# Patient Record
Sex: MALE | Race: BLACK OR AFRICAN AMERICAN | NOT HISPANIC OR LATINO
[De-identification: names, ages, dates, MRNs, and addresses within clinical notes are randomized per-mention and may not be internally consistent; named-entity substitution may affect disease eponyms.]

---

## 2018-11-12 ENCOUNTER — LABORATORY RESULT (OUTPATIENT)
Age: 68
End: 2018-11-12

## 2018-11-13 ENCOUNTER — APPOINTMENT (OUTPATIENT)
Dept: OTOLARYNGOLOGY | Facility: CLINIC | Age: 68
End: 2018-11-13
Payer: MEDICARE

## 2018-11-13 VITALS
HEART RATE: 65 BPM | BODY MASS INDEX: 28.05 KG/M2 | WEIGHT: 228 LBS | HEIGHT: 75.5 IN | DIASTOLIC BLOOD PRESSURE: 110 MMHG | SYSTOLIC BLOOD PRESSURE: 146 MMHG

## 2018-11-13 DIAGNOSIS — Z82.49 FAMILY HISTORY OF ISCHEMIC HEART DISEASE AND OTHER DISEASES OF THE CIRCULATORY SYSTEM: ICD-10-CM

## 2018-11-13 DIAGNOSIS — Z86.79 PERSONAL HISTORY OF OTHER DISEASES OF THE CIRCULATORY SYSTEM: ICD-10-CM

## 2018-11-13 DIAGNOSIS — Z83.3 FAMILY HISTORY OF DIABETES MELLITUS: ICD-10-CM

## 2018-11-13 DIAGNOSIS — Z78.9 OTHER SPECIFIED HEALTH STATUS: ICD-10-CM

## 2018-11-13 PROCEDURE — 99205 OFFICE O/P NEW HI 60 MIN: CPT

## 2018-11-13 PROCEDURE — 42800 BIOPSY OF THROAT: CPT

## 2018-11-16 ENCOUNTER — OUTPATIENT (OUTPATIENT)
Dept: OUTPATIENT SERVICES | Facility: HOSPITAL | Age: 68
LOS: 1 days | Discharge: HOME | End: 2018-11-16

## 2018-11-16 DIAGNOSIS — C09.0 MALIGNANT NEOPLASM OF TONSILLAR FOSSA: ICD-10-CM

## 2018-11-20 ENCOUNTER — RESULT REVIEW (OUTPATIENT)
Age: 68
End: 2018-11-20

## 2018-11-21 ENCOUNTER — APPOINTMENT (OUTPATIENT)
Dept: HEMATOLOGY ONCOLOGY | Facility: CLINIC | Age: 68
End: 2018-11-21
Payer: MEDICARE

## 2018-11-21 ENCOUNTER — INPATIENT (INPATIENT)
Facility: HOSPITAL | Age: 68
LOS: 0 days | Discharge: HOME | End: 2018-11-22
Attending: OTOLARYNGOLOGY | Admitting: OTOLARYNGOLOGY

## 2018-11-21 VITALS
DIASTOLIC BLOOD PRESSURE: 77 MMHG | HEIGHT: 72.91 IN | WEIGHT: 216.71 LBS | RESPIRATION RATE: 16 BRPM | TEMPERATURE: 98.3 F | OXYGEN SATURATION: 97 % | BODY MASS INDEX: 28.72 KG/M2 | SYSTOLIC BLOOD PRESSURE: 118 MMHG | HEART RATE: 80 BPM

## 2018-11-21 VITALS
RESPIRATION RATE: 20 BRPM | HEART RATE: 95 BPM | OXYGEN SATURATION: 99 % | SYSTOLIC BLOOD PRESSURE: 125 MMHG | DIASTOLIC BLOOD PRESSURE: 48 MMHG

## 2018-11-21 DIAGNOSIS — C09.9 MALIGNANT NEOPLASM OF TONSIL, UNSPECIFIED: ICD-10-CM

## 2018-11-21 DIAGNOSIS — F17.200 NICOTINE DEPENDENCE, UNSPECIFIED, UNCOMPLICATED: ICD-10-CM

## 2018-11-21 DIAGNOSIS — S52.90XA UNSPECIFIED FRACTURE OF UNSPECIFIED FOREARM, INITIAL ENCOUNTER FOR CLOSED FRACTURE: Chronic | ICD-10-CM

## 2018-11-21 DIAGNOSIS — R07.0 PAIN IN THROAT: ICD-10-CM

## 2018-11-21 LAB
ALBUMIN SERPL ELPH-MCNC: 4.2 G/DL — SIGNIFICANT CHANGE UP (ref 3.3–5)
ALP SERPL-CCNC: 117 U/L — SIGNIFICANT CHANGE UP (ref 40–120)
ALT FLD-CCNC: 9 U/L — SIGNIFICANT CHANGE UP (ref 4–41)
APTT BLD: 29.3 SEC — SIGNIFICANT CHANGE UP (ref 27.5–36.3)
AST SERPL-CCNC: 14 U/L — SIGNIFICANT CHANGE UP (ref 4–40)
BASE EXCESS BLDV CALC-SCNC: 3.6 MMOL/L — SIGNIFICANT CHANGE UP
BASOPHILS # BLD AUTO: 0.04 K/UL — SIGNIFICANT CHANGE UP (ref 0–0.2)
BASOPHILS NFR BLD AUTO: 0.5 % — SIGNIFICANT CHANGE UP (ref 0–2)
BILIRUB SERPL-MCNC: 0.5 MG/DL — SIGNIFICANT CHANGE UP (ref 0.2–1.2)
BLOOD GAS VENOUS - CREATININE: 0.85 MG/DL — SIGNIFICANT CHANGE UP (ref 0.5–1.3)
BUN SERPL-MCNC: 13 MG/DL — SIGNIFICANT CHANGE UP (ref 7–23)
CALCIUM SERPL-MCNC: 9.2 MG/DL — SIGNIFICANT CHANGE UP (ref 8.4–10.5)
CHLORIDE BLDV-SCNC: 102 MMOL/L — SIGNIFICANT CHANGE UP (ref 96–108)
CHLORIDE SERPL-SCNC: 100 MMOL/L — SIGNIFICANT CHANGE UP (ref 98–107)
CO2 SERPL-SCNC: 26 MMOL/L — SIGNIFICANT CHANGE UP (ref 22–31)
CREAT SERPL-MCNC: 0.99 MG/DL — SIGNIFICANT CHANGE UP (ref 0.5–1.3)
EOSINOPHIL # BLD AUTO: 0.17 K/UL — SIGNIFICANT CHANGE UP (ref 0–0.5)
EOSINOPHIL NFR BLD AUTO: 2 % — SIGNIFICANT CHANGE UP (ref 0–6)
GAS PNL BLDV: 137 MMOL/L — SIGNIFICANT CHANGE UP (ref 136–146)
GLUCOSE BLDV-MCNC: 100 — HIGH (ref 70–99)
GLUCOSE SERPL-MCNC: 104 MG/DL — HIGH (ref 70–99)
HCO3 BLDV-SCNC: 26 MMOL/L — SIGNIFICANT CHANGE UP (ref 20–27)
HCT VFR BLD CALC: 42.2 % — SIGNIFICANT CHANGE UP (ref 39–50)
HCT VFR BLDV CALC: 47 % — SIGNIFICANT CHANGE UP (ref 39–51)
HGB BLD-MCNC: 15.3 G/DL — SIGNIFICANT CHANGE UP (ref 13–17)
HGB BLDV-MCNC: 15.3 G/DL — SIGNIFICANT CHANGE UP (ref 13–17)
IMM GRANULOCYTES # BLD AUTO: 0.04 # — SIGNIFICANT CHANGE UP
IMM GRANULOCYTES NFR BLD AUTO: 0.5 % — SIGNIFICANT CHANGE UP (ref 0–1.5)
INR BLD: 1.09 — SIGNIFICANT CHANGE UP (ref 0.88–1.17)
LACTATE BLDV-MCNC: 1.7 MMOL/L — SIGNIFICANT CHANGE UP (ref 0.5–2)
LYMPHOCYTES # BLD AUTO: 2.45 K/UL — SIGNIFICANT CHANGE UP (ref 1–3.3)
LYMPHOCYTES # BLD AUTO: 28.2 % — SIGNIFICANT CHANGE UP (ref 13–44)
MCHC RBC-ENTMCNC: 34.5 PG — HIGH (ref 27–34)
MCHC RBC-ENTMCNC: 36.3 % — HIGH (ref 32–36)
MCV RBC AUTO: 95 FL — SIGNIFICANT CHANGE UP (ref 80–100)
MONOCYTES # BLD AUTO: 0.71 K/UL — SIGNIFICANT CHANGE UP (ref 0–0.9)
MONOCYTES NFR BLD AUTO: 8.2 % — SIGNIFICANT CHANGE UP (ref 2–14)
NEUTROPHILS # BLD AUTO: 5.28 K/UL — SIGNIFICANT CHANGE UP (ref 1.8–7.4)
NEUTROPHILS NFR BLD AUTO: 60.6 % — SIGNIFICANT CHANGE UP (ref 43–77)
NRBC # FLD: 0 — SIGNIFICANT CHANGE UP
PCO2 BLDV: 50 MMHG — SIGNIFICANT CHANGE UP (ref 41–51)
PH BLDV: 7.37 PH — SIGNIFICANT CHANGE UP (ref 7.32–7.43)
PLATELET # BLD AUTO: 211 K/UL — SIGNIFICANT CHANGE UP (ref 150–400)
PMV BLD: 10.4 FL — SIGNIFICANT CHANGE UP (ref 7–13)
PO2 BLDV: 43 MMHG — HIGH (ref 35–40)
POTASSIUM BLDV-SCNC: 4.2 MMOL/L — SIGNIFICANT CHANGE UP (ref 3.4–4.5)
POTASSIUM SERPL-MCNC: 3.5 MMOL/L — SIGNIFICANT CHANGE UP (ref 3.5–5.3)
POTASSIUM SERPL-SCNC: 3.5 MMOL/L — SIGNIFICANT CHANGE UP (ref 3.5–5.3)
PROT SERPL-MCNC: 8.1 G/DL — SIGNIFICANT CHANGE UP (ref 6–8.3)
PROTHROM AB SERPL-ACNC: 12.1 SEC — SIGNIFICANT CHANGE UP (ref 9.8–13.1)
RBC # BLD: 4.44 M/UL — SIGNIFICANT CHANGE UP (ref 4.2–5.8)
RBC # FLD: 12.4 % — SIGNIFICANT CHANGE UP (ref 10.3–14.5)
SAO2 % BLDV: 76.9 % — SIGNIFICANT CHANGE UP (ref 60–85)
SODIUM SERPL-SCNC: 138 MMOL/L — SIGNIFICANT CHANGE UP (ref 135–145)
WBC # BLD: 8.69 K/UL — SIGNIFICANT CHANGE UP (ref 3.8–10.5)
WBC # FLD AUTO: 8.69 K/UL — SIGNIFICANT CHANGE UP (ref 3.8–10.5)

## 2018-11-21 PROCEDURE — 99205 OFFICE O/P NEW HI 60 MIN: CPT | Mod: PD

## 2018-11-21 RX ORDER — ACETAMINOPHEN 500 MG
650 TABLET ORAL EVERY 6 HOURS
Qty: 0 | Refills: 0 | Status: DISCONTINUED | OUTPATIENT
Start: 2018-11-21 | End: 2018-11-22

## 2018-11-21 RX ORDER — HYDROCHLOROTHIAZIDE 25 MG
12.5 TABLET ORAL DAILY
Qty: 0 | Refills: 0 | Status: DISCONTINUED | OUTPATIENT
Start: 2018-11-21 | End: 2018-11-22

## 2018-11-21 RX ORDER — LOSARTAN POTASSIUM 100 MG/1
50 TABLET, FILM COATED ORAL DAILY
Qty: 0 | Refills: 0 | Status: DISCONTINUED | OUTPATIENT
Start: 2018-11-21 | End: 2018-11-22

## 2018-11-21 RX ADMIN — Medication 12.5 MILLIGRAM(S): at 23:45

## 2018-11-21 RX ADMIN — LOSARTAN POTASSIUM 50 MILLIGRAM(S): 100 TABLET, FILM COATED ORAL at 23:45

## 2018-11-21 NOTE — REASON FOR VISIT
[Initial Consultation] : an initial consultation [Family Member] : family member [FreeTextEntry2] : oropharyngeal cancer

## 2018-11-21 NOTE — ED ADULT NURSE NOTE - CHIEF COMPLAINT QUOTE
Pt brought in by EMS from [facility] for difficulty breathing, stridor. Pt noted to have protuberant tongue. 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

## 2018-11-21 NOTE — H&P ADULT - ASSESSMENT
A/P 68M with hx HTN now with likely stage IV tonsil cancer, still needs biopsy to confirm.  -can admit to ENT service   -no ENT intervention needed  -observe overnight on continuous pulse ox  -if no significant desats or breathing difficulty, patient can go home tomorrow morning  -patient needs to follow up outpatient with med onc/rad onc  -discussed with Dr. Billy  -please call/page with any questions or concerns

## 2018-11-21 NOTE — H&P ADULT - NSHPPHYSICALEXAM_GEN_ALL_CORE
NAD, awake and alert  Breathing comfortably on room air  Voice muffled  Face symmetric  Nose clear on right, mild nose bleeding on left  OC/OP: tongue very large and protruding anteriorly, unable to see posterior oropharynx  Neck: firm palpable mass on left neck, palpable cervical LAD on left

## 2018-11-21 NOTE — ED ADULT NURSE NOTE - OBJECTIVE STATEMENT
patient alert ox3 came in c/o swelling to tongue and lips and to mouth. patient is recently diagnosed as tonsillar cancer a week ago. current smoker . stridor noted. states he is able to swallow and requesting to eat food. family by bed side. seen by PA. labs done as ordered. awaiting results .

## 2018-11-21 NOTE — ED PROVIDER NOTE - PHYSICAL EXAMINATION
cyn-  GEN - NAD;  A+O x3   HEAD - NC/AT   EYES- PERRL, EOMI  ENT: mmm, +mass located in left posterior oropharynx, +tongue protrusion, +stridor, able to speak in full sentences, no drooling or pooling. no bleeding.  NECK: Neck supple, non-tender, no palpable masses.  PULMONARY - CTA b/l, symmetric breath sounds.   CARDIAC -s1s2, RRR, no M,G,R  ABDOMEN - +BS, ND, NT, soft, no guarding, no rebound, no masses   BACK - no CVA tenderness, Normal  spine   EXTREMITIES - FROM, symmetric pulses, capillary refill < 2 seconds, no edema   SKIN - no rash or bruising   NEUROLOGIC - alert, speech clear, no focal deficits  PSYCH -nl mood/affect, nl insight.

## 2018-11-21 NOTE — CONSULT LETTER
[Dear  ___] : Dear  [unfilled], [Consult Letter:] : I had the pleasure of evaluating your patient, [unfilled]. [Please see my note below.] : Please see my note below. [Consult Closing:] : Thank you very much for allowing me to participate in the care of this patient.  If you have any questions, please do not hesitate to contact me. [Sincerely,] : Sincerely, [FreeTextEntry2] : Dr. cheng Billy [FreeTextEntry3] : Cam Aldana MD\par \par

## 2018-11-21 NOTE — HISTORY OF PRESENT ILLNESS
[Disease: _____________________] : Disease: [unfilled] [de-identified] : Mr. Stallworth is a pleasant 69 y/o male with a h/o weight loss of the past few months. Over the past few weeks he has had a lack of appetite and dysphagia with voice changes. THis was associated with throat pain and difficulty breathing. Pt was treated with Clindamycin for a throat infection but it did not help. Pt saw Dr. Cervantes who ordered a CT neck that showed a tonsil mass. He saw Dr. Billy last week who did tonsillar bx in the office which was non-diagnostic. He has since had a PET/CT which showed previouslt noted large left sided neck mass extending from the left hilum of glossopharyngeal sulcus and left tonsil which is intensely hypermetabolic. This mass crosses midline and causes mass effect upon airway causing some deviation. It extends inferiorly to subglottic area . There is b/l hypermetabolic LAD with markedly enlarged level 2 nodes on left. There were also findings in the abdomen concerning for metastatic LN. The patient now has progressively protruberant tongue and prolonged periods of "sleep apnea". He c/o severe throat pain and left otalgia. He denies dysphagia, cough, or any other symptoms. \par  [de-identified] : sq cell ca

## 2018-11-21 NOTE — CONSULT NOTE ADULT - SUBJECTIVE AND OBJECTIVE BOX
68M with hx HTN sent to ED from clinic for concern about airway. Patient was recently seen in ENT clinic for left sided tonsil mass seen on CT neck in addition to multiple neck and mediastinal mets. Biopsy was done in clinic, which did not confirm the diagnosis of cancer. However, given high suspicion is currently being scheduled for a biopsy in the OR. Patient was sent for a PET/CT, which was done yesterday, which shows mets to abdomen possibly. Patient was seen in onc clinic today and he appeared very lethargic and desatted to 89% and was sent to ED for airway concern.     In ED patient is currently satting 100%. Says he feels fine, does not think he has any worse breathing than he has had for the last few weeks. He denies any difficulty eating/drinking. He has a muffled voice and some stertor, but no stridor.     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  -no ENT intervention needed  -observe overnight on continuous pulse ox  -if no significant desats or breathing difficulty, patient can go home tomorrow morning  -patient needs to follow up outpatient with med onc/rad onc  -discussed with Dr. Billy  -please call/page with any questions or concerns

## 2018-11-21 NOTE — H&P ADULT - HISTORY OF PRESENT ILLNESS
68M with hx HTN sent to ED from clinic for concern about airway. Patient was recently seen in ENT clinic for left sided tonsil mass seen on CT neck in addition to multiple neck and mediastinal mets. Biopsy was done in clinic, which did not confirm the diagnosis of cancer. However, given high suspicion is currently being scheduled for a biopsy in the OR. Patient was sent for a PET/CT, which was done yesterday, which shows mets to abdomen possibly. Patient was seen in onc clinic today and he appeared very lethargic and desatted to 89% and was sent to ED for airway concern.     In ED patient is currently satting 100%. Says he feels fine, does not think he has any worse breathing than he has had for the last few weeks. He denies any difficulty eating/drinking. He has a muffled voice and some stertor, but no stridor.

## 2018-11-21 NOTE — REVIEW OF SYSTEMS
[Recent Change In Weight] : ~T recent weight change [Hoarseness] : hoarseness [Shortness Of Breath] : shortness of breath [Negative] : Allergic/Immunologic [FreeTextEntry4] : as above

## 2018-11-21 NOTE — ED PROVIDER NOTE - MEDICAL DECISION MAKING DETAILS
69 y/o m with posterior pharyngeal mass, increasing sleepiness, stridor, currently speaking in full sentences, no drooling, ent consulted with concern for airway, also concern for hypercapnea given sleepiness in setting of obstruction, will check labs, ekg, f/u ent recs, careful monitoring, reass.

## 2018-11-21 NOTE — ED ADULT NURSE REASSESSMENT NOTE - NS ED NURSE REASSESS COMMENT FT1
in bed A and Ox  3 in NAD, pt reports " I am very hungry  if I don't eat something I will get very sick" communicates with full and complete sentences, has muffled speech, and grunting breathing denies distress, no distress noted pending ENT eval.

## 2018-11-21 NOTE — PHYSICAL EXAM
[Ambulatory and capable of all self care but unable to carry out any work activities] : Status 2- Ambulatory and capable of all self care but unable to carry out any work activities. Up and about more than 50% of waking hours [Normal] : affect appropriate [de-identified] : markedly protruberant tongue, left sided LAD

## 2018-11-21 NOTE — ED PROVIDER NOTE - OBJECTIVE STATEMENT
cyn-67 y/o m h/o large posterior pharyngeal mass undergoing malignancy w/u presenting with increasing sleepiness, apneic episodes. Patient had recent biopsy of mass, told it was likely CA but results were inconclusive. Patient notes for last few weeks has been very sleepy, falling asleep during regular activities like eating, talking. Has noticed increased stridor as well. No associated fevers, difficulty eating, difficulty breathing, cp, abd pain, swelling. Sent in for ent eval, follows with dr. vital. cyn-69 y/o m h/o HTN w/ large posterior pharyngeal mass undergoing malignancy w/u presenting with increasing sleepiness, apneic episodes. Patient had recent biopsy of oropharyngeal mass, told it was likely tonsillar CA but results were inconclusive. Patient notes for last few weeks has been very sleepy, falling asleep during regular activities like eating, talking. Has noticed increased stridor as well. No associated fevers, difficulty eating, difficulty breathing, cp, abd pain, swelling. Sent in for ent eval, follows with dr. vital.

## 2018-11-21 NOTE — ED PROVIDER NOTE - PROGRESS NOTE DETAILS
JOSE Hobbs: Pt seen by ENT, recommend CDU for obs to eval for desaturation. CDU did not accept pt. Accepted to ENT service, pt cleared to eat by ENT

## 2018-11-21 NOTE — ED ADULT TRIAGE NOTE - CHIEF COMPLAINT QUOTE
Pt brought in by EMS from [facility] for difficulty breathing, stridor. Pt noted to have protuberant tongue. 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

## 2018-11-22 ENCOUNTER — TRANSCRIPTION ENCOUNTER (OUTPATIENT)
Age: 68
End: 2018-11-22

## 2018-11-22 VITALS
TEMPERATURE: 97 F | HEART RATE: 64 BPM | SYSTOLIC BLOOD PRESSURE: 144 MMHG | RESPIRATION RATE: 18 BRPM | OXYGEN SATURATION: 99 % | DIASTOLIC BLOOD PRESSURE: 87 MMHG

## 2018-11-22 RX ORDER — INFLUENZA VIRUS VACCINE 15; 15; 15; 15 UG/.5ML; UG/.5ML; UG/.5ML; UG/.5ML
0.5 SUSPENSION INTRAMUSCULAR ONCE
Qty: 0 | Refills: 0 | Status: DISCONTINUED | OUTPATIENT
Start: 2018-11-22 | End: 2018-11-22

## 2018-11-22 RX ORDER — ACETAMINOPHEN 500 MG
2 TABLET ORAL
Qty: 0 | Refills: 0 | COMMUNITY
Start: 2018-11-22

## 2018-11-22 RX ORDER — LOSARTAN POTASSIUM 100 MG/1
1 TABLET, FILM COATED ORAL
Qty: 0 | Refills: 0 | COMMUNITY
Start: 2018-11-22

## 2018-11-22 RX ADMIN — Medication 650 MILLIGRAM(S): at 08:03

## 2018-11-22 RX ADMIN — Medication 650 MILLIGRAM(S): at 07:33

## 2018-11-22 RX ADMIN — LOSARTAN POTASSIUM 50 MILLIGRAM(S): 100 TABLET, FILM COATED ORAL at 06:18

## 2018-11-22 RX ADMIN — Medication 12.5 MILLIGRAM(S): at 06:18

## 2018-11-22 NOTE — DISCHARGE NOTE ADULT - PLAN OF CARE
workup and treatment for possible tonsil cancer -need to schedule biopsy with Dr. Billy  -follow up with [facility]/radonc

## 2018-11-22 NOTE — DISCHARGE NOTE ADULT - CARE PLAN
Principal Discharge DX:	Tonsillar cancer  Goal:	workup and treatment for possible tonsil cancer  Assessment and plan of treatment:	-need to schedule biopsy with Dr. Billy  [facility]

## 2018-11-22 NOTE — DISCHARGE NOTE ADULT - INSTRUCTIONS
regular diet call md if temperature is above 101.4  call if any difficulty swallowing or breathing .  follow up with  Dr. Billy

## 2018-11-22 NOTE — DISCHARGE NOTE ADULT - MEDICATION SUMMARY - MEDICATIONS TO TAKE
I will START or STAY ON the medications listed below when I get home from the hospital:    acetaminophen 325 mg oral tablet  -- 2 tab(s) by mouth every 6 hours, As needed, Mild Pain (1 - 3)  -- Indication: For pain    losartan 50 mg oral tablet  -- 1 tab(s) by mouth once a day  -- Indication: For HTN (hypertension)    hydroCHLOROthiazide 12.5 mg oral capsule  -- 1 cap(s) by mouth once a day  -- Indication: For HTN (hypertension)

## 2018-11-22 NOTE — DISCHARGE NOTE ADULT - PATIENT PORTAL LINK FT
You can access the [facility] Patient Portal, offered by [facility], by registering with the following website: [email or web address]

## 2018-11-22 NOTE — DISCHARGE NOTE ADULT - CARE PROVIDER_API CALL
Marc Billy), Otolaryngology  [address] [place] [id number]  Phone: [phone]  Fax: [phone]

## 2018-11-22 NOTE — DISCHARGE NOTE ADULT - HOSPITAL COURSE
Pt was admitted for overnight observation on continuous pulse ox. No desats or respiratory difficulty overnight or during admission. Discharged with outpt follow up on HD2

## 2018-11-26 ENCOUNTER — INBOUND DOCUMENT (OUTPATIENT)
Age: 68
End: 2018-11-26

## 2018-11-27 ENCOUNTER — APPOINTMENT (OUTPATIENT)
Dept: RADIATION ONCOLOGY | Facility: CLINIC | Age: 68
End: 2018-11-27
Payer: MEDICARE

## 2018-11-27 ENCOUNTER — OUTPATIENT (OUTPATIENT)
Dept: OUTPATIENT SERVICES | Facility: HOSPITAL | Age: 68
LOS: 1 days | Discharge: HOME | End: 2018-11-27
Payer: MEDICARE

## 2018-11-27 VITALS
HEART RATE: 74 BPM | BODY MASS INDEX: 29.82 KG/M2 | DIASTOLIC BLOOD PRESSURE: 83 MMHG | OXYGEN SATURATION: 95 % | SYSTOLIC BLOOD PRESSURE: 147 MMHG | RESPIRATION RATE: 16 BRPM | HEIGHT: 72 IN | WEIGHT: 220.13 LBS

## 2018-11-27 DIAGNOSIS — S52.90XA UNSPECIFIED FRACTURE OF UNSPECIFIED FOREARM, INITIAL ENCOUNTER FOR CLOSED FRACTURE: Chronic | ICD-10-CM

## 2018-11-27 PROBLEM — I10 ESSENTIAL (PRIMARY) HYPERTENSION: Chronic | Status: ACTIVE | Noted: 2018-11-21

## 2018-11-27 PROCEDURE — 99204 OFFICE O/P NEW MOD 45 MIN: CPT | Mod: 25

## 2018-11-27 PROCEDURE — 77263 THER RADIOLOGY TX PLNG CPLX: CPT

## 2018-11-27 NOTE — VITALS
[Maximal Pain Intensity: 10/10] : 10/10 [Least Pain Intensity: 9/10] : 9/10 [Pain Description/Quality: ___] : Pain description/quality: [unfilled] [Pain Duration: ___] : Pain duration: [unfilled] [Pain Location: ___] : Pain Location: [unfilled] [Pain Interferes with ADLs] : Pain interferes with activities of daily living. [68: Cares for self; unalbe to carry on normal activity or do active work.] : 68: Cares for self; unable to carry on normal activity or do active work. [ECOG Performance Status: 2 - Ambulatory and capable of all self care but unable to carry out any work activities] : Performance Status: 2 - Ambulatory and capable of all self care but unable to carry out any work activities. Up and about more than 50% of waking hours

## 2018-11-29 ENCOUNTER — OUTPATIENT (OUTPATIENT)
Dept: OUTPATIENT SERVICES | Facility: HOSPITAL | Age: 68
LOS: 1 days | End: 2018-11-29
Payer: MEDICARE

## 2018-11-29 VITALS
TEMPERATURE: 98 F | HEIGHT: 72.25 IN | OXYGEN SATURATION: 98 % | HEART RATE: 81 BPM | WEIGHT: 214.95 LBS | SYSTOLIC BLOOD PRESSURE: 130 MMHG | RESPIRATION RATE: 14 BRPM | DIASTOLIC BLOOD PRESSURE: 78 MMHG

## 2018-11-29 DIAGNOSIS — R22.0 LOCALIZED SWELLING, MASS AND LUMP, HEAD: ICD-10-CM

## 2018-11-29 DIAGNOSIS — Z98.890 OTHER SPECIFIED POSTPROCEDURAL STATES: Chronic | ICD-10-CM

## 2018-11-29 DIAGNOSIS — S52.90XA UNSPECIFIED FRACTURE OF UNSPECIFIED FOREARM, INITIAL ENCOUNTER FOR CLOSED FRACTURE: Chronic | ICD-10-CM

## 2018-11-29 DIAGNOSIS — G47.30 SLEEP APNEA, UNSPECIFIED: ICD-10-CM

## 2018-11-29 LAB
ALBUMIN SERPL ELPH-MCNC: 4.1 G/DL — SIGNIFICANT CHANGE UP (ref 3.3–5)
ALP SERPL-CCNC: 114 U/L — SIGNIFICANT CHANGE UP (ref 40–120)
ALT FLD-CCNC: 8 U/L — SIGNIFICANT CHANGE UP (ref 4–41)
AST SERPL-CCNC: 11 U/L — SIGNIFICANT CHANGE UP (ref 4–40)
BILIRUB SERPL-MCNC: 0.4 MG/DL — SIGNIFICANT CHANGE UP (ref 0.2–1.2)
BUN SERPL-MCNC: 18 MG/DL — SIGNIFICANT CHANGE UP (ref 7–23)
CALCIUM SERPL-MCNC: 9.2 MG/DL — SIGNIFICANT CHANGE UP (ref 8.4–10.5)
CHLORIDE SERPL-SCNC: 100 MMOL/L — SIGNIFICANT CHANGE UP (ref 98–107)
CO2 SERPL-SCNC: 28 MMOL/L — SIGNIFICANT CHANGE UP (ref 22–31)
CREAT SERPL-MCNC: 1.16 MG/DL — SIGNIFICANT CHANGE UP (ref 0.5–1.3)
GLUCOSE SERPL-MCNC: 133 MG/DL — HIGH (ref 70–99)
HBA1C BLD-MCNC: 5.9 % — HIGH (ref 4–5.6)
HCT VFR BLD CALC: 41.3 % — SIGNIFICANT CHANGE UP (ref 39–50)
HGB BLD-MCNC: 14.6 G/DL — SIGNIFICANT CHANGE UP (ref 13–17)
MCHC RBC-ENTMCNC: 34 PG — SIGNIFICANT CHANGE UP (ref 27–34)
MCHC RBC-ENTMCNC: 35.4 % — SIGNIFICANT CHANGE UP (ref 32–36)
MCV RBC AUTO: 96 FL — SIGNIFICANT CHANGE UP (ref 80–100)
NRBC # FLD: 0 — SIGNIFICANT CHANGE UP
PLATELET # BLD AUTO: 186 K/UL — SIGNIFICANT CHANGE UP (ref 150–400)
PMV BLD: 11.6 FL — SIGNIFICANT CHANGE UP (ref 7–13)
POTASSIUM SERPL-MCNC: 3 MMOL/L — LOW (ref 3.5–5.3)
POTASSIUM SERPL-SCNC: 3 MMOL/L — LOW (ref 3.5–5.3)
PROT SERPL-MCNC: 7.9 G/DL — SIGNIFICANT CHANGE UP (ref 6–8.3)
RBC # BLD: 4.3 M/UL — SIGNIFICANT CHANGE UP (ref 4.2–5.8)
RBC # FLD: 12.3 % — SIGNIFICANT CHANGE UP (ref 10.3–14.5)
SODIUM SERPL-SCNC: 142 MMOL/L — SIGNIFICANT CHANGE UP (ref 135–145)
WBC # BLD: 6.78 K/UL — SIGNIFICANT CHANGE UP (ref 3.8–10.5)
WBC # FLD AUTO: 6.78 K/UL — SIGNIFICANT CHANGE UP (ref 3.8–10.5)

## 2018-11-29 PROCEDURE — 93010 ELECTROCARDIOGRAM REPORT: CPT

## 2018-11-29 NOTE — H&P PST ADULT - HISTORY OF PRESENT ILLNESS
68M with hx HTN and recently seen in "ENT clinic for left sided tonsil mass seen on CT neck in addition to multiple neck and mediastinal mets". Patient and cousin report that Patient has had recent weight loss, voice changes, throat swelling and throat pain. Patient is s/p CT Neck, PET scan MRI with abnormal results. Patient denies dysphagia and recently diagnosed with Sleep Apnea not on any interventions. Patient was referred to Dr. Villela for an evaluation. Pre op diagnosis: localized swelling, mass and lump, head. Patient is scheduled for direct laryngoscopy with biopsy and esophagoscopy scheduled on 12/5/2018. 68M with hx HTN and recently seen in "ENT clinic for left sided tonsil mass seen on CT neck in addition to multiple neck and mediastinal mets". Patient and cousin report that Patient has had recent weight loss, voice changes, throat swelling and throat pain. Patient is s/p CT Neck, PET scan MRI with abnormal results. Patient denies dysphagia and recently diagnosed with Sleep Apnea not on any interventions. Patient was referred to Dr. Villela for an evaluation. Pre op diagnosis: localized swelling, mass and lump, head. Patient is scheduled for direct laryngoscopy with biopsy and esophagoscopy scheduled on 12/5/2018.       Patient is a poor historian.

## 2018-11-29 NOTE — H&P PST ADULT - PROBLEM SELECTOR PLAN 1
Patient is scheduled for direct laryngoscopy with biopsy and esophagoscopy scheduled on 12/5/2018.     Preop instructions, pepcid,  provided. Pt and family stated understanding.    Pending medical evaluation per surgeon and PST ( Patient is a poor historian)Dr. Pittman -[phone]-[phone].     Patient instructed to take antihypertensive medications per routine. Patient is scheduled for direct laryngoscopy with biopsy and esophagoscopy scheduled on 12/5/2018.     Preop instructions, pepcid,  provided. Pt and family stated understanding.    Pending medical evaluation per surgeon and PST ( Patient is a poor historian) and Pending comparison EKG -Dr. Pittman -[phone]-[phone].     Patient instructed to take antihypertensive medications per routine.      Asprin Plan - Last dose 11/28/2018    Discussed case with Dr. Mendiola, In agreement with plan.

## 2018-11-29 NOTE — H&P PST ADULT - ENMT COMMENTS
left ear pain, Throat swelling, left side of the neck swelling, voice change Pre op diagnosis: Localized swelling, mass and lump, head

## 2018-11-29 NOTE — H&P PST ADULT - NEGATIVE MUSCULOSKELETAL SYMPTOMS
no leg pain R/no arthritis/no muscle cramps/no muscle weakness/no neck pain/no arm pain L/no arm pain R/no back pain/no leg pain L

## 2018-11-29 NOTE — H&P PST ADULT - EKG AND INTERPRETATION
EKG in chart EKG in chart- Reviewed EKG with Dr. Mendiola - Pending Comparison EKG, Patient denies chest pain, palpitations, SOB.

## 2018-11-29 NOTE — H&P PST ADULT - NEGATIVE ENMT SYMPTOMS
no recurrent cold sores/no tinnitus/no nasal discharge/no gum bleeding/no hearing difficulty/no abnormal taste sensation/no vertigo/no sinus symptoms

## 2018-11-29 NOTE — H&P PST ADULT - NEUROLOGICAL DETAILS
responds to verbal commands/responds to pain/normal strength/alert and oriented x 3/sensation intact

## 2018-11-29 NOTE — H&P PST ADULT - ASSESSMENT
Pre op diagnosis: localized swelling, mass and lump, head. Patient is scheduled for direct laryngoscopy with biopsy and esophagoscopy scheduled on 12/5/2018.

## 2018-11-29 NOTE — H&P PST ADULT - NEGATIVE OPHTHALMOLOGIC SYMPTOMS
no irritation L/no discharge R/no pain L/no irritation R/no photophobia/no blurred vision R/no diplopia/no blurred vision L/no discharge L/no pain R

## 2018-11-29 NOTE — H&P PST ADULT - PAIN, CHRONIC: FACTORS THAT AGGRAVATE, PROFILE
19    Patient: Ken Kerr  : 1974 Visit date: 2019    Dear  Dr. Gayla Cisse MD,    Thank you for referring Ken Kerr to my practice. Please find my assessment and plan below.       Imp: Apparent healed scrotal abscess no evidence of re
19    Patient: Sara Ontiveros  : 1974 Visit date: 2019    Dear  Dr. Valerie Hidalgo MD,    Thank you for referring Sara Ontiveros to my practice. Please find my assessment and plan below.       Imp: Apparent healed scrotal abscess no evidence
movement

## 2018-11-29 NOTE — H&P PST ADULT - PMH
Asthma  Denies recent hospitalizations for asthma  DM (diabetes mellitus)  Not on any medications  HTN (hypertension)    Localized swelling, mass and lump, head    Tonsil cancer Asthma  Denies recent hospitalizations for asthma  DM (diabetes mellitus)  Not on any medications  HTN (hypertension)    Localized swelling, mass and lump, head    Sleep apnea    Tonsil cancer

## 2018-11-29 NOTE — H&P PST ADULT - PSH
Forearm fracture    History of surgery  Right knee surgery Forearm fracture    History of surgery  Right knee replacement - surgery

## 2018-11-29 NOTE — H&P PST ADULT - NEGATIVE NEUROLOGICAL SYMPTOMS
no loss of consciousness/no transient paralysis/no focal seizures/no loss of sensation/no difficulty walking/no vertigo/no syncope/no weakness/no paresthesias/no generalized seizures/no facial palsy/no hemiparesis/no tremors/no headache/no confusion

## 2018-11-29 NOTE — H&P PST ADULT - CONSTITUTIONAL COMMENTS
Patient frequently dosing off through out the PST evaluation, and easily arousable However, Alert and Oriented x 3 when awake

## 2018-11-29 NOTE — H&P PST ADULT - NEGATIVE GENERAL GENITOURINARY SYMPTOMS
no bladder infections/no dysuria/no urinary hesitancy/no flank pain R/no incontinence/no nocturia/no hematuria/normal urinary frequency/no flank pain L/no urine discoloration

## 2018-11-30 ENCOUNTER — OTHER (OUTPATIENT)
Age: 68
End: 2018-11-30

## 2018-11-30 ENCOUNTER — EMERGENCY (EMERGENCY)
Facility: HOSPITAL | Age: 68
LOS: 1 days | Discharge: HOME | End: 2018-11-30
Attending: EMERGENCY MEDICINE | Admitting: EMERGENCY MEDICINE
Payer: MEDICARE

## 2018-11-30 VITALS
HEART RATE: 70 BPM | SYSTOLIC BLOOD PRESSURE: 184 MMHG | OXYGEN SATURATION: 96 % | DIASTOLIC BLOOD PRESSURE: 92 MMHG | RESPIRATION RATE: 18 BRPM

## 2018-11-30 VITALS
OXYGEN SATURATION: 97 % | SYSTOLIC BLOOD PRESSURE: 178 MMHG | RESPIRATION RATE: 14 BRPM | HEART RATE: 96 BPM | DIASTOLIC BLOOD PRESSURE: 97 MMHG

## 2018-11-30 VITALS — HEART RATE: 88 BPM | OXYGEN SATURATION: 95 %

## 2018-11-30 DIAGNOSIS — S52.90XA UNSPECIFIED FRACTURE OF UNSPECIFIED FOREARM, INITIAL ENCOUNTER FOR CLOSED FRACTURE: Chronic | ICD-10-CM

## 2018-11-30 DIAGNOSIS — Z98.890 OTHER SPECIFIED POSTPROCEDURAL STATES: Chronic | ICD-10-CM

## 2018-11-30 PROBLEM — E11.9 TYPE 2 DIABETES MELLITUS WITHOUT COMPLICATIONS: Chronic | Status: ACTIVE | Noted: 2018-11-21

## 2018-11-30 LAB
ALBUMIN SERPL ELPH-MCNC: 4.2 G/DL — SIGNIFICANT CHANGE UP (ref 3.3–5)
ALP SERPL-CCNC: 118 U/L — SIGNIFICANT CHANGE UP (ref 40–120)
ALT FLD-CCNC: 8 U/L — SIGNIFICANT CHANGE UP (ref 4–41)
AST SERPL-CCNC: 14 U/L — SIGNIFICANT CHANGE UP (ref 4–40)
BASE EXCESS BLDV CALC-SCNC: 6.8 MMOL/L — SIGNIFICANT CHANGE UP
BASOPHILS # BLD AUTO: 0.03 K/UL — SIGNIFICANT CHANGE UP (ref 0–0.2)
BASOPHILS NFR BLD AUTO: 0.4 % — SIGNIFICANT CHANGE UP (ref 0–2)
BILIRUB SERPL-MCNC: 0.7 MG/DL — SIGNIFICANT CHANGE UP (ref 0.2–1.2)
BLOOD GAS VENOUS - CREATININE: 0.95 MG/DL — SIGNIFICANT CHANGE UP (ref 0.5–1.3)
BUN SERPL-MCNC: 17 MG/DL — SIGNIFICANT CHANGE UP (ref 7–23)
CALCIUM SERPL-MCNC: 9.6 MG/DL — SIGNIFICANT CHANGE UP (ref 8.4–10.5)
CHLORIDE BLDV-SCNC: 101 MMOL/L — SIGNIFICANT CHANGE UP (ref 96–108)
CHLORIDE SERPL-SCNC: 98 MMOL/L — SIGNIFICANT CHANGE UP (ref 98–107)
CO2 SERPL-SCNC: 25 MMOL/L — SIGNIFICANT CHANGE UP (ref 22–31)
CREAT SERPL-MCNC: 0.97 MG/DL — SIGNIFICANT CHANGE UP (ref 0.5–1.3)
EOSINOPHIL # BLD AUTO: 0.13 K/UL — SIGNIFICANT CHANGE UP (ref 0–0.5)
EOSINOPHIL NFR BLD AUTO: 1.9 % — SIGNIFICANT CHANGE UP (ref 0–6)
GAS PNL BLDV: 141 MMOL/L — SIGNIFICANT CHANGE UP (ref 136–146)
GLUCOSE BLDV-MCNC: 129 — HIGH (ref 70–99)
GLUCOSE SERPL-MCNC: 122 MG/DL — HIGH (ref 70–99)
HCO3 BLDV-SCNC: 28 MMOL/L — HIGH (ref 20–27)
HCT VFR BLD CALC: 45.1 % — SIGNIFICANT CHANGE UP (ref 39–50)
HCT VFR BLDV CALC: 49.9 % — SIGNIFICANT CHANGE UP (ref 39–51)
HGB BLD-MCNC: 16.2 G/DL — SIGNIFICANT CHANGE UP (ref 13–17)
HGB BLDV-MCNC: 16.3 G/DL — SIGNIFICANT CHANGE UP (ref 13–17)
IMM GRANULOCYTES # BLD AUTO: 0.03 # — SIGNIFICANT CHANGE UP
IMM GRANULOCYTES NFR BLD AUTO: 0.4 % — SIGNIFICANT CHANGE UP (ref 0–1.5)
LACTATE BLDV-MCNC: 1.4 MMOL/L — SIGNIFICANT CHANGE UP (ref 0.5–2)
LYMPHOCYTES # BLD AUTO: 1.73 K/UL — SIGNIFICANT CHANGE UP (ref 1–3.3)
LYMPHOCYTES # BLD AUTO: 25.7 % — SIGNIFICANT CHANGE UP (ref 13–44)
MCHC RBC-ENTMCNC: 33.9 PG — SIGNIFICANT CHANGE UP (ref 27–34)
MCHC RBC-ENTMCNC: 35.9 % — SIGNIFICANT CHANGE UP (ref 32–36)
MCV RBC AUTO: 94.4 FL — SIGNIFICANT CHANGE UP (ref 80–100)
MONOCYTES # BLD AUTO: 0.67 K/UL — SIGNIFICANT CHANGE UP (ref 0–0.9)
MONOCYTES NFR BLD AUTO: 9.9 % — SIGNIFICANT CHANGE UP (ref 2–14)
NEUTROPHILS # BLD AUTO: 4.15 K/UL — SIGNIFICANT CHANGE UP (ref 1.8–7.4)
NEUTROPHILS NFR BLD AUTO: 61.7 % — SIGNIFICANT CHANGE UP (ref 43–77)
NRBC # FLD: 0 — SIGNIFICANT CHANGE UP
PCO2 BLDV: 54 MMHG — HIGH (ref 41–51)
PH BLDV: 7.39 PH — SIGNIFICANT CHANGE UP (ref 7.32–7.43)
PLATELET # BLD AUTO: 200 K/UL — SIGNIFICANT CHANGE UP (ref 150–400)
PMV BLD: 10.8 FL — SIGNIFICANT CHANGE UP (ref 7–13)
PO2 BLDV: 38 MMHG — SIGNIFICANT CHANGE UP (ref 35–40)
POTASSIUM BLDV-SCNC: 3.1 MMOL/L — LOW (ref 3.4–4.5)
POTASSIUM SERPL-MCNC: 3.2 MMOL/L — LOW (ref 3.5–5.3)
POTASSIUM SERPL-SCNC: 3.2 MMOL/L — LOW (ref 3.5–5.3)
PROT SERPL-MCNC: 8.2 G/DL — SIGNIFICANT CHANGE UP (ref 6–8.3)
RBC # BLD: 4.78 M/UL — SIGNIFICANT CHANGE UP (ref 4.2–5.8)
RBC # FLD: 12.2 % — SIGNIFICANT CHANGE UP (ref 10.3–14.5)
SAO2 % BLDV: 68 % — SIGNIFICANT CHANGE UP (ref 60–85)
SODIUM SERPL-SCNC: 138 MMOL/L — SIGNIFICANT CHANGE UP (ref 135–145)
WBC # BLD: 6.74 K/UL — SIGNIFICANT CHANGE UP (ref 3.8–10.5)
WBC # FLD AUTO: 6.74 K/UL — SIGNIFICANT CHANGE UP (ref 3.8–10.5)

## 2018-11-30 PROCEDURE — 99285 EMERGENCY DEPT VISIT HI MDM: CPT

## 2018-11-30 PROCEDURE — 71045 X-RAY EXAM CHEST 1 VIEW: CPT | Mod: 26

## 2018-11-30 RX ORDER — POTASSIUM CHLORIDE 20 MEQ
10 PACKET (EA) ORAL ONCE
Qty: 0 | Refills: 0 | Status: DISCONTINUED | OUTPATIENT
Start: 2018-11-30 | End: 2018-11-30

## 2018-11-30 NOTE — CONSULT NOTE ADULT - SUBJECTIVE AND OBJECTIVE BOX
HPI  68M with hx HTN sent to ED from clinic for concern about airway. Patient was recently seen in ENT clinic for left sided tonsil mass seen on CT neck in addition to multiple neck and mediastinal mets. Biopsy was done in clinic, which did not confirm the diagnosis of cancer. However, given high suspicion is currently being scheduled for a biopsy in the OR 12/5. PET/CT shows mets to abdomen possibly. Patient was seen in rad onc clinic today and he appeared very lethargic SOB and was sent to ED for airway concern. pt states he worked a job on a  last pm (he is a ) and exerted himself. currently reports that his breathing is at baseline.     In ED patient is currently satting 100%. denies any difficulty eating/drinking. voice remains as muffled as it has been. placed on bipap in ed. bipap was removed at bedside during exam. of note, no desats.     Exam  NAD, awake and alert  Breathing comfortably on room air  Voice muffled  Face symmetric  NC clear  OC/OP: tongue very large and protruding anteriorly, left tonsil mass seen with rightward deviation of the uvula, no bleeding  Neck: left firm palpable cervical LAD     Labs reviewed    A/P 68M with hx HTN now with likely stage IV tonsil cancer, stable resp status  -no acute ENT intervention needed  -OR bx 12/5  -po as wilfredo  -refrain from heavy physical activity  -outpt med onc and rad onc following  -discussed with Dr. Billy HPI  68M with hx HTN sent to ED from clinic for concern about airway. Patient was recently seen in ENT clinic for left sided tonsil mass seen on CT neck in addition to multiple neck and mediastinal mets. Biopsy was done in clinic, which did not confirm the diagnosis of cancer. However, given high suspicion is currently being scheduled for a biopsy in the OR 12/5. PET/CT shows mets to abdomen possibly. Patient was seen in rad onc clinic today and he appeared very lethargic SOB and was sent to ED for airway concern. pt states he worked a job on a  last pm (he is a ) and exerted himself. currently reports that his breathing is at baseline.     In ED patient is currently satting 100%. denies any difficulty eating/drinking. voice remains as muffled as it has been. placed on bipap in ed. bipap was removed at bedside during exam. of note, no desats.     Exam  NAD, awake and alert  Breathing comfortably on room air  Voice muffled  Face symmetric  NC clear  OC/OP: tongue very large and protruding anteriorly, left tonsil mass seen with rightward deviation of the uvula, no bleeding  Neck: left firm palpable cervical LAD     FOE: left tonsil mass abutting the posterior OP wall, epiglottis/ae fols/arytenoids sharp, bl tvc mobile, airway patent, no pooling secretions or edema    Labs reviewed    A/P 68M with hx HTN now with likely stage IV tonsil cancer, stable resp status  -no acute ENT intervention needed  -OR bx 12/5  -po as wilfredo  -refrain from heavy physical activity  -outpt med onc and rad onc following  -discussed with Dr. Billy

## 2018-11-30 NOTE — ED ADULT NURSE NOTE - CHIEF COMPLAINT QUOTE
recently dx with throat CA d/t swelling to tongue and throat presents from [facility] with worsening throat swelling. pts tongue is swollen protruding from mouth, no stridor however snorring noises made. pt alert and maintaining own airway at this time saturation 99% ion NC. overeall increase WOB

## 2018-11-30 NOTE — ED PROVIDER NOTE - MEDICAL DECISION MAKING DETAILS
68 Y M with malignancy of the upper oropharynx who presents with increasing swelling of the tongue and difficulty breathing. Will start pt on nasal bipap, low concern for acute decompensation given chronicity however will plan to intubate with bipap if pt shows signs of resp distress now and with good saturation, will get ENT to evaluate. Sherif att: 68 Y M with malignancy of the upper oropharynx who presents with increasing swelling of the tongue and difficulty breathing. Will start pt on nasal bipap, low concern for acute decompensation given chronicity however will plan to intubate with bipap if pt shows signs of resp distress now and with good saturation, will get ENT to evaluate.

## 2018-11-30 NOTE — ED ADULT NURSE NOTE - OBJECTIVE STATEMENT
pt brought as note to rm 17, A&ox3, skin w/d/i, amb @ baseline, recently dx with throat CA and swelling to tongue brought from [facility] w/worsening throat swelling recently, on presentation tongue protruding from mouth, family states protrudes @ baseline however more than normal today,  no resp distress noted, able to communicate, stridor noted, VS as noted, family denies family denies chemo/radiation, was being fitted for radiation mask today when symptoms began, SL placed, labs sent, report to Maninder TELLES.  (fac)

## 2018-11-30 NOTE — ED ADULT NURSE NOTE - PMH
Asthma  Denies recent hospitalizations for asthma  DM (diabetes mellitus)  Not on any medications  HTN (hypertension)    Localized swelling, mass and lump, head    Sleep apnea    Tonsil cancer

## 2018-11-30 NOTE — ED ADULT NURSE REASSESSMENT NOTE - NS ED NURSE REASSESS COMMENT FT1
pt given a glass of water able to swallow with no difficulty, pt d/c by MD Morales, iv removed pt ambulatory

## 2018-11-30 NOTE — ED ADULT NURSE REASSESSMENT NOTE - NS ED NURSE REASSESS COMMENT FT1
Received report from KOKI Dickens. Pt A&Ox3. Pt has protruding tongue. On CPAP 10/5, FiO2 40, sating 100%. Respirations equal, nonlabored, no sign of respiratory distress. Normal sinus on the monitor.

## 2018-11-30 NOTE — ED PROVIDER NOTE - PROGRESS NOTE DETAILS
Resident: Felipe Morales - Spoke with ENT, they said that they saw the patient here last week and he is still baseline, they scoped bedside feels pt can be DCed. Spoke with pt and family they feel comfortable allowing him to go home. Plan is to admit patient on wed after debulking with ENT. polina: s/o from Dr Gorman. pt stable.  feels fine.  no progression of tongue swelling.  no resp distress.  sat 96%.  pt without retractions.  close return precautions given.

## 2018-11-30 NOTE — ED ADULT TRIAGE NOTE - CHIEF COMPLAINT QUOTE
recently dx with throat CA d/t swelling to tongue and throat presents from [facility] with worsening throat swelling. pts tongue is swollen protruding from mouth, no stridor however snorring noises made. pt alert and maintaining own airway at this time saturation 99% ion NC. overeall increase WOB

## 2018-11-30 NOTE — ED PROVIDER NOTE - OBJECTIVE STATEMENT
67 y/o m h/o HTN w/ large posterior pharyngeal mass presents stating that he is having increasing trouble breathing 2/2 mass. 69 y/o m h/o HTN w/ large posterior pharyngeal mass presents stating that he is having increasing trouble breathing 2/2 mass. Pt was admitted here Nov 21st for same however family states that it has now progressed. Pt is difficult to understand due to extent of tongue swelling. Family at bedside says he normally has some trouble breathing however today is much worse they say. Pt admits to feeling like it is difficult to breath, he denies LOC.

## 2018-12-01 ENCOUNTER — OUTPATIENT (OUTPATIENT)
Dept: OUTPATIENT SERVICES | Facility: HOSPITAL | Age: 68
LOS: 1 days | End: 2018-12-01
Payer: MEDICARE

## 2018-12-01 DIAGNOSIS — Z98.890 OTHER SPECIFIED POSTPROCEDURAL STATES: Chronic | ICD-10-CM

## 2018-12-01 DIAGNOSIS — Z76.89 PERSONS ENCOUNTERING HEALTH SERVICES IN OTHER SPECIFIED CIRCUMSTANCES: ICD-10-CM

## 2018-12-01 DIAGNOSIS — S52.90XA UNSPECIFIED FRACTURE OF UNSPECIFIED FOREARM, INITIAL ENCOUNTER FOR CLOSED FRACTURE: Chronic | ICD-10-CM

## 2018-12-02 NOTE — DISEASE MANAGEMENT
[Clinical] : TNM Stage: c [N/A] : Currently not applicable [FreeTextEntry4] : repeat  biopsy requested [TTNM] : 0 [NTNM] : 0 [MTNM] : 0

## 2018-12-02 NOTE — HISTORY OF PRESENT ILLNESS
[FreeTextEntry1] : Mr. Placido Stallworth is a 67 y/o gentleman\par \par He noticed dysphagia, weight loss, and voice changes that lead him to get treatment for a throat infection.  A CT neck 10/11/18 shows a 5.7 x 3.7 x 3cm lesion on the left side of the tongue, glossopharyngeal sulcus, and left palatine tonsil, engulfing the elongated left stylohyoid ligament.  There is a lack of left vallecula.  Additional bilateral lymph nodes were noted, though the contralateral nodes were felt to be indeterminant.  He met with Dr. Billy 11/13/18 who biopsied this lesion with pathology showing only inflammation.  \par \par A PET/CT was performed 11/12/18 shows that the left neck mass extends from left hilum(?), glossopharyngeal sulcus, and left tonsil.  It crosses the midline and has mass effect on the airway.  This extends inferiorly to the subglottic region of the left lateral pharyngeal wall, above the true vocal cords.  There is bilateral hypermetabolic lymphadenopathy with markedly enlarged level II nodes, small suspect nodes at level III and IV on the left.  Soft tissue nodule in superior mediastinum is without abnormal uptake; there is moderate uptake at level IV LN seen further inferiorly without a clear inciting process in the lung fields (may be reactive) but malignant involvement not excluded.  There is  borderline dilated thoracic aorta 4.0cm.\par \par Met with Dr. Aldana on 11/21/18.  He was experiencing stridor with a protuberant tongue and was sent to the ER.  He was observed overnight with no desats or respiratory difficulty and was discharged. At that time he was seen the ENT service and no acute intervention was undertaken.\par \par Another biopsy with ENT  was scheduled 12/5/2018. \par \par Side effects from radiation to head and neck reviewed with Pt and family, which include but not limited to skin reaction, difficulty breathing, neck pain, dysphagia, SOB, tissue necrosis.

## 2018-12-02 NOTE — REVIEW OF SYSTEMS
[Negative] : Allergic/Immunologic [FreeTextEntry4] : difficulty breathing, h/o admission for that 1 week ago.

## 2018-12-02 NOTE — PHYSICAL EXAM
[Normal] : oriented to person, place and time, the affect was normal, the mood was normal and not anxious [de-identified] : Patient has a protruding tongue, stridorus, left neck palpable mass [de-identified] : As above

## 2018-12-03 PROBLEM — J45.909 UNSPECIFIED ASTHMA, UNCOMPLICATED: Chronic | Status: ACTIVE | Noted: 2018-11-29

## 2018-12-03 PROBLEM — G47.30 SLEEP APNEA, UNSPECIFIED: Chronic | Status: ACTIVE | Noted: 2018-11-29

## 2018-12-03 PROBLEM — R22.0 LOCALIZED SWELLING, MASS AND LUMP, HEAD: Chronic | Status: ACTIVE | Noted: 2018-11-29

## 2018-12-04 ENCOUNTER — INPATIENT (INPATIENT)
Facility: HOSPITAL | Age: 68
LOS: 26 days | Discharge: HOME HEALTH | End: 2018-12-31
Attending: HOSPITALIST | Admitting: HOSPITALIST
Payer: MEDICARE

## 2018-12-04 ENCOUNTER — RESULT REVIEW (OUTPATIENT)
Age: 68
End: 2018-12-04

## 2018-12-04 VITALS
SYSTOLIC BLOOD PRESSURE: 167 MMHG | OXYGEN SATURATION: 100 % | DIASTOLIC BLOOD PRESSURE: 117 MMHG | HEART RATE: 113 BPM | RESPIRATION RATE: 21 BRPM

## 2018-12-04 DIAGNOSIS — S52.90XA UNSPECIFIED FRACTURE OF UNSPECIFIED FOREARM, INITIAL ENCOUNTER FOR CLOSED FRACTURE: Chronic | ICD-10-CM

## 2018-12-04 DIAGNOSIS — Z98.890 OTHER SPECIFIED POSTPROCEDURAL STATES: Chronic | ICD-10-CM

## 2018-12-04 DIAGNOSIS — J98.8 OTHER SPECIFIED RESPIRATORY DISORDERS: ICD-10-CM

## 2018-12-04 LAB
ALBUMIN SERPL ELPH-MCNC: 4.9 G/DL — SIGNIFICANT CHANGE UP (ref 3.3–5)
ALP SERPL-CCNC: 138 U/L — HIGH (ref 40–120)
ALT FLD-CCNC: 13 U/L — SIGNIFICANT CHANGE UP (ref 4–41)
APTT BLD: 31.7 SEC — SIGNIFICANT CHANGE UP (ref 27.5–36.3)
AST SERPL-CCNC: 24 U/L — SIGNIFICANT CHANGE UP (ref 4–40)
BASE EXCESS BLDV CALC-SCNC: 2.5 MMOL/L — SIGNIFICANT CHANGE UP
BASE EXCESS BLDV CALC-SCNC: 5.6 MMOL/L — SIGNIFICANT CHANGE UP
BASOPHILS # BLD AUTO: 0.03 K/UL — SIGNIFICANT CHANGE UP (ref 0–0.2)
BASOPHILS NFR BLD AUTO: 0.4 % — SIGNIFICANT CHANGE UP (ref 0–2)
BILIRUB SERPL-MCNC: 0.8 MG/DL — SIGNIFICANT CHANGE UP (ref 0.2–1.2)
BLOOD GAS VENOUS - CREATININE: 1.12 MG/DL — SIGNIFICANT CHANGE UP (ref 0.5–1.3)
BLOOD GAS VENOUS - CREATININE: 1.17 MG/DL — SIGNIFICANT CHANGE UP (ref 0.5–1.3)
BUN SERPL-MCNC: 25 MG/DL — HIGH (ref 7–23)
CALCIUM SERPL-MCNC: 10.2 MG/DL — SIGNIFICANT CHANGE UP (ref 8.4–10.5)
CHLORIDE BLDV-SCNC: 101 MMOL/L — SIGNIFICANT CHANGE UP (ref 96–108)
CHLORIDE BLDV-SCNC: 99 MMOL/L — SIGNIFICANT CHANGE UP (ref 96–108)
CHLORIDE SERPL-SCNC: 97 MMOL/L — LOW (ref 98–107)
CO2 SERPL-SCNC: 30 MMOL/L — SIGNIFICANT CHANGE UP (ref 22–31)
CREAT SERPL-MCNC: 1.22 MG/DL — SIGNIFICANT CHANGE UP (ref 0.5–1.3)
EOSINOPHIL # BLD AUTO: 0.04 K/UL — SIGNIFICANT CHANGE UP (ref 0–0.5)
EOSINOPHIL NFR BLD AUTO: 0.5 % — SIGNIFICANT CHANGE UP (ref 0–6)
GAS PNL BLDV: 140 MMOL/L — SIGNIFICANT CHANGE UP (ref 136–146)
GAS PNL BLDV: 142 MMOL/L — SIGNIFICANT CHANGE UP (ref 136–146)
GLUCOSE BLDC GLUCOMTR-MCNC: 128 MG/DL — HIGH (ref 70–99)
GLUCOSE BLDV-MCNC: 140 — HIGH (ref 70–99)
GLUCOSE BLDV-MCNC: 154 — HIGH (ref 70–99)
GLUCOSE SERPL-MCNC: 129 MG/DL — HIGH (ref 70–99)
HCO3 BLDV-SCNC: 25 MMOL/L — SIGNIFICANT CHANGE UP (ref 20–27)
HCO3 BLDV-SCNC: 26 MMOL/L — SIGNIFICANT CHANGE UP (ref 20–27)
HCT VFR BLD CALC: 46.2 % — SIGNIFICANT CHANGE UP (ref 39–50)
HCT VFR BLDV CALC: 48.3 % — SIGNIFICANT CHANGE UP (ref 39–51)
HCT VFR BLDV CALC: 50.8 % — SIGNIFICANT CHANGE UP (ref 39–51)
HGB BLD-MCNC: 16.4 G/DL — SIGNIFICANT CHANGE UP (ref 13–17)
HGB BLDV-MCNC: 15.8 G/DL — SIGNIFICANT CHANGE UP (ref 13–17)
HGB BLDV-MCNC: 16.6 G/DL — SIGNIFICANT CHANGE UP (ref 13–17)
IMM GRANULOCYTES # BLD AUTO: 0.02 # — SIGNIFICANT CHANGE UP
IMM GRANULOCYTES NFR BLD AUTO: 0.2 % — SIGNIFICANT CHANGE UP (ref 0–1.5)
INR BLD: 1.1 — SIGNIFICANT CHANGE UP (ref 0.88–1.17)
LACTATE BLDV-MCNC: 1.9 MMOL/L — SIGNIFICANT CHANGE UP (ref 0.5–2)
LACTATE BLDV-MCNC: 2.2 MMOL/L — HIGH (ref 0.5–2)
LYMPHOCYTES # BLD AUTO: 1.26 K/UL — SIGNIFICANT CHANGE UP (ref 1–3.3)
LYMPHOCYTES # BLD AUTO: 14.7 % — SIGNIFICANT CHANGE UP (ref 13–44)
MCHC RBC-ENTMCNC: 34.3 PG — HIGH (ref 27–34)
MCHC RBC-ENTMCNC: 35.5 % — SIGNIFICANT CHANGE UP (ref 32–36)
MCV RBC AUTO: 96.7 FL — SIGNIFICANT CHANGE UP (ref 80–100)
MONOCYTES # BLD AUTO: 0.69 K/UL — SIGNIFICANT CHANGE UP (ref 0–0.9)
MONOCYTES NFR BLD AUTO: 8.1 % — SIGNIFICANT CHANGE UP (ref 2–14)
NEUTROPHILS # BLD AUTO: 6.51 K/UL — SIGNIFICANT CHANGE UP (ref 1.8–7.4)
NEUTROPHILS NFR BLD AUTO: 76.1 % — SIGNIFICANT CHANGE UP (ref 43–77)
NRBC # FLD: 0 — SIGNIFICANT CHANGE UP
PCO2 BLDV: 55 MMHG — HIGH (ref 41–51)
PCO2 BLDV: 66 MMHG — HIGH (ref 41–51)
PH BLDV: 7.3 PH — LOW (ref 7.32–7.43)
PH BLDV: 7.33 PH — SIGNIFICANT CHANGE UP (ref 7.32–7.43)
PLATELET # BLD AUTO: 202 K/UL — SIGNIFICANT CHANGE UP (ref 150–400)
PMV BLD: 11.5 FL — SIGNIFICANT CHANGE UP (ref 7–13)
PO2 BLDV: 31 MMHG — LOW (ref 35–40)
PO2 BLDV: 61 MMHG — HIGH (ref 35–40)
POTASSIUM BLDV-SCNC: 3.8 MMOL/L — SIGNIFICANT CHANGE UP (ref 3.4–4.5)
POTASSIUM BLDV-SCNC: 4.3 MMOL/L — SIGNIFICANT CHANGE UP (ref 3.4–4.5)
POTASSIUM SERPL-MCNC: 3.3 MMOL/L — LOW (ref 3.5–5.3)
POTASSIUM SERPL-SCNC: 3.3 MMOL/L — LOW (ref 3.5–5.3)
PROT SERPL-MCNC: 9.3 G/DL — HIGH (ref 6–8.3)
PROTHROM AB SERPL-ACNC: 12.2 SEC — SIGNIFICANT CHANGE UP (ref 9.8–13.1)
RBC # BLD: 4.78 M/UL — SIGNIFICANT CHANGE UP (ref 4.2–5.8)
RBC # FLD: 12 % — SIGNIFICANT CHANGE UP (ref 10.3–14.5)
SAO2 % BLDV: 48.9 % — LOW (ref 60–85)
SAO2 % BLDV: 88.5 % — HIGH (ref 60–85)
SODIUM SERPL-SCNC: 143 MMOL/L — SIGNIFICANT CHANGE UP (ref 135–145)
WBC # BLD: 8.55 K/UL — SIGNIFICANT CHANGE UP (ref 3.8–10.5)
WBC # FLD AUTO: 8.55 K/UL — SIGNIFICANT CHANGE UP (ref 3.8–10.5)

## 2018-12-04 PROCEDURE — 31535 LARYNGOSCOPY W/BIOPSY: CPT | Mod: GC

## 2018-12-04 PROCEDURE — 88304 TISSUE EXAM BY PATHOLOGIST: CPT | Mod: 26

## 2018-12-04 PROCEDURE — 71045 X-RAY EXAM CHEST 1 VIEW: CPT | Mod: 26

## 2018-12-04 PROCEDURE — 88305 TISSUE EXAM BY PATHOLOGIST: CPT | Mod: 26

## 2018-12-04 PROCEDURE — 99222 1ST HOSP IP/OBS MODERATE 55: CPT | Mod: GC,25

## 2018-12-04 PROCEDURE — 31603 EMER TRACHEOSTOMY TTRACH: CPT | Mod: GC

## 2018-12-04 RX ORDER — EPINEPHRINE 0.3 MG/.3ML
0.3 INJECTION INTRAMUSCULAR; SUBCUTANEOUS ONCE
Qty: 0 | Refills: 0 | Status: COMPLETED | OUTPATIENT
Start: 2018-12-04 | End: 2018-12-04

## 2018-12-04 RX ORDER — HEPARIN SODIUM 5000 [USP'U]/ML
5000 INJECTION INTRAVENOUS; SUBCUTANEOUS EVERY 8 HOURS
Qty: 0 | Refills: 0 | Status: DISCONTINUED | OUTPATIENT
Start: 2018-12-04 | End: 2018-12-11

## 2018-12-04 RX ORDER — EPINEPHRINE 11.25MG/ML
3 SOLUTION, NON-ORAL INHALATION ONCE
Qty: 0 | Refills: 0 | Status: COMPLETED | OUTPATIENT
Start: 2018-12-04 | End: 2018-12-04

## 2018-12-04 RX ORDER — INSULIN LISPRO 100/ML
VIAL (ML) SUBCUTANEOUS
Qty: 0 | Refills: 0 | Status: DISCONTINUED | OUTPATIENT
Start: 2018-12-04 | End: 2018-12-08

## 2018-12-04 RX ORDER — DEXTROSE 50 % IN WATER 50 %
25 SYRINGE (ML) INTRAVENOUS ONCE
Qty: 0 | Refills: 0 | Status: DISCONTINUED | OUTPATIENT
Start: 2018-12-04 | End: 2018-12-31

## 2018-12-04 RX ORDER — ACETAMINOPHEN 500 MG
1000 TABLET ORAL ONCE
Qty: 0 | Refills: 0 | Status: COMPLETED | OUTPATIENT
Start: 2018-12-04 | End: 2018-12-05

## 2018-12-04 RX ORDER — DEXAMETHASONE 0.5 MG/5ML
10 ELIXIR ORAL ONCE
Qty: 0 | Refills: 0 | Status: COMPLETED | OUTPATIENT
Start: 2018-12-04 | End: 2018-12-04

## 2018-12-04 RX ORDER — SODIUM CHLORIDE 9 MG/ML
1000 INJECTION, SOLUTION INTRAVENOUS
Qty: 0 | Refills: 0 | Status: DISCONTINUED | OUTPATIENT
Start: 2018-12-04 | End: 2018-12-05

## 2018-12-04 RX ORDER — MORPHINE SULFATE 50 MG/1
4 CAPSULE, EXTENDED RELEASE ORAL EVERY 6 HOURS
Qty: 0 | Refills: 0 | Status: DISCONTINUED | OUTPATIENT
Start: 2018-12-04 | End: 2018-12-05

## 2018-12-04 RX ORDER — GLUCAGON INJECTION, SOLUTION 0.5 MG/.1ML
1 INJECTION, SOLUTION SUBCUTANEOUS ONCE
Qty: 0 | Refills: 0 | Status: DISCONTINUED | OUTPATIENT
Start: 2018-12-04 | End: 2018-12-31

## 2018-12-04 RX ORDER — DEXTROSE 50 % IN WATER 50 %
15 SYRINGE (ML) INTRAVENOUS ONCE
Qty: 0 | Refills: 0 | Status: DISCONTINUED | OUTPATIENT
Start: 2018-12-04 | End: 2018-12-31

## 2018-12-04 RX ORDER — SODIUM CHLORIDE 9 MG/ML
1000 INJECTION, SOLUTION INTRAVENOUS
Qty: 0 | Refills: 0 | Status: DISCONTINUED | OUTPATIENT
Start: 2018-12-04 | End: 2018-12-31

## 2018-12-04 RX ORDER — MORPHINE SULFATE 50 MG/1
2 CAPSULE, EXTENDED RELEASE ORAL EVERY 6 HOURS
Qty: 0 | Refills: 0 | Status: DISCONTINUED | OUTPATIENT
Start: 2018-12-04 | End: 2018-12-05

## 2018-12-04 RX ORDER — DEXTROSE 50 % IN WATER 50 %
12.5 SYRINGE (ML) INTRAVENOUS ONCE
Qty: 0 | Refills: 0 | Status: DISCONTINUED | OUTPATIENT
Start: 2018-12-04 | End: 2018-12-31

## 2018-12-04 RX ORDER — ONDANSETRON 8 MG/1
4 TABLET, FILM COATED ORAL ONCE
Qty: 0 | Refills: 0 | Status: DISCONTINUED | OUTPATIENT
Start: 2018-12-04 | End: 2018-12-11

## 2018-12-04 RX ORDER — ROBINUL 0.2 MG/ML
2 INJECTION INTRAMUSCULAR; INTRAVENOUS ONCE
Qty: 0 | Refills: 0 | Status: DISCONTINUED | OUTPATIENT
Start: 2018-12-04 | End: 2018-12-04

## 2018-12-04 RX ORDER — ROBINUL 0.2 MG/ML
0.2 INJECTION INTRAMUSCULAR; INTRAVENOUS ONCE
Qty: 0 | Refills: 0 | Status: COMPLETED | OUTPATIENT
Start: 2018-12-04 | End: 2018-12-04

## 2018-12-04 RX ORDER — MIDAZOLAM HYDROCHLORIDE 1 MG/ML
2 INJECTION, SOLUTION INTRAMUSCULAR; INTRAVENOUS ONCE
Qty: 0 | Refills: 0 | Status: DISCONTINUED | OUTPATIENT
Start: 2018-12-04 | End: 2018-12-04

## 2018-12-04 RX ORDER — HYDROMORPHONE HYDROCHLORIDE 2 MG/ML
0.5 INJECTION INTRAMUSCULAR; INTRAVENOUS; SUBCUTANEOUS
Qty: 0 | Refills: 0 | Status: DISCONTINUED | OUTPATIENT
Start: 2018-12-04 | End: 2018-12-05

## 2018-12-04 RX ADMIN — Medication 3 MILLILITER(S): at 17:20

## 2018-12-04 RX ADMIN — ROBINUL 0.2 MILLIGRAM(S): 0.2 INJECTION INTRAMUSCULAR; INTRAVENOUS at 18:04

## 2018-12-04 RX ADMIN — HEPARIN SODIUM 5000 UNIT(S): 5000 INJECTION INTRAVENOUS; SUBCUTANEOUS at 23:48

## 2018-12-04 RX ADMIN — SODIUM CHLORIDE 100 MILLILITER(S): 9 INJECTION, SOLUTION INTRAVENOUS at 23:48

## 2018-12-04 RX ADMIN — MIDAZOLAM HYDROCHLORIDE 2 MILLIGRAM(S): 1 INJECTION, SOLUTION INTRAMUSCULAR; INTRAVENOUS at 17:44

## 2018-12-04 RX ADMIN — Medication 102 MILLIGRAM(S): at 17:25

## 2018-12-04 RX ADMIN — EPINEPHRINE 0.3 MILLIGRAM(S): 0.3 INJECTION INTRAMUSCULAR; SUBCUTANEOUS at 17:18

## 2018-12-04 RX ADMIN — Medication 10 MILLIGRAM(S): at 17:55

## 2018-12-04 NOTE — H&P ADULT - HISTORY OF PRESENT ILLNESS
68M w/ h/o asthma, htn, DM (diet controlled) and recently diagnosed left sided tonsillar mass s/p non-diagnostic tonsil biopsy in the office 11/13/18 who presented to the ED with SOB. At that time flexible laryngoscopy showed patent airway but inability to tolerate secretions with pooling of secretions in the hypopharynx and suspected aspiration. Awake nasal fiberoptic intubation was attempted by the anesthesia team in the ED however without success and the patient was then brought urgently to the OR for fiberoptic intubation, tracheotomy, direct laryngoscopy, biopsy. Patient did not desat lower than 89% and remained HD stable throughout. Patient is full code. Patient is already followed by MO and RO as an outpatient and was originally scheduled for a DLB, esophagoscopy tomorrow.

## 2018-12-04 NOTE — ED PROVIDER NOTE - PROGRESS NOTE DETAILS
MD Le:  Patient has very poor oropharyngeal anatomy as well as external anatomy.  Securing his airway should occur in a controlled setting.  Patient is saturating 98%, albeit in some distress.  His level of distress improved s/p epi, steroids and glycopyrrolate.  Nasotracheal airway attempted x 1 by anaesthesia, unsuccessfully.  Next attempt should occur in OR with full surgical backup.  OR cannot book case w/o ENT attending of record, Dr. Billy, who is on his way to hospital.  Unacceptable for patient with a critcal airway to wait in ED for ENT surgical attending to arrive, when there his high probability of a surgical airway.  Patient should either go straight to OR or to SICU, not wait in the ED for surgical attending to arrive.  Admit to SICU, Dr. Boykin, under ENT attending, Dr. Billy

## 2018-12-04 NOTE — ED PROVIDER NOTE - CRITICAL CARE PROVIDED
additional history taking/interpretation of diagnostic studies/consultation with other physicians/consult w/ pt's family directly relating to pts condition/direct patient care (not related to procedure)/conducted a detailed discussion of DNR status/documentation

## 2018-12-04 NOTE — ED PROVIDER NOTE - NEUROLOGICAL, MLM
Alert and oriented, no focal deficits, no motor or sensory deficits.  agitated but following commands

## 2018-12-04 NOTE — ED ADULT NURSE NOTE - OBJECTIVE STATEMENT
Pt arrived to trauma room C presenting with obstructed airway. As per pt family, pt has been this way since Thanksgiving, was scheduled for a biopsy tomorrow but "could not breathe" today. Tongue is edematous and protruding from mouth. Pt making audible grunting noises, being suctioned as needed for excessive secretions. Pt able to express to providers the need to urinate, and is responsive to verbal stimuli. Pt O2 sat 98% on non rebreather mask. Labs drawn and sent. MDs at bedside for evaluation. Will continue to monitor.

## 2018-12-04 NOTE — ED PROVIDER NOTE - NOTES
OR cannot book case w/o ENT attending of record, Dr. Billy, who is on his way to hospital.  Unacceptable for patient in this condition to wait in ED for surgical attending to arrive, when there his high probability of a surgical airway.  Patient will either go straight to OR or to SICU.

## 2018-12-04 NOTE — H&P ADULT - NSHPLABSRESULTS_GEN_ALL_CORE
16.4   8.55  )-----------( 202      ( 04 Dec 2018 17:28 )             46.2     12-04    143  |  97<L>  |  25<H>  ----------------------------<  129<H>  3.3<L>   |  30  |  1.22    Ca    10.2      04 Dec 2018 17:28    TPro  9.3<H>  /  Alb  4.9  /  TBili  0.8  /  DBili  x   /  AST  24  /  ALT  13  /  AlkPhos  138<H>  12-04    Prior imaging  CT neck 10/11/18: 5.7 x 3.7 x 3cm lesion on the left side of the tongue, glossopharyngeal sulcus, and left palatine tonsil, engulfing the elongated left stylohyoid ligament. There is a lack of left vallecula. Additional bilateral lymph nodes were noted, though the contralateral nodes were felt to be indeterminant.     PET/CT 11/12/18: the left neck mass extends from left hilum(?), glossopharyngeal sulcus, and left tonsil. It crosses the midline and has mass effect on the airway. This extends inferiorly to the subglottic region of the left lateral pharyngeal wall, above the true vocal cords. There is bilateral hypermetabolic lymphadenopathy with markedly enlarged level II nodes, small suspect nodes at level III and IV on the left. Soft tissue nodule in superior mediastinum is without abnormal uptake; there is moderate uptake at level IV LN seen further inferiorly without a clear inciting process in the lung fields (may be reactive) but malignant involvement not excluded. There is borderline dilated thoracic aorta 4.0cm.

## 2018-12-04 NOTE — ED CLERICAL - CLERICAL COMMENTS
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

## 2018-12-04 NOTE — H&P ADULT - ASSESSMENT
68M w/ L tonsillar mass who presented to the ED with SOB with inability to tolerate his secretions s/p DLB, trach doing well post-op.  -SICU vs PACU overnight, anticipate transfer to floor w/ cont pulse ox tomorrow  -CXR to confirm NGT position  -NPO/IVF, will start meds via NGT once position is confirmed but will keep patient NPO for possible Gtube tomorrow  -pain control prn  -home meds via NGT onc eposition is confirmed   -will need SLP evaluation prior to any PO intake  -routine trach care, trach teaching tomorrow  -DM control -FSG, ISS  -will follow-up with his MO/RO team upon discharge  -f/u path  -scds, sqh  -PT prior to discharge

## 2018-12-04 NOTE — ED PROVIDER NOTE - ATTENDING CONTRIBUTION TO CARE
MD Le:  patient seen and evaluated with the resident.  I was present for key portions of the History & Physical, and I agree with the Impression & Plan.  MD Le:  67 yo M, c/o acute respiratory distress.  Onset:  1wk.  Context:  known airway mass; been scoped by ENT 2x in last 10d; plan was Bx and debulking.  However, patient's respiratory status has deteriorated to the point where he has been unable to tolerate his secretions, and his tongue has become more protrusive.  Associated Sx: ++drooling.  No pain.  Intensity:  10/10.  Location: mouth.  VS:  tachy, protuberant tongue, drooling, +tachypnea, severe respiratory distress.  bilateral wheezing.    no peripheral edema.  no rash, no bleeding from mouth.  Patient opens eyes and appears alert, but cannot phonate due to large mass.  Impression:  airway compromise from tonsillar mass with acute respiratory distress in a patient with underlying intrinsic lung disease.  Plan:  surgical airway most appropriate in this context (anaesthesia attempted awake nasal airway w/o success, and ENT performed direct fiber-optic laryngoscopy and they agree that orotracheal airway is NOT appropriate).

## 2018-12-04 NOTE — H&P ADULT - NSHPPHYSICALEXAM_GEN_ALL_CORE
Constitutional: NAD, alert, awake, trying to communicate via writing  Ear: external ears are normal bilaterally  Nasal: NGT in R nare sutured in place  Oral Cavity: tongue protruding out of oral cavity  Head/Face: no masses or lesions seen, face symmetric  Eyes: EOMI  Neck: 6LPC in place secured with trach tie and sutures x2, scant peristomal ooze, suction passes easily; 5.8 cm conglomerate of left level II/III neck nodes  Respiratory: breathing comfortably on humidified TC  Neurological: cranial nerves 2-12 intact  Skin: no rashes, scars on arm and leg

## 2018-12-04 NOTE — ED ADULT TRIAGE NOTE - CHIEF COMPLAINT QUOTE
Wife states pt has head and neck cancer and is scheduled for biopsy tomorrow but pt can't breathe.  Tongue edematous, unable to speak with airway compromise.  Pt with grunting sounds.  CN notified and pt direct to [facility]

## 2018-12-04 NOTE — ED PROVIDER NOTE - OBJECTIVE STATEMENT
Onset:  1wk.  Context:  known airway mass; been scoped by ENT 2x in last 10d; plan was Bx and debulking.  However, patient's respiratory status has deteriorated to the point where he has been unable to tolerate his secretions, and his tongue has become more protrusive.  Associated Sx: ++drooling.  No pain.  Intensity:  10/10.  Location: mouth.

## 2018-12-04 NOTE — CONSULT NOTE ADULT - SUBJECTIVE AND OBJECTIVE BOX
SICU Consultation Note  =====================================================  HPI: 68y male  ***    Surgery Information  ***  Case Duration: 	EBL: 	IV Fluids: 	Blood Products: 	Urine Output:   Complications:     HISTORY  68y Male  HPI:    Allergies:   PAST MEDICAL & SURGICAL HISTORY:  Sleep apnea  Asthma: Denies recent hospitalizations for asthma  Localized swelling, mass and lump, head  Tonsil cancer  DM (diabetes mellitus): Not on any medications  HTN (hypertension)  History of surgery: Right knee replacement - surgery  Forearm fracture    FAMILY HISTORY:  No pertinent family history in first degree relatives      SOCIAL HISTORY:  ***    ADVANCE DIRECTIVES: Presumed Full Code  ***    REVIEW OF SYSTEMS:  ***  General: Non-Contributory  Skin/Breast: Non-Contributory  Ophthalmologic: Non-Contributory  ENMT: Non-Contributory  Respiratory and Thorax: Non-Contributory  Cardiovascular: Non-Contributory  Gastrointestinal: Non-Contributory  Genitourinary: Non-Contributory  Musculoskeletal: Non-Contributory  Neurological: Non-Contributory  Psychiatric: Non-Contributory  Hematology/Lymphatics: Non-Contributory  Endocrine: Non-Contributory  Allergic/Immunologic: Non-Contributory    HOME MEDICATIONS:  ***    CURRENT MEDICATIONS:   --------------------------------------------------------------------------------------  Neurologic Medications  acetaminophen  IVPB .. 1000 milliGRAM(s) IV Intermittent once PRN Mild Pain (1 - 3)  morphine  - Injectable 2 milliGRAM(s) IV Push every 6 hours PRN Moderate Pain (4 - 6)  morphine  - Injectable 4 milliGRAM(s) IV Push every 6 hours PRN Severe Pain (7 - 10)    Respiratory Medications    Cardiovascular Medications    Gastrointestinal Medications  lactated ringers. 1000 milliLiter(s) IV Continuous <Continuous>    Genitourinary Medications    Hematologic/Oncologic Medications  heparin  Injectable 5000 Unit(s) SubCutaneous every 8 hours    Antimicrobial/Immunologic Medications    Endocrine/Metabolic Medications    Topical/Other Medications    --------------------------------------------------------------------------------------    VITAL SIGNS, INS/OUTS (last 24 hours):  --------------------------------------------------------------------------------------  T(C): 36.6 (12-04-18 @ 21:15), Max: 36.6 (12-04-18 @ 21:15)  HR: 76 (12-04-18 @ 21:30) (76 - 115)  BP: 145/78 (12-04-18 @ 21:30) (140/73 - 184/79)  BP(mean): 88 (12-04-18 @ 21:30) (88 - 120)  ABP: --  ABP(mean): --  RR: 19 (12-04-18 @ 21:30) (18 - 23)  SpO2: 97% (12-04-18 @ 21:30) (96% - 100%)  Wt(kg): --  CVP(mm Hg): --  CI: --  CAPILLARY BLOOD GLUCOSE       N/A  --------------------------------------------------------------------------------------    EXAM  NEUROLOGY  Exam: Normal, NAD, alert, oriented x 3, no focal deficits.    HEENT  Exam: Normocephalic, atraumatic.  EOMI ***    RESPIRATORY  Exam: coarse breath sounds bilaterally, normal expansion/effort.   Mechanical Ventilation:     CARDIOVASCULAR  Exam: S1, S2.  Regular rate and rhythm    GI/NUTRITION  Exam: Abdomen soft, Non-tender, Non-distended.   Current Diet:  NPO    VASCULAR  Exam: Extremities warm, pink, well-perfused.  ***    MUSCULOSKELETAL  Exam: All extremities moving spontaneously without limitations.  ***    SKIN:  Exam: Good skin turgor, no skin breakdown.  ***    METABOLIC/FLUIDS/ELECTROLYTES  lactated ringers. 1000 milliLiter(s) IV Continuous <Continuous>      HEMATOLOGIC  [x] DVT Prophylaxis: heparin  Injectable 5000 Unit(s) SubCutaneous every 8 hours    Transfusions:	[] PRBC	[] Platelets		[] FFP	[] Cryoprecipitate    INFECTIOUS DISEASE  Antimicrobials/Immunologic Medications:    Day #  	of    ***    Tubes/Lines/Drains  ***  [x] Peripheral IV  [] Central Venous Line     	[] R	[] L	[] IJ	[] Fem	[] SC	Date Placed:   [] Arterial Line		[] R	[] L	[] Fem	[] Rad	[] Ax	Date Placed:   [] PICC:         	[] Midline		[] Mediport  [] Urinary Catheter		Date Placed:     LABS  --------------------------------------------------------------------------------------  CBC (12-04 @ 17:28)                              16.4                           8.55    )----------------(  202        76.1  % Neutrophils, 14.7  % Lymphocytes, ANC: 6.51                                46.2                  BMP (12-04 @ 17:28)             143     |  97<L>   |  25<H> 		Ca++ --      Ca 10.2               ---------------------------------( 129<H>		Mg --                 3.3<L>  |  30      |  1.22  			Ph --        LFTs (12-04 @ 17:28)      TPro 9.3<H> / Alb 4.9 / TBili 0.8 / DBili -- / AST 24 / ALT 13 / AlkPhos 138<H>    Coags (12-04 @ 17:28)  aPTT 31.7 / INR 1.10 / PT 12.2    ABG (12-04 @ 18:11)      /  /  /  /  / %     Lactate:   2.2<H>  ABG (12-04 @ 17:28)      /  /  /  /  / %     Lactate:   1.9    VBG (12-04 @ 18:11)     7.33 / 55<H> / 61<H> / 25 / 2.5 / 88.5<H>%  VBG (12-04 @ 17:28)     7.30<L> / 66<H> / 31<L> / 26 / 5.6 / 48.9<L>%      --------------------------------------------------------------------------------------    OTHER LABS    IMAGING RESULTS  Echo:   CT:   Xray:     ASSESSMENT:  68y Male     PLAN:  ***  Neurologic:   Respiratory:   Cardiovascular:   Gastrointestinal/Nutrition:   Renal/Genitourinary:   Hematologic:   Infectious Disease:   Tubes/Lines/Drains:   Endocrine:   Disposition:     --------------------------------------------------------------------------------------    Critical Care Diagnoses: SICU Consultation Note  =====================================================  HPI: 68M w/ h/o asthma, HTN, DM (diet controlled) and recently diagnosed left sided tonsillar mass s/p non-diagnostic tonsil biopsy in the office 11/13/18 who presented to the ED with SOB. At that time flexible laryngoscopy showed patent airway but inability to tolerate secretions with pooling of secretions in the hypopharynx and suspected aspiration. Awake nasal fiberoptic intubation was attempted by the anesthesia team in the ED however without success and the patient was then brought urgently to the OR for fiberoptic intubation, tracheotomy, direct laryngoscopy, biopsy. Patient did not desat lower than 89% and remained HD stable throughout. Patient is full code. Patient is already followed by MO and RO as an outpatient and was originally scheduled for a DLB, esophagoscopy tomorrow.     Surgery Information  ***  Case Duration: 	EBL: 	IV Fluids: 	Blood Products: 	Urine Output:   Complications:     HISTORY  68y Male  HPI:    Allergies:   PAST MEDICAL & SURGICAL HISTORY:  Sleep apnea  Asthma: Denies recent hospitalizations for asthma  Localized swelling, mass and lump, head  Tonsil cancer  DM (diabetes mellitus): Not on any medications  HTN (hypertension)  History of surgery: Right knee replacement - surgery  Forearm fracture    FAMILY HISTORY:  No pertinent family history in first degree relatives      SOCIAL HISTORY:  ***    ADVANCE DIRECTIVES: Presumed Full Code  ***    REVIEW OF SYSTEMS:  ***  General: Non-Contributory  Skin/Breast: Non-Contributory  Ophthalmologic: Non-Contributory  ENMT: Non-Contributory  Respiratory and Thorax: Non-Contributory  Cardiovascular: Non-Contributory  Gastrointestinal: Non-Contributory  Genitourinary: Non-Contributory  Musculoskeletal: Non-Contributory  Neurological: Non-Contributory  Psychiatric: Non-Contributory  Hematology/Lymphatics: Non-Contributory  Endocrine: Non-Contributory  Allergic/Immunologic: Non-Contributory    HOME MEDICATIONS:  ***    CURRENT MEDICATIONS:   --------------------------------------------------------------------------------------  Neurologic Medications  acetaminophen  IVPB .. 1000 milliGRAM(s) IV Intermittent once PRN Mild Pain (1 - 3)  morphine  - Injectable 2 milliGRAM(s) IV Push every 6 hours PRN Moderate Pain (4 - 6)  morphine  - Injectable 4 milliGRAM(s) IV Push every 6 hours PRN Severe Pain (7 - 10)    Respiratory Medications    Cardiovascular Medications    Gastrointestinal Medications  lactated ringers. 1000 milliLiter(s) IV Continuous <Continuous>    Genitourinary Medications    Hematologic/Oncologic Medications  heparin  Injectable 5000 Unit(s) SubCutaneous every 8 hours    Antimicrobial/Immunologic Medications    Endocrine/Metabolic Medications    Topical/Other Medications    --------------------------------------------------------------------------------------    VITAL SIGNS, INS/OUTS (last 24 hours):  --------------------------------------------------------------------------------------  T(C): 36.6 (12-04-18 @ 21:15), Max: 36.6 (12-04-18 @ 21:15)  HR: 76 (12-04-18 @ 21:30) (76 - 115)  BP: 145/78 (12-04-18 @ 21:30) (140/73 - 184/79)  BP(mean): 88 (12-04-18 @ 21:30) (88 - 120)  ABP: --  ABP(mean): --  RR: 19 (12-04-18 @ 21:30) (18 - 23)  SpO2: 97% (12-04-18 @ 21:30) (96% - 100%)  Wt(kg): --  CVP(mm Hg): --  CI: --  CAPILLARY BLOOD GLUCOSE       N/A  --------------------------------------------------------------------------------------    EXAM  NEUROLOGY  Exam: Normal, NAD, alert, oriented x 3, no focal deficits.    HEENT  Exam: Normocephalic, atraumatic.  EOMI ***    RESPIRATORY  Exam: coarse breath sounds bilaterally, normal expansion/effort.   Mechanical Ventilation:     CARDIOVASCULAR  Exam: S1, S2.  Regular rate and rhythm    GI/NUTRITION  Exam: Abdomen soft, Non-tender, Non-distended.   Current Diet:  NPO    VASCULAR  Exam: Extremities warm, pink, well-perfused.  ***    MUSCULOSKELETAL  Exam: All extremities moving spontaneously without limitations.  ***    SKIN:  Exam: Good skin turgor, no skin breakdown.  ***    METABOLIC/FLUIDS/ELECTROLYTES  lactated ringers. 1000 milliLiter(s) IV Continuous <Continuous>      HEMATOLOGIC  [x] DVT Prophylaxis: heparin  Injectable 5000 Unit(s) SubCutaneous every 8 hours    Transfusions:	[] PRBC	[] Platelets		[] FFP	[] Cryoprecipitate    INFECTIOUS DISEASE  Antimicrobials/Immunologic Medications:    Day #  	of    ***    Tubes/Lines/Drains  ***  [x] Peripheral IV  [] Central Venous Line     	[] R	[] L	[] IJ	[] Fem	[] SC	Date Placed:   [] Arterial Line		[] R	[] L	[] Fem	[] Rad	[] Ax	Date Placed:   [] PICC:         	[] Midline		[] Mediport  [] Urinary Catheter		Date Placed:     LABS  --------------------------------------------------------------------------------------  CBC (12-04 @ 17:28)                              16.4                           8.55    )----------------(  202        76.1  % Neutrophils, 14.7  % Lymphocytes, ANC: 6.51                                46.2                  BMP (12-04 @ 17:28)             143     |  97<L>   |  25<H> 		Ca++ --      Ca 10.2               ---------------------------------( 129<H>		Mg --                 3.3<L>  |  30      |  1.22  			Ph --        LFTs (12-04 @ 17:28)      TPro 9.3<H> / Alb 4.9 / TBili 0.8 / DBili -- / AST 24 / ALT 13 / AlkPhos 138<H>    Coags (12-04 @ 17:28)  aPTT 31.7 / INR 1.10 / PT 12.2    ABG (12-04 @ 18:11)      /  /  /  /  / %     Lactate:   2.2<H>  ABG (12-04 @ 17:28)      /  /  /  /  / %     Lactate:   1.9    VBG (12-04 @ 18:11)     7.33 / 55<H> / 61<H> / 25 / 2.5 / 88.5<H>%  VBG (12-04 @ 17:28)     7.30<L> / 66<H> / 31<L> / 26 / 5.6 / 48.9<L>%      --------------------------------------------------------------------------------------    OTHER LABS    IMAGING RESULTS  Echo:   CT:   Xray:     ASSESSMENT:  68y Male     PLAN:  ***  Neurologic:   Respiratory:   Cardiovascular:   Gastrointestinal/Nutrition:   Renal/Genitourinary:   Hematologic:   Infectious Disease:   Tubes/Lines/Drains:   Endocrine:   Disposition:     --------------------------------------------------------------------------------------    Critical Care Diagnoses: SICU Consultation Note  =====================================================  HPI: 68M w/ h/o asthma, HTN, DM (diet controlled) and recently diagnosed left sided tonsillar mass s/p non-diagnostic tonsil biopsy in the office 11/13/18 who presented to the ED with SOB. At that time flexible laryngoscopy showed patent airway but inability to tolerate secretions with pooling of secretions in the hypopharynx and suspected aspiration. Awake nasal fiberoptic intubation was attempted by the anesthesia team in the ED however without success and the patient was then brought urgently to the OR for fiberoptic intubation, tracheotomy, direct laryngoscopy, biopsy. Patient did not desat lower than 89% and remained HD stable throughout. Patient is full code. Patient is already followed by MO and RO as an outpatient and was originally scheduled for a DLB, esophagoscopy tomorrow.     Surgery Information fiberoptic intubation, tracheotomy, direct laryngoscopy, biopsy.  Case Duration: 	EBL: 5 mL	IV Fluids: 	Blood Products: 	Urine Output:   Complications: none      Allergies:   PAST MEDICAL & SURGICAL HISTORY:  Sleep apnea  Asthma: Denies recent hospitalizations for asthma  Localized swelling, mass and lump, head  Tonsil cancer  DM (diabetes mellitus): Not on any medications  HTN (hypertension)  History of surgery: Right knee replacement - surgery  Forearm fracture    FAMILY HISTORY:  No pertinent family history in first degree relatives      ADVANCE DIRECTIVES: Presumed Full Code      REVIEW OF SYSTEMS:  All other review of systems negative, except as noted in HPI    HOME MEDICATIONS:    losartan 50mg q daily  HCTZ 12.5mg q daily  ASA 81mg q daily  ibuprofen 800mg q8hrs prn      CURRENT MEDICATIONS:   --------------------------------------------------------------------------------------  Neurologic Medications  acetaminophen  IVPB .. 1000 milliGRAM(s) IV Intermittent once PRN Mild Pain (1 - 3)  morphine  - Injectable 2 milliGRAM(s) IV Push every 6 hours PRN Moderate Pain (4 - 6)  morphine  - Injectable 4 milliGRAM(s) IV Push every 6 hours PRN Severe Pain (7 - 10)    Respiratory Medications    Cardiovascular Medications    Gastrointestinal Medications  lactated ringers. 1000 milliLiter(s) IV Continuous <Continuous>    Genitourinary Medications    Hematologic/Oncologic Medications  heparin  Injectable 5000 Unit(s) SubCutaneous every 8 hours    Antimicrobial/Immunologic Medications    Endocrine/Metabolic Medications    Topical/Other Medications    --------------------------------------------------------------------------------------    VITAL SIGNS, INS/OUTS (last 24 hours):  --------------------------------------------------------------------------------------  T(C): 36.6 (12-04-18 @ 21:15), Max: 36.6 (12-04-18 @ 21:15)  HR: 76 (12-04-18 @ 21:30) (76 - 115)  BP: 145/78 (12-04-18 @ 21:30) (140/73 - 184/79)  BP(mean): 88 (12-04-18 @ 21:30) (88 - 120)  ABP: --  ABP(mean): --  RR: 19 (12-04-18 @ 21:30) (18 - 23)  SpO2: 97% (12-04-18 @ 21:30) (96% - 100%)  Wt(kg): --  CVP(mm Hg): --  CI: --  CAPILLARY BLOOD GLUCOSE       N/A  --------------------------------------------------------------------------------------    EXAM  NEUROLOGY  Exam: Normal, NAD, alert, oriented x 3, no focal deficits, pt writing to communicate    HEENT  Exam: Ko tube in place, tongue protruding out of oral cavity, trach in place    RESPIRATORY  Exam: coarse breath sounds bilaterally, normal expansion/effort, breathing comfortably on trach collar  Mechanical Ventilation: trach collar    CARDIOVASCULAR  Exam: S1, S2.  Regular rate and rhythm    GI/NUTRITION  Exam: Abdomen soft, Non-tender, Non-distended.   Current Diet:  NPO    VASCULAR  Exam: Extremities warm, pink, well-perfused    MUSCULOSKELETAL  Exam: All extremities moving spontaneously without limitations.    SKIN:  Exam: Good skin turgor, no skin breakdown.    METABOLIC/FLUIDS/ELECTROLYTES  lactated ringers. 1000 milliLiter(s) IV Continuous <Continuous>      HEMATOLOGIC  [x] DVT Prophylaxis: heparin  Injectable 5000 Unit(s) SubCutaneous every 8 hours    Transfusions:	[] PRBC	[] Platelets		[] FFP	[] Cryoprecipitate    INFECTIOUS DISEASE  Antimicrobials/Immunologic Medications:    Day #  	of    ***    Tubes/Lines/Drains  ***  [x] Peripheral IV  [] Central Venous Line     	[] R	[] L	[] IJ	[] Fem	[] SC	Date Placed:   [] Arterial Line		[] R	[] L	[] Fem	[] Rad	[] Ax	Date Placed:   [] PICC:         	[] Midline		[] Mediport  [] Urinary Catheter		Date Placed:     LABS  --------------------------------------------------------------------------------------  CBC (12-04 @ 17:28)                              16.4                           8.55    )----------------(  202        76.1  % Neutrophils, 14.7  % Lymphocytes, ANC: 6.51                                46.2                  BMP (12-04 @ 17:28)             143     |  97<L>   |  25<H> 		Ca++ --      Ca 10.2               ---------------------------------( 129<H>		Mg --                 3.3<L>  |  30      |  1.22  			Ph --        LFTs (12-04 @ 17:28)      TPro 9.3<H> / Alb 4.9 / TBili 0.8 / DBili -- / AST 24 / ALT 13 / AlkPhos 138<H>    Coags (12-04 @ 17:28)  aPTT 31.7 / INR 1.10 / PT 12.2    ABG (12-04 @ 18:11)      /  /  /  /  / %     Lactate:   2.2<H>  ABG (12-04 @ 17:28)      /  /  /  /  / %     Lactate:   1.9    VBG (12-04 @ 18:11)     7.33 / 55<H> / 61<H> / 25 / 2.5 / 88.5<H>%  VBG (12-04 @ 17:28)     7.30<L> / 66<H> / 31<L> / 26 / 5.6 / 48.9<L>%      --------------------------------------------------------------------------------------    OTHER LABS    IMAGING RESULTS  Echo:   CT:   Xray: No focal patchy airspace consolidations, pleural effusions, pneumothorax,   or pulmonary edema.  Cardiac and mediastinal silhouettes grossly within normal limits for the   projection. Trachea midline. Unremarkable osseous structures.    ASSESSMENT:  68y Male w/ L tonsillar mass who presented to the ED with SOB with inability to tolerate his secretions s/p  fiberoptic intubation, tracheotomy, direct laryngoscopy, biopsy    PLAN:    Neurologic: pain control with IV tylenol/morphine prn  Respiratory: trach collar, post op CXR  Cardiovascular: will restart home BP meds when ko tube placement confirmed by CXR  Gastrointestinal/Nutrition: NPO/IVF, possible PEG placement tomorrow  Renal/Genitourinary: monitor electrolytes and replete prn  Hematologic: monitor H/H  Infectious Disease: N/A  Tubes/Lines/Drains: trach  Endocrine: monitor FS, ISS   Disposition: PACU

## 2018-12-04 NOTE — ED PROVIDER NOTE - MEDICAL DECISION MAKING DETAILS
imminent airway compromise with poor oropharyngeal anatomy and distorted external anatomy.  most appropriate plan is anaesthesia eval for airway with ENT scope/surgical backup.

## 2018-12-04 NOTE — ED PROVIDER NOTE - CHIEF COMPLAINT
The patient is a 68y Male complaining of The patient is a 68y Male complaining of airway obstruction

## 2018-12-04 NOTE — ED ADULT NURSE NOTE - NSIMPLEMENTINTERV_GEN_ALL_ED
Implemented All Fall Risk Interventions:  [place] to call system. Call bell, personal items and telephone within reach. Instruct patient to call for assistance. Room bathroom lighting operational. Non-slip footwear when patient is off stretcher. Physically safe environment: no spills, clutter or unnecessary equipment. Stretcher in lowest position, wheels locked, appropriate side rails in place. Provide visual cue, wrist band, yellow gown, etc. Monitor gait and stability. Monitor for mental status changes and reorient to person, place, and time. Review medications for side effects contributing to fall risk. Reinforce activity limits and safety measures with patient and family.

## 2018-12-05 ENCOUNTER — APPOINTMENT (OUTPATIENT)
Dept: OTOLARYNGOLOGY | Facility: HOSPITAL | Age: 68
End: 2018-12-05

## 2018-12-05 DIAGNOSIS — J98.8 OTHER SPECIFIED RESPIRATORY DISORDERS: ICD-10-CM

## 2018-12-05 DIAGNOSIS — C09.9 MALIGNANT NEOPLASM OF TONSIL, UNSPECIFIED: ICD-10-CM

## 2018-12-05 DIAGNOSIS — R22.0 LOCALIZED SWELLING, MASS AND LUMP, HEAD: ICD-10-CM

## 2018-12-05 LAB
APTT BLD: 28.4 SEC — SIGNIFICANT CHANGE UP (ref 27.5–36.3)
BLD GP AB SCN SERPL QL: NEGATIVE — SIGNIFICANT CHANGE UP
BUN SERPL-MCNC: 18 MG/DL — SIGNIFICANT CHANGE UP (ref 7–23)
BUN SERPL-MCNC: 20 MG/DL — SIGNIFICANT CHANGE UP (ref 7–23)
CALCIUM SERPL-MCNC: 9.2 MG/DL — SIGNIFICANT CHANGE UP (ref 8.4–10.5)
CALCIUM SERPL-MCNC: 9.3 MG/DL — SIGNIFICANT CHANGE UP (ref 8.4–10.5)
CHLORIDE SERPL-SCNC: 100 MMOL/L — SIGNIFICANT CHANGE UP (ref 98–107)
CHLORIDE SERPL-SCNC: 96 MMOL/L — LOW (ref 98–107)
CO2 SERPL-SCNC: 25 MMOL/L — SIGNIFICANT CHANGE UP (ref 22–31)
CO2 SERPL-SCNC: 26 MMOL/L — SIGNIFICANT CHANGE UP (ref 22–31)
CREAT SERPL-MCNC: 0.94 MG/DL — SIGNIFICANT CHANGE UP (ref 0.5–1.3)
CREAT SERPL-MCNC: 1.01 MG/DL — SIGNIFICANT CHANGE UP (ref 0.5–1.3)
GLUCOSE BLDC GLUCOMTR-MCNC: 104 MG/DL — HIGH (ref 70–99)
GLUCOSE BLDC GLUCOMTR-MCNC: 107 MG/DL — HIGH (ref 70–99)
GLUCOSE BLDC GLUCOMTR-MCNC: 125 MG/DL — HIGH (ref 70–99)
GLUCOSE SERPL-MCNC: 105 MG/DL — HIGH (ref 70–99)
GLUCOSE SERPL-MCNC: 98 MG/DL — SIGNIFICANT CHANGE UP (ref 70–99)
HCT VFR BLD CALC: 40.8 % — SIGNIFICANT CHANGE UP (ref 39–50)
HGB BLD-MCNC: 14.7 G/DL — SIGNIFICANT CHANGE UP (ref 13–17)
INR BLD: 1.08 — SIGNIFICANT CHANGE UP (ref 0.88–1.17)
MAGNESIUM SERPL-MCNC: 1.8 MG/DL — SIGNIFICANT CHANGE UP (ref 1.6–2.6)
MCHC RBC-ENTMCNC: 33.9 PG — SIGNIFICANT CHANGE UP (ref 27–34)
MCHC RBC-ENTMCNC: 36 % — SIGNIFICANT CHANGE UP (ref 32–36)
MCV RBC AUTO: 94 FL — SIGNIFICANT CHANGE UP (ref 80–100)
NRBC # FLD: 0 — SIGNIFICANT CHANGE UP
PHOSPHATE SERPL-MCNC: SIGNIFICANT CHANGE UP MG/DL (ref 2.5–4.5)
PLATELET # BLD AUTO: 211 K/UL — SIGNIFICANT CHANGE UP (ref 150–400)
PMV BLD: 12.3 FL — SIGNIFICANT CHANGE UP (ref 7–13)
POTASSIUM SERPL-MCNC: 3.1 MMOL/L — LOW (ref 3.5–5.3)
POTASSIUM SERPL-MCNC: SIGNIFICANT CHANGE UP MMOL/L (ref 3.5–5.3)
POTASSIUM SERPL-SCNC: 3.1 MMOL/L — LOW (ref 3.5–5.3)
POTASSIUM SERPL-SCNC: SIGNIFICANT CHANGE UP MMOL/L (ref 3.5–5.3)
PROTHROM AB SERPL-ACNC: 12.3 SEC — SIGNIFICANT CHANGE UP (ref 9.8–13.1)
RBC # BLD: 4.34 M/UL — SIGNIFICANT CHANGE UP (ref 4.2–5.8)
RBC # FLD: 12.1 % — SIGNIFICANT CHANGE UP (ref 10.3–14.5)
RH IG SCN BLD-IMP: POSITIVE — SIGNIFICANT CHANGE UP
SODIUM SERPL-SCNC: 139 MMOL/L — SIGNIFICANT CHANGE UP (ref 135–145)
SODIUM SERPL-SCNC: 142 MMOL/L — SIGNIFICANT CHANGE UP (ref 135–145)
WBC # BLD: 10.74 K/UL — HIGH (ref 3.8–10.5)
WBC # FLD AUTO: 10.74 K/UL — HIGH (ref 3.8–10.5)

## 2018-12-05 PROCEDURE — 99233 SBSQ HOSP IP/OBS HIGH 50: CPT

## 2018-12-05 PROCEDURE — 88188 FLOWCYTOMETRY/READ 9-15: CPT

## 2018-12-05 PROCEDURE — 99231 SBSQ HOSP IP/OBS SF/LOW 25: CPT | Mod: GC

## 2018-12-05 RX ORDER — OXYCODONE HYDROCHLORIDE 5 MG/1
10 TABLET ORAL EVERY 6 HOURS
Qty: 0 | Refills: 0 | Status: DISCONTINUED | OUTPATIENT
Start: 2018-12-05 | End: 2018-12-11

## 2018-12-05 RX ORDER — SENNA PLUS 8.6 MG/1
10 TABLET ORAL AT BEDTIME
Qty: 0 | Refills: 0 | Status: DISCONTINUED | OUTPATIENT
Start: 2018-12-05 | End: 2018-12-31

## 2018-12-05 RX ORDER — POTASSIUM CHLORIDE 20 MEQ
10 PACKET (EA) ORAL
Qty: 0 | Refills: 0 | Status: COMPLETED | OUTPATIENT
Start: 2018-12-05 | End: 2018-12-05

## 2018-12-05 RX ORDER — INSULIN LISPRO 100/ML
VIAL (ML) SUBCUTANEOUS AT BEDTIME
Qty: 0 | Refills: 0 | Status: DISCONTINUED | OUTPATIENT
Start: 2018-12-05 | End: 2018-12-08

## 2018-12-05 RX ORDER — HYDROCHLOROTHIAZIDE 25 MG
12.5 TABLET ORAL DAILY
Qty: 0 | Refills: 0 | Status: DISCONTINUED | OUTPATIENT
Start: 2018-12-05 | End: 2018-12-19

## 2018-12-05 RX ORDER — HYDROCHLOROTHIAZIDE 25 MG
12.5 TABLET ORAL DAILY
Qty: 0 | Refills: 0 | Status: DISCONTINUED | OUTPATIENT
Start: 2018-12-05 | End: 2018-12-05

## 2018-12-05 RX ORDER — LOSARTAN POTASSIUM 100 MG/1
50 TABLET, FILM COATED ORAL DAILY
Qty: 0 | Refills: 0 | Status: DISCONTINUED | OUTPATIENT
Start: 2018-12-05 | End: 2018-12-19

## 2018-12-05 RX ORDER — OXYCODONE HYDROCHLORIDE 5 MG/1
5 TABLET ORAL EVERY 6 HOURS
Qty: 0 | Refills: 0 | Status: DISCONTINUED | OUTPATIENT
Start: 2018-12-05 | End: 2018-12-11

## 2018-12-05 RX ORDER — DOCUSATE SODIUM 100 MG
100 CAPSULE ORAL
Qty: 0 | Refills: 0 | Status: DISCONTINUED | OUTPATIENT
Start: 2018-12-06 | End: 2018-12-31

## 2018-12-05 RX ORDER — ASPIRIN/CALCIUM CARB/MAGNESIUM 324 MG
81 TABLET ORAL DAILY
Qty: 0 | Refills: 0 | Status: DISCONTINUED | OUTPATIENT
Start: 2018-12-05 | End: 2018-12-22

## 2018-12-05 RX ADMIN — HEPARIN SODIUM 5000 UNIT(S): 5000 INJECTION INTRAVENOUS; SUBCUTANEOUS at 22:27

## 2018-12-05 RX ADMIN — Medication 100 MILLIEQUIVALENT(S): at 16:38

## 2018-12-05 RX ADMIN — HYDROMORPHONE HYDROCHLORIDE 0.5 MILLIGRAM(S): 2 INJECTION INTRAMUSCULAR; INTRAVENOUS; SUBCUTANEOUS at 10:05

## 2018-12-05 RX ADMIN — Medication 400 MILLIGRAM(S): at 14:09

## 2018-12-05 RX ADMIN — Medication 12.5 MILLIGRAM(S): at 16:38

## 2018-12-05 RX ADMIN — SODIUM CHLORIDE 100 MILLILITER(S): 9 INJECTION, SOLUTION INTRAVENOUS at 08:27

## 2018-12-05 RX ADMIN — Medication 100 MILLIEQUIVALENT(S): at 14:09

## 2018-12-05 RX ADMIN — HYDROMORPHONE HYDROCHLORIDE 0.5 MILLIGRAM(S): 2 INJECTION INTRAMUSCULAR; INTRAVENOUS; SUBCUTANEOUS at 09:50

## 2018-12-05 RX ADMIN — OXYCODONE HYDROCHLORIDE 10 MILLIGRAM(S): 5 TABLET ORAL at 20:48

## 2018-12-05 RX ADMIN — LOSARTAN POTASSIUM 50 MILLIGRAM(S): 100 TABLET, FILM COATED ORAL at 07:26

## 2018-12-05 RX ADMIN — HYDROMORPHONE HYDROCHLORIDE 0.5 MILLIGRAM(S): 2 INJECTION INTRAMUSCULAR; INTRAVENOUS; SUBCUTANEOUS at 08:40

## 2018-12-05 RX ADMIN — HYDROMORPHONE HYDROCHLORIDE 0.5 MILLIGRAM(S): 2 INJECTION INTRAMUSCULAR; INTRAVENOUS; SUBCUTANEOUS at 08:25

## 2018-12-05 RX ADMIN — Medication 100 MILLIEQUIVALENT(S): at 12:57

## 2018-12-05 RX ADMIN — Medication 81 MILLIGRAM(S): at 16:38

## 2018-12-05 RX ADMIN — OXYCODONE HYDROCHLORIDE 10 MILLIGRAM(S): 5 TABLET ORAL at 20:18

## 2018-12-05 RX ADMIN — Medication 1000 MILLIGRAM(S): at 14:25

## 2018-12-05 NOTE — PROGRESS NOTE ADULT - SUBJECTIVE AND OBJECTIVE BOX
Pt with tonsillar mass s/p trach w ENT last night  For EGD, PEG, possible open gastrostomy tube on Friday 12/7/18  will need NPO p MN Thursday  valid coagulation profile, valid type and screen  continue care per PACU/SICU team  please contact with concerns    Diane GARCIA [id number]

## 2018-12-05 NOTE — BRIEF OPERATIVE NOTE - PROCEDURE
<<-----Click on this checkbox to enter Procedure Tracheotomy  12/05/2018    Active  [email or web address]  Direct laryngoscopy  12/05/2018    Active  JFLAM

## 2018-12-05 NOTE — PROGRESS NOTE ADULT - ASSESSMENT
68y Male w/ L tonsillar mass who presented to the ED with SOB with inability to tolerate his secretions s/p  fiberoptic intubation, tracheotomy, direct laryngoscopy, biopsy , recovering without issue     PLAN:    Neurologic: pain control with IV tylenol/morphine prn  Respiratory: trach collar as toelrated  Cardiovascular: c/w home meds  Gastrointestinal/Nutrition: NPO/IVF, possible PEG placement today with Thoracic surgery  Renal/Genitourinary: monitor electrolytes and replete prn  Hematologic: monitor H/H  Infectious Disease: N/A  Tubes/Lines/Drains: trach  Endocrine: monitor FS, ISS   Disposition: PACU, likely downgrade to floor today    S Odette PGY-2  SICU

## 2018-12-05 NOTE — CONSULT NOTE ADULT - SUBJECTIVE AND OBJECTIVE BOX
Pt seen and examined. Full note to follow    Zhao Boykin PAC [id number] HPI:  68M w/ h/o asthma, htn, DM (diet controlled) and recently diagnosed left sided tonsillar mass s/p non-diagnostic tonsil biopsy in the office 11/13/18 who presented to the ED with SOB. At that time flexible laryngoscopy showed patent airway but inability to tolerate secretions with pooling of secretions in the hypopharynx and suspected aspiration. Awake nasal fiberoptic intubation was attempted by the anesthesia team in the ED however without success and the patient was then brought urgently to the OR for fiberoptic intubation, tracheotomy, direct laryngoscopy, biopsy. Patient did not desat lower than 89% and remained HD stable throughout. Patient is full code. Patient is already followed by MO and RO as an outpatient and was originally scheduled for a DLB, esophagoscopy tomorrow. (04 Dec 2018 22:06)    CTS consulted for possible PEG placement tomorrow.    PAST MEDICAL & SURGICAL HISTORY:  Sleep apnea  Asthma: Denies recent hospitalizations for asthma  Localized swelling, mass and lump, head  Tonsil cancer  DM (diabetes mellitus): Not on any medications  HTN (hypertension)  History of surgery: Right knee replacement - surgery  Forearm fracture      REVIEW OF SYSTEMS      General: No Weight change/ Fatigue/ HA/Dizzy	  Skin/Breast: No Rashes/ Lesions/ Masses  Ophthalmologic: No Blurry vision/ Glaucoma/ Blindness  ENMT: No Hearing loss/ Drainage/ Lesions	  Respiratory and Thorax: No Cough/ Wheezing/ SOB/ Hemoptysis/ Sputum production  Cardiovascular: No Chest pain/ Palpitations/ Diaphoresis	  Gastrointestinal: Decreased appetite. No Nausea/ Vomiting/ Constipation	  Genitourinary: No Heamturia/ Dysuria/ Frequency change/ Impotence	  Musculoskeletal: No Pain/ Weakness/ Claudication	  Neurological: No Seizures/ TIA/CVA/ Parastesias	  Psychiatric: No Dementia/ Depression/ SI/HI	  Hematology/Lymphatics: No hx of bleeding/ Edema	  Endocrine:	No Hyperglycemia/ Hypoglycemia  Allergic/Immunologic:	 No Anaphylaxis/ Intolerance/ Recent illnesses    MEDICATIONS  (STANDING):  dextrose 5%. 1000 milliLiter(s) (50 mL/Hr) IV Continuous <Continuous>  dextrose 50% Injectable 12.5 Gram(s) IV Push once  dextrose 50% Injectable 25 Gram(s) IV Push once  dextrose 50% Injectable 25 Gram(s) IV Push once  heparin  Injectable 5000 Unit(s) SubCutaneous every 8 hours  insulin lispro (HumaLOG) corrective regimen sliding scale   SubCutaneous three times a day before meals  lactated ringers. 1000 milliLiter(s) (100 mL/Hr) IV Continuous <Continuous>  losartan 50 milliGRAM(s) Oral daily    MEDICATIONS  (PRN):  acetaminophen  IVPB .. 1000 milliGRAM(s) IV Intermittent once PRN Mild Pain (1 - 3)  dextrose 40% Gel 15 Gram(s) Oral once PRN Blood Glucose LESS THAN 70 milliGRAM(s)/deciliter  glucagon  Injectable 1 milliGRAM(s) IntraMuscular once PRN Glucose LESS THAN 70 milligrams/deciliter  HYDROmorphone  Injectable 0.5 milliGRAM(s) IV Push every 10 minutes PRN Severe Pain (7 - 10)  morphine  - Injectable 2 milliGRAM(s) IV Push every 6 hours PRN Moderate Pain (4 - 6)  morphine  - Injectable 4 milliGRAM(s) IV Push every 6 hours PRN Severe Pain (7 - 10)  ondansetron Injectable 4 milliGRAM(s) IV Push once PRN Nausea and/or Vomiting      Allergies    No Known Allergies    Intolerances        SOCIAL HISTORY:  Occupation:  Smoking Hx: denies  Etoh Hx: denies  IVDA Hx: denies    FAMILY HISTORY:  No pertinent family history in first degree relatives    unless noted, no significant family hx with Mother, Father, Siblings    Vital Signs Last 24 Hrs  T(C): 36.7 (05 Dec 2018 00:03), Max: 36.7 (05 Dec 2018 00:03)  T(F): 98.1 (05 Dec 2018 00:03), Max: 98.1 (05 Dec 2018 00:03)  HR: 91 (05 Dec 2018 00:03) (74 - 115)  BP: 154/72 (05 Dec 2018 00:03) (140/73 - 184/79)  BP(mean): 90 (05 Dec 2018 00:03) (88 - 120)  RR: 22 (05 Dec 2018 00:03) (10 - 23)  SpO2: 100% (05 Dec 2018 00:03) (96% - 100%)    General: WN/WD NAD  Neurology: Awake, nonfocal, SHERIDAN x 4  Eyes: Scleras clear, PERRLA/ EOMI, Gross vision intact  ENT:Gross hearing intact, grossly patent pharynx, no stridor  Neck: Neck supple, trachea midline, No JVD,   Respiratory: CTA B/L, No wheezing, rales, rhonchi  CV: RRR, S1S2, no murmurs, rubs or gallops  Abdominal: Soft, NT, ND +BS,   Extremities: No edema, + peripheral pulses  Skin: No Rashes, Hematoma, Ecchymosis  Lymphatic: No Neck, axilla, groin LAD  Psych: Oriented x 3, normal affect  Incisions:   Tubes: Trach in place, R NG tube in place    LABS:                        16.4   8.55  )-----------( 202      ( 04 Dec 2018 17:28 )             46.2     12-04    143  |  97<L>  |  25<H>  ----------------------------<  129<H>  3.3<L>   |  30  |  1.22    Ca    10.2      04 Dec 2018 17:28    TPro  9.3<H>  /  Alb  4.9  /  TBili  0.8  /  DBili  x   /  AST  24  /  ALT  13  /  AlkPhos  138<H>  12-04    PT/INR - ( 04 Dec 2018 17:28 )   PT: 12.2 SEC;   INR: 1.10          PTT - ( 04 Dec 2018 17:28 )  PTT:31.7 SEC      RADIOLOGY & ADDITIONAL STUDIES:    ASSESSMENT:   68yMalePAST MEDICAL & SURGICAL HISTORY:  Sleep apnea  Asthma: Denies recent hospitalizations for asthma  Localized swelling, mass and lump, head  Tonsil cancer  DM (diabetes mellitus): Not on any medications  HTN (hypertension)  History of surgery: Right knee replacement - surgery  Forearm fracture  HEALTH ISSUES - PROBLEM Dx:      HEALTH ISSUES - R/O PROBLEM Dx:      PLAN:

## 2018-12-05 NOTE — PROGRESS NOTE ADULT - SUBJECTIVE AND OBJECTIVE BOX
24 HOUR EVENTS: No acute events overnight. Tolerated trach collar without issue.     HISTORY  68M w/ h/o asthma, HTN, DM (diet controlled) and recently diagnosed left sided tonsillar mass s/p non-diagnostic tonsil biopsy in the office 11/13/18 who presented to the ED with SOB. At that time flexible laryngoscopy showed patent airway but inability to tolerate secretions with pooling of secretions in the hypopharynx and suspected aspiration. Awake nasal fiberoptic intubation was attempted by the anesthesia team in the ED however without success and the patient was then brought urgently to the OR for fiberoptic intubation, tracheotomy, direct laryngoscopy, biopsy. Patient did not desat lower than 89% and remained HD stable throughout. Patient is full code. Patient is already followed by MO and RO as an outpatient and was originally scheduled for a DLB, esophagoscopy tomorrow.     Surgery Information fiberoptic intubation, tracheotomy, direct laryngoscopy, biopsy.  Case Duration: 	EBL: 5 mL	IV Fluids: 	Blood Products: 	Urine Output:   Complications: none    SUBJECTIVE/ROS:  [x ] A ten-point review of systems was otherwise negative except as noted.  [ ] Due to altered mental status/intubation, subjective information were not able to be obtained from the patient. History was obtained, to the extent possible, from review of the chart and collateral sources of information.    NEURO  RASS:     GCS:     CAM ICU:  Exam: awake, alert, oriented  Meds: acetaminophen  IVPB .. 1000 milliGRAM(s) IV Intermittent once PRN Mild Pain (1 - 3)  HYDROmorphone  Injectable 0.5 milliGRAM(s) IV Push every 10 minutes PRN Severe Pain (7 - 10)  morphine  - Injectable 2 milliGRAM(s) IV Push every 6 hours PRN Moderate Pain (4 - 6)  morphine  - Injectable 4 milliGRAM(s) IV Push every 6 hours PRN Severe Pain (7 - 10)  ondansetron Injectable 4 milliGRAM(s) IV Push once PRN Nausea and/or Vomiting    [x] Adequacy of sedation and pain control has been assessed and adjusted    RESPIRATORY  RR: 22 (12-05-18 @ 07:00) (10 - 23)  SpO2: 100% (12-05-18 @ 07:00) (96% - 100%)  Exam: Trach on Trach collar       CARDIOVASCULAR  HR: 98 (12-05-18 @ 07:00) (74 - 115)  BP: 179/94 (12-05-18 @ 07:00) (140/73 - 184/79)  BP(mean): 110 (12-05-18 @ 07:00) (86 - 120)  VBG - ( 04 Dec 2018 18:11 )  pH: 7.33  /  pCO2: 55    /  pO2: 61    / HCO3: 25    / Base Excess: 2.5   /  SaO2: 88.5   Lactate: 2.2        Exam: regular rate and rhythm  Cardiac Rhythm: sinus  Perfusion     [x]Adequate   [ ]Inadequate  Mentation   [x]Normal       [ ]Reduced  Extremities  [x]Warm         [ ]Cool  Volume Status [ ]Hypervolemic [x]Euvolemic [ ]Hypovolemic  Meds: losartan 50 milliGRAM(s) Oral daily      GI/NUTRITION  Exam: soft, nontender, nondistended. KVO in place.   Diet: NPO   Meds: senna Syrup 10 milliLiter(s) Oral at bedtime      GENITOURINARY  I&O's Detail    12-04 @ 07:01  -  12-05 @ 07:00  --------------------------------------------------------  IN:    lactated ringers.: 950 mL  Total IN: 950 mL    OUT:    Voided: 600 mL  Total OUT: 600 mL    Total NET: 350 mL          12-04    143  |  97<L>  |  25<H>  ----------------------------<  129<H>  3.3<L>   |  30  |  1.22    Ca    10.2      04 Dec 2018 17:28    TPro  9.3<H>  /  Alb  4.9  /  TBili  0.8  /  DBili  x   /  AST  24  /  ALT  13  /  AlkPhos  138<H>  12-04    [x ] Tran catheter, indication: N/A  Meds: dextrose 5%. 1000 milliLiter(s) IV Continuous <Continuous>  lactated ringers. 1000 milliLiter(s) IV Continuous <Continuous>      HEMATOLOGIC  Meds: heparin  Injectable 5000 Unit(s) SubCutaneous every 8 hours    [x] VTE Prophylaxis                        16.4   8.55  )-----------( 202      ( 04 Dec 2018 17:28 )             46.2     PT/INR - ( 04 Dec 2018 17:28 )   PT: 12.2 SEC;   INR: 1.10          PTT - ( 04 Dec 2018 17:28 )  PTT:31.7 SEC  Transfusion     [0 ] PRBC   [0 ] Platelets   [0 ] FFP   [ 0] Cryoprecipitate    INFECTIOUS DISEASES  WBC Count: 8.55 K/uL (12-04 @ 17:28)    RECENT CULTURES:    Meds:     ENDOCRINE  CAPILLARY BLOOD GLUCOSE      POCT Blood Glucose.: 107 mg/dL (05 Dec 2018 06:54)  POCT Blood Glucose.: 128 mg/dL (04 Dec 2018 23:40)    Meds: dextrose 40% Gel 15 Gram(s) Oral once PRN  dextrose 50% Injectable 12.5 Gram(s) IV Push once  dextrose 50% Injectable 25 Gram(s) IV Push once  dextrose 50% Injectable 25 Gram(s) IV Push once  glucagon  Injectable 1 milliGRAM(s) IntraMuscular once PRN  insulin lispro (HumaLOG) corrective regimen sliding scale   SubCutaneous three times a day before meals      ACCESS DEVICES:  [x ] Peripheral IV  [ ] Central Venous Line	[ ] R	[ ] L	[ ] IJ	[ ] Fem	[ ] SC	Placed:   [ ] Arterial Line		[ ] R	[ ] L	[ ] Fem	[ ] Rad	[ ] Ax	Placed:   [ ] PICC:					[ ] Mediport  [ ] Urinary Catheter, Date Placed:   [x] Necessity of urinary, arterial, and venous catheters discussed    OTHER MEDICATIONS:      CODE STATUS: FULL      IMAGING:< from: Xray Chest 1 View- PORTABLE-Urgent (11.30.18 @ 15:03) >  IMPRESSION:  No focal patchy airspace consolidations, pleural effusions, pneumothorax,   or pulmonary edema.     Cardiac and mediastinal silhouettes grossly within normal limits for the   projection.    Trachea midline.    Unremarkable osseous structures.    < end of copied text >

## 2018-12-05 NOTE — PROGRESS NOTE ADULT - SUBJECTIVE AND OBJECTIVE BOX
Patient seen and examined at the bedside.    No acute events since out of the OR, has remained on humidified TC since, trach cuff deflated this morning, large amount of thick secretions mixed with blood suctioned, CTS consulted for Gtube placement possibly later today, NGT position confirmed on CXR.    T(C): 36.4 (12-05-18 @ 08:00), Max: 36.8 (12-05-18 @ 05:00)  HR: 79 (12-05-18 @ 08:00) (74 - 115)  BP: 167/89 (12-05-18 @ 08:00) (140/73 - 184/79)  RR: 23 (12-05-18 @ 08:00) (10 - 23)  SpO2: 100% (12-05-18 @ 08:00) (96% - 100%)    NAD, alert, awake  Breathing comfortably on TC  NC: NGT in R nare sutured in place  OC/OP: tongue large protruding out of OC, no bleeding  Neck: 6LPC in place secured with trach tie and sutures x2, cuff deflated, large amount of thick secretions mixed with blood suctioned, 6cm conglomerate of left level II/III neck nodes    WBC 8.5 -> 10.7 after IV steroids  h/h 16.4/46 -> 14.7/40    A/P  68M w/ L tonsillar mass who presented to the ED with SOB with inability to tolerate his secretions s/p DLB, trach doing well post-op.  -transfer to floor w/ cont pulse ox  -f/u final CXR read, NGT is in place  -NPO/IVF for possible Gtube  -pain control prn  -home meds  -will need SLP evaluation prior to any PO intake  -routine trach care, trach teaching  -DM control -FSG < 130, ISS  -will follow-up with his MO/RO team upon discharge  -f/u path  -scds; sqh held this morning in anticipation for possible PEG  -PT/OT once on the floor

## 2018-12-05 NOTE — PROGRESS NOTE ADULT - SUBJECTIVE AND OBJECTIVE BOX
POST ANESTHESIA EVALUATION    68y Male POSTOP DAY 1    MENTAL STATUS: Patient participation [x  ] Awake     [  ] Arousable     [  ] Sedated    AIRWAY PATENCY: [  x] Satisfactory  [  ] Other: Trach Collar    Vital Signs Last 24 Hrs  T(C): 36.4 (05 Dec 2018 08:00), Max: 36.8 (05 Dec 2018 05:00)  T(F): 97.5 (05 Dec 2018 08:00), Max: 98.2 (05 Dec 2018 05:00)  HR: 77 (05 Dec 2018 09:00) (74 - 115)  BP: 170/82 (05 Dec 2018 09:00) (140/73 - 184/79)  BP(mean): 101 (05 Dec 2018 09:00) (86 - 120)  RR: 19 (05 Dec 2018 09:00) (10 - 23)  SpO2: 100% (05 Dec 2018 09:00) (96% - 100%)  I&O's Summary    04 Dec 2018 07:01  -  05 Dec 2018 07:00  --------------------------------------------------------  IN: 950 mL / OUT: 600 mL / NET: 350 mL          NAUSEA/ VOMITTING:  [ x ] NONE  [  ] CONTROLLED [  ] OTHER     PAIN: [ x ] CONTROLLED WITH CURRENT REGIMEN  [  ] OTHER    [ x ] NO APPARENT ANESTHESIA COMPLICATIONS      Comments:

## 2018-12-05 NOTE — PROGRESS NOTE ADULT - SUBJECTIVE AND OBJECTIVE BOX
Patient is a 68y old  Male who presents with a chief complaint of respiratory failure (05 Dec 2018 10:32)      HPI:  68M w/ h/o asthma, htn, DM (diet controlled) and recently diagnosed left sided tonsillar mass s/p non-diagnostic tonsil biopsy in the office 11/13/18 who presented to the ED with SOB. At that time flexible laryngoscopy showed patent airway but inability to tolerate secretions with pooling of secretions in the hypopharynx and suspected aspiration. Awake nasal fiberoptic intubation was attempted by the anesthesia team in the ED however without success and the patient was then brought urgently to the OR for fiberoptic intubation, tracheotomy, direct laryngoscopy, biopsy. Patient did not desat lower than 89% and remained HD stable throughout. Patient is full code. Patient is already followed by MO and RO as an outpatient and was originally scheduled for a DLB, esophagoscopy tomorrow. (04 Dec 2018 22:06)      Interval Events: Pt underwent trach and DL with biopsy yesterday.  Pt doing well post-op    REVIEW OF SYSTEMS:    CONSTITUTIONAL: No weakness, fevers or chills  EYES/ENT: No visual changes;  No vertigo or throat pain   NECK: Decreased ROM  RESPIRATORY: No issues after trach  CARDIOVASCULAR: No chest pain or palpitations  GASTROINTESTINAL: No abdominal or epigastric pain. No nausea, vomiting, or hematemesis; No diarrhea or constipation. No melena or hematochezia.  Pt unable to swallow  GENITOURINARY: No dysuria, frequency or hematuria  NEUROLOGICAL: No numbness or weakness  SKIN: No itching, burning, rashes, or lesions   All other review of systems is negative unless indicated above.    MEDICATIONS  (STANDING):  aspirin  chewable 81 milliGRAM(s) Oral daily  dextrose 5%. 1000 milliLiter(s) (50 mL/Hr) IV Continuous <Continuous>  dextrose 50% Injectable 12.5 Gram(s) IV Push once  dextrose 50% Injectable 25 Gram(s) IV Push once  dextrose 50% Injectable 25 Gram(s) IV Push once  heparin  Injectable 5000 Unit(s) SubCutaneous every 8 hours  hydrochlorothiazide 12.5 milliGRAM(s) Oral daily  insulin lispro (HumaLOG) corrective regimen sliding scale   SubCutaneous three times a day before meals  losartan 50 milliGRAM(s) Oral daily  senna Syrup 10 milliLiter(s) Oral at bedtime    MEDICATIONS  (PRN):  dextrose 40% Gel 15 Gram(s) Oral once PRN Blood Glucose LESS THAN 70 milliGRAM(s)/deciliter  glucagon  Injectable 1 milliGRAM(s) IntraMuscular once PRN Glucose LESS THAN 70 milligrams/deciliter  ondansetron Injectable 4 milliGRAM(s) IV Push once PRN Nausea and/or Vomiting  oxyCODONE    Solution 5 milliGRAM(s) Oral every 6 hours PRN Moderate Pain (4 - 6)  oxyCODONE    Solution 10 milliGRAM(s) Oral every 6 hours PRN Severe Pain (7 - 10)                            14.7   10.74 )-----------( 211      ( 05 Dec 2018 06:00 )             40.8     12-05    142  |  100  |  18  ----------------------------<  105<H>  3.1<L>   |  26  |  0.94    Ca    9.2      05 Dec 2018 11:15  Phos  Test not performed SPECIMEN GROSSLY HEMOLYZED     12-05  Mg     1.8     12-05    TPro  9.3<H>  /  Alb  4.9  /  TBili  0.8  /  DBili  x   /  AST  24  /  ALT  13  /  AlkPhos  138<H>  12-04    I&O's Detail    04 Dec 2018 07:01  -  05 Dec 2018 07:00  --------------------------------------------------------  IN:    lactated ringers.: 950 mL  Total IN: 950 mL    OUT:    Voided: 600 mL  Total OUT: 600 mL    Total NET: 350 mL      05 Dec 2018 07:01  -  05 Dec 2018 17:43  --------------------------------------------------------  IN:    IV PiggyBack: 100 mL    lactated ringers.: 700 mL  Total IN: 800 mL    OUT:    Voided: 550 mL  Total OUT: 550 mL    Total NET: 250 mL    Vital Signs Last 24 Hrs  T(C): 36.7 (05 Dec 2018 14:00), Max: 36.8 (05 Dec 2018 05:00)  T(F): 98 (05 Dec 2018 14:00), Max: 98.2 (05 Dec 2018 05:00)  HR: 75 (05 Dec 2018 16:30) (74 - 111)  BP: 152/74 (05 Dec 2018 16:30) (140/73 - 184/79)  BP(mean): 106 (05 Dec 2018 12:00) (86 - 120)  RR: 18 (05 Dec 2018 16:30) (10 - 23)  SpO2: 95% (05 Dec 2018 16:30) (94% - 100%)      PHYSICAL EXAM:  Constitutional: Awake and alert    Psychiatric: age appropriate behavior, cooperative    Skin: no obvious skin lesions    Lymphatic: no cervical lymphadenopathy    Ears: WNL    External Nose:  Normal, no structural deformities  		  Anterior Nasal Cavity: NGT in place  					  Oral Cavity:  Tongue edema unchanged    Neck: No palpable lymphadenopathy    Tracheostomy Site: Normal with no evidence of breakdown or excoriation  	Tracheostomy size and type:    Pulmonary: No Acute Distress.     Neurologic: awake and alert

## 2018-12-05 NOTE — CONSULT NOTE ADULT - ASSESSMENT
68M w/ L tonsillar mass who presented to the ED with SOB s/p trach placement. CTS consulted for PEG    - Keep NPO except meds in case of add on PEG tomorrow.   - Will discuss case with Dr. Matthews tomorrow morning    Zhao Boykin PAC [id number]

## 2018-12-06 ENCOUNTER — TRANSCRIPTION ENCOUNTER (OUTPATIENT)
Age: 68
End: 2018-12-06

## 2018-12-06 DIAGNOSIS — Z71.89 OTHER SPECIFIED COUNSELING: ICD-10-CM

## 2018-12-06 LAB
BUN SERPL-MCNC: 23 MG/DL — SIGNIFICANT CHANGE UP (ref 7–23)
BUN SERPL-MCNC: 23 MG/DL — SIGNIFICANT CHANGE UP (ref 7–23)
CALCIUM SERPL-MCNC: 9.6 MG/DL — SIGNIFICANT CHANGE UP (ref 8.4–10.5)
CALCIUM SERPL-MCNC: 9.6 MG/DL — SIGNIFICANT CHANGE UP (ref 8.4–10.5)
CHLORIDE SERPL-SCNC: 98 MMOL/L — SIGNIFICANT CHANGE UP (ref 98–107)
CHLORIDE SERPL-SCNC: 98 MMOL/L — SIGNIFICANT CHANGE UP (ref 98–107)
CO2 SERPL-SCNC: 30 MMOL/L — SIGNIFICANT CHANGE UP (ref 22–31)
CO2 SERPL-SCNC: 30 MMOL/L — SIGNIFICANT CHANGE UP (ref 22–31)
CREAT SERPL-MCNC: 1.02 MG/DL — SIGNIFICANT CHANGE UP (ref 0.5–1.3)
CREAT SERPL-MCNC: 1.02 MG/DL — SIGNIFICANT CHANGE UP (ref 0.5–1.3)
GLUCOSE BLDC GLUCOMTR-MCNC: 126 MG/DL — HIGH (ref 70–99)
GLUCOSE BLDC GLUCOMTR-MCNC: 135 MG/DL — HIGH (ref 70–99)
GLUCOSE SERPL-MCNC: 128 MG/DL — HIGH (ref 70–99)
GLUCOSE SERPL-MCNC: 128 MG/DL — HIGH (ref 70–99)
HCT VFR BLD CALC: 40.7 % — SIGNIFICANT CHANGE UP (ref 39–50)
HGB BLD-MCNC: 14.1 G/DL — SIGNIFICANT CHANGE UP (ref 13–17)
MAGNESIUM SERPL-MCNC: 1.9 MG/DL — SIGNIFICANT CHANGE UP (ref 1.6–2.6)
MCHC RBC-ENTMCNC: 32.9 PG — SIGNIFICANT CHANGE UP (ref 27–34)
MCHC RBC-ENTMCNC: 34.6 % — SIGNIFICANT CHANGE UP (ref 32–36)
MCV RBC AUTO: 95.1 FL — SIGNIFICANT CHANGE UP (ref 80–100)
NRBC # FLD: 0 — SIGNIFICANT CHANGE UP
PHOSPHATE SERPL-MCNC: 2.1 MG/DL — LOW (ref 2.5–4.5)
PLATELET # BLD AUTO: 178 K/UL — SIGNIFICANT CHANGE UP (ref 150–400)
PMV BLD: 11.4 FL — SIGNIFICANT CHANGE UP (ref 7–13)
POTASSIUM SERPL-MCNC: 3.3 MMOL/L — LOW (ref 3.5–5.3)
POTASSIUM SERPL-MCNC: 3.3 MMOL/L — LOW (ref 3.5–5.3)
POTASSIUM SERPL-SCNC: 3.3 MMOL/L — LOW (ref 3.5–5.3)
POTASSIUM SERPL-SCNC: 3.3 MMOL/L — LOW (ref 3.5–5.3)
RBC # BLD: 4.28 M/UL — SIGNIFICANT CHANGE UP (ref 4.2–5.8)
RBC # FLD: 12.3 % — SIGNIFICANT CHANGE UP (ref 10.3–14.5)
SODIUM SERPL-SCNC: 141 MMOL/L — SIGNIFICANT CHANGE UP (ref 135–145)
SODIUM SERPL-SCNC: 141 MMOL/L — SIGNIFICANT CHANGE UP (ref 135–145)
TM INTERPRETATION: SIGNIFICANT CHANGE UP
WBC # BLD: 10.39 K/UL — SIGNIFICANT CHANGE UP (ref 3.8–10.5)
WBC # FLD AUTO: 10.39 K/UL — SIGNIFICANT CHANGE UP (ref 3.8–10.5)

## 2018-12-06 RX ORDER — ACETAMINOPHEN 500 MG
650 TABLET ORAL EVERY 6 HOURS
Qty: 0 | Refills: 0 | Status: DISCONTINUED | OUTPATIENT
Start: 2018-12-06 | End: 2018-12-31

## 2018-12-06 RX ORDER — ACETAMINOPHEN 500 MG
1000 TABLET ORAL ONCE
Qty: 0 | Refills: 0 | Status: COMPLETED | OUTPATIENT
Start: 2018-12-06 | End: 2018-12-06

## 2018-12-06 RX ORDER — IBUPROFEN 200 MG
2 TABLET ORAL
Qty: 0 | Refills: 0 | COMMUNITY

## 2018-12-06 RX ORDER — POTASSIUM PHOSPHATE, MONOBASIC POTASSIUM PHOSPHATE, DIBASIC 236; 224 MG/ML; MG/ML
15 INJECTION, SOLUTION INTRAVENOUS ONCE
Qty: 0 | Refills: 0 | Status: COMPLETED | OUTPATIENT
Start: 2018-12-06 | End: 2018-12-06

## 2018-12-06 RX ORDER — SODIUM CHLORIDE 9 MG/ML
1000 INJECTION, SOLUTION INTRAVENOUS
Qty: 0 | Refills: 0 | Status: DISCONTINUED | OUTPATIENT
Start: 2018-12-06 | End: 2018-12-07

## 2018-12-06 RX ADMIN — HEPARIN SODIUM 5000 UNIT(S): 5000 INJECTION INTRAVENOUS; SUBCUTANEOUS at 22:16

## 2018-12-06 RX ADMIN — OXYCODONE HYDROCHLORIDE 10 MILLIGRAM(S): 5 TABLET ORAL at 16:40

## 2018-12-06 RX ADMIN — HEPARIN SODIUM 5000 UNIT(S): 5000 INJECTION INTRAVENOUS; SUBCUTANEOUS at 13:34

## 2018-12-06 RX ADMIN — OXYCODONE HYDROCHLORIDE 5 MILLIGRAM(S): 5 TABLET ORAL at 11:55

## 2018-12-06 RX ADMIN — Medication 400 MILLIGRAM(S): at 02:57

## 2018-12-06 RX ADMIN — HEPARIN SODIUM 5000 UNIT(S): 5000 INJECTION INTRAVENOUS; SUBCUTANEOUS at 06:14

## 2018-12-06 RX ADMIN — LOSARTAN POTASSIUM 50 MILLIGRAM(S): 100 TABLET, FILM COATED ORAL at 06:13

## 2018-12-06 RX ADMIN — OXYCODONE HYDROCHLORIDE 5 MILLIGRAM(S): 5 TABLET ORAL at 12:10

## 2018-12-06 RX ADMIN — SENNA PLUS 10 MILLILITER(S): 8.6 TABLET ORAL at 22:16

## 2018-12-06 RX ADMIN — OXYCODONE HYDROCHLORIDE 10 MILLIGRAM(S): 5 TABLET ORAL at 07:08

## 2018-12-06 RX ADMIN — POTASSIUM PHOSPHATE, MONOBASIC POTASSIUM PHOSPHATE, DIBASIC 62.5 MILLIMOLE(S): 236; 224 INJECTION, SOLUTION INTRAVENOUS at 16:04

## 2018-12-06 RX ADMIN — Medication 81 MILLIGRAM(S): at 11:55

## 2018-12-06 RX ADMIN — OXYCODONE HYDROCHLORIDE 10 MILLIGRAM(S): 5 TABLET ORAL at 06:13

## 2018-12-06 RX ADMIN — Medication 100 MILLIGRAM(S): at 06:13

## 2018-12-06 RX ADMIN — Medication 12.5 MILLIGRAM(S): at 06:13

## 2018-12-06 RX ADMIN — Medication 100 MILLIGRAM(S): at 18:09

## 2018-12-06 RX ADMIN — OXYCODONE HYDROCHLORIDE 10 MILLIGRAM(S): 5 TABLET ORAL at 16:24

## 2018-12-06 NOTE — SWALLOW BEDSIDE ASSESSMENT ADULT - PHARYNGEAL PHASE
Multiple swallows/Delayed pharyngeal swallow/Decreased laryngeal elevation Delayed pharyngeal swallow/Decreased laryngeal elevation/Coughing post swallow accompanied by expectoration of green tinged secretions through trach site/Multiple swallows

## 2018-12-06 NOTE — PROGRESS NOTE ADULT - SUBJECTIVE AND OBJECTIVE BOX
pt had swallow study that confirmed NPO status today  plan for EGD, PEG, possible open G tube tomorrow 12/7/18  NPO p MN  valid type and screen  valid coagulation profile  please contact with concerns    Diane GARCIA [id number]

## 2018-12-06 NOTE — DISCHARGE NOTE ADULT - MEDICATION SUMMARY - MEDICATIONS TO TAKE
I will START or STAY ON the medications listed below when I get home from the hospital:    oxyCODONE 5 mg/5 mL oral solution  -- 10 milliliter(s) by mouth every 6 hours, As needed, Severe Pain (7 - 10)  -- Indication: For pain     allopurinol 300 mg oral tablet  -- 1 tab(s) by mouth once a day  -- Indication: For Diffuse large B-cell lymphoma of lymph nodes of neck    carvedilol 3.125 mg oral tablet  -- 1 tab(s) by mouth every 12 hours  -- Indication: For HTN (hypertension)    senna 8.8 mg/5 mL oral syrup  -- 10 milliliter(s) by mouth once a day (at bedtime)  -- Indication: For constipation    docusate sodium 10 mg/mL oral liquid  -- 10 milliliter(s) by mouth 2 times a day  -- Indication: For constipation    pantoprazole 40 mg oral granule, delayed release  -- 40 milligram(s) by mouth once a day  -- Indication: For Stomach ulcers I will START or STAY ON the medications listed below when I get home from the hospital:    oxyCODONE 5 mg/5 mL oral solution  -- 10 milliliter(s) by mouth every 6 hours, As needed, Severe Pain (7 - 10) MDD:40ml  -- Indication: For Severe pain    allopurinol 300 mg oral tablet  -- 1 tab(s) by mouth once a day  -- Indication: For gout    carvedilol 3.125 mg oral tablet  -- 1 tab(s) by mouth every 12 hours  -- Indication: For Hypertension, unspecified type    senna 8.8 mg/5 mL oral syrup  -- 10 milliliter(s) by mouth once a day (at bedtime)  -- Indication: For constipation    docusate sodium 10 mg/mL oral liquid  -- 10 milliliter(s) by mouth 2 times a day  -- Indication: For Stool softner    pantoprazole 40 mg oral granule, delayed release  -- 40 milligram(s) by mouth once a day  -- Indication: For Stomach protection I will START or STAY ON the medications listed below when I get home from the hospital:    oxyCODONE 5 mg/5 mL oral solution  -- 10 milliliter(s) by mouth every 6 hours, As needed, Severe Pain (7 - 10) MDD:40ml  -- Indication: For Severe pain    allopurinol 300 mg oral tablet  -- 1 tab(s) by mouth once a day  -- Indication: For gout    carvedilol 3.125 mg oral tablet  -- 1 tab(s) by mouth every 12 hours  -- Indication: For HTN (hypertension)    senna 8.8 mg/5 mL oral syrup  -- 10 milliliter(s) by mouth once a day (at bedtime)  -- Indication: For constipation    docusate sodium 10 mg/mL oral liquid  -- 10 milliliter(s) by mouth 2 times a day  -- Indication: For Stool softner    pantoprazole 40 mg oral granule, delayed release  -- 40 milligram(s) by mouth once a day  -- Indication: For Stomach protection

## 2018-12-06 NOTE — DISCHARGE NOTE ADULT - HOSPITAL COURSE
68M w/ h/o asthma, htn, DM (diet controlled) and recently diagnosed left sided tonsillar mass s/p non-diagnostic tonsil biopsy in the office 11/13/18 who presented to the ED with SOB s/p tracheostomy and pathology now confirming DLBCL GCB subtype. FISH negative for BCL6, MYC, MYC/IGH and IGH/BLC2 translocation. Pt also had an excisional LN bx on 12/15. CT C/A/P with indeterminate adrenal nodules only, hep panel negative, TTE with EF of 35%  - s/p BM biopsy on 12/19/18  - Treatment plan is for R-CEOP regimen  - Day 1 (12/20): Rituxan and prednisone 100 mg daily x 5 days  - Day 2 (12/21): cytoxan and vincristine  - Day 2 - Day 4: etoposide (ending on 12/23)  - start neupogen 24 hours after completion of chemotherapy  - started on allopurinol  JAVIER: Cr downtrending,  IVF at 75 cc/hr for now given EF of 35% and closely monitor respiratory status. 68M w/ h/o asthma, htn, DM (diet controlled) and recently diagnosed left sided tonsillar mass s/p non-diagnostic tonsil biopsy in the office 11/13/18 who presented to the ED with SOB s/p tracheostomy and pathology now confirming DLBCL GCB subtype. FISH negative for BCL6, MYC, MYC/IGH and IGH/BLC2 translocation. Pt also had an excisional LN bx on 12/15. CT C/A/P with indeterminate adrenal nodules only, hep panel negative, TTE with EF of 35%  - s/p BM biopsy on 12/19/18  - Treatment plan is for R-CEOP regimen  - Day 1 (12/20): Rituxan and prednisone 100 mg daily x 5 days  - Day 2 (12/21): cytoxan and vincristine  - Day 2 - Day 4: etoposide (ending on 12/23)  - start neupogen 24 hours after completion of chemotherapy  - started on allopurinol  JAVIER: Cr downtrending,  IVF at 75 cc/hr for now given EF of 35% and closely monitor respiratory status.    Dispo- Stable for discharge home. 68M w/ h/o asthma, htn, DM (diet controlled) and recently diagnosed left sided tonsillar mass s/p non-diagnostic tonsil biopsy in the office 11/13/18 who presented to the ED with SOB s/p tracheostomy and pathology now confirming DLBCL GCB subtype. FISH negative for BCL6, MYC, MYC/IGH and IGH/BLC2 translocation. Pt also had an excisional LN bx on 12/15. CT C/A/P with indeterminate adrenal nodules only, hep panel negative, TTE with EF of 35%  - s/p BM biopsy on 12/19/18  - Treatment plan is for R-CEOP regimen  - Day 1 (12/20): Rituxan and prednisone 100 mg daily x 5 days  - Day 2 (12/21): cytoxan and vincristine  - Day 2 - Day 4: etoposide (ending on 12/23)  - start neupogen 24 hours after completion of chemotherapy  - started on allopurinol  JAVIER: Cr downtrending,  IVF at 75 cc/hr for now given EF of 35% and closely monitor respiratory status.    Dispo- Stable for discharge home.   started on coreg for low EF  hgb low 7 on day of dc- insisted on leaving 12/31- to get 1 unit prbc and go home   will get outpt f/u w/ heme and ENT  given number for outpt cardiologist to follow up

## 2018-12-06 NOTE — SWALLOW BEDSIDE ASSESSMENT ADULT - COMMENTS
Patient is a 68 year old male with L tonsillar mass who presented to the ED with SOB with inability to tolerate his secretions s/p DLB and emergent tracheotomy, doing well post-op.    Patient was received awake, alert and cooperative for an initial bedside swallow evaluation utilizing green dye screening this AM. NGT sutured in R nare. #6 LPC trach with deflated cuff status in place. Patient breathing comfortably on trach collar. Of note, patient exhibited open mouth posture at rest with appearance of large tongue in forward, flaccid position. As per primary RN, patient was suctioned 10 minutes prior to this assessment. Aphonia noted upon clinician finger occlusion over trach site. Recommendations discussed with primary RN and ENT JOSE Hale.

## 2018-12-06 NOTE — PROGRESS NOTE ADULT - SUBJECTIVE AND OBJECTIVE BOX
Pt seen and examined this AM. No acute events overnight. Comfortable on floor.  Tolerating tube feeds, advanced to bolus this AM     AVSS  NAD, awake and alert   NC: NGT in R nare sutured in place  OC/OP: tongue large protruding out of OC, no bleeding  Neck: 6LPC in place secured with trach tie and sutures x2, cuff deflated, large amount of thick secretions mixed with blood suctioned, 6cm conglomerate of left level II/III neck nodes      A/P  68M w/ L tonsillar mass who presented to the ED with SOB with inability to tolerate his secretions s/p DLB, trach doing well post-op.  -bolus feeds today  -nutrition consulted  -G tube tomorrow with CT surgery   -NPO/IVF   -pain control prn  -home meds  -will need SLP evaluation prior to any PO intake  -routine trach care, trach teaching  -DM control -FSG < 130, ISS  -will follow-up with his MO/RO team upon discharge  -f/u path  -scds; sq held this morning in anticipation for possible PEG  -PT/OT once on the floor

## 2018-12-06 NOTE — DISCHARGE NOTE ADULT - CARE PLAN
Principal Discharge DX:	Airway obstruction, anatomic  Goal:	resolved  Assessment and plan of treatment:	s/p tracheostomy  Secondary Diagnosis:	Tonsil cancer  Goal:	further follow up  Assessment and plan of treatment:	s/p tracheostomy and pathology now confirming DLBCL GCB subtype. FISH negative for BCL6, MYC, MYC/IGH and IGH/BLC2 translocation. Pt also had an excisional LN bx on 12/15.   CT C/A/P with indeterminate adrenal nodules only  You have received chemotherapy:    Day 1 (12/20): Rituxan and prednisone 100 mg daily x 5 days  Day 2 (12/21): cytoxan and vincristine  - Day 2 - Day 4: etoposide (last on 12/23)  Secondary Diagnosis:	JAVIER (acute kidney injury)  Goal:	stable renal function  Assessment and plan of treatment:	Avoid nephrotoxic medications such as: Advil, Aleve, Excedrin  Secondary Diagnosis:	HTN (hypertension)  Goal:	BP control  Assessment and plan of treatment:	Echocardiagram showed EF of 35% Principal Discharge DX:	Airway obstruction, anatomic  Goal:	resolved  Assessment and plan of treatment:	s/p tracheostomy.  You can eat a puree diet.  Secondary Diagnosis:	Tonsil cancer  Goal:	further follow up  Assessment and plan of treatment:	s/p tracheostomy and pathology now confirming DLBCL GCB subtype. FISH negative for BCL6, MYC, MYC/IGH and IGH/BLC2 translocation. Pt also had an excisional LN bx on 12/15.   CT C/A/P with indeterminate adrenal nodules only  You have received chemotherapy:    Day 1 (12/20): Rituxan and prednisone 100 mg daily x 5 days  Day 2 (12/21): cytoxan and vincristine  - Day 2 - Day 4: etoposide (last on 12/23)  Secondary Diagnosis:	JAVIER (acute kidney injury)  Goal:	stable renal function  Assessment and plan of treatment:	Avoid nephrotoxic medications such as: Advil, Aleve, Excedrin  Secondary Diagnosis:	HTN (hypertension)  Goal:	BP control  Assessment and plan of treatment:	Echocardiagram showed EF of 35% Principal Discharge DX:	Airway obstruction, anatomic  Goal:	resolved  Assessment and plan of treatment:	s/p tracheostomy.  please see Dr. Billy for trach decannulation.    You can eat a puree diet.  Secondary Diagnosis:	Tonsil cancer  Goal:	further follow up  Assessment and plan of treatment:	s/p tracheostomy and pathology now confirming DLBCL GCB subtype. FISH negative for BCL6, MYC, MYC/IGH and IGH/BLC2 translocation.   You have received chemotherapy:    Day 1 (12/20): Rituxan and prednisone 100 mg daily x 5 days  Day 2 (12/21): cytoxan and vincristine  - Day 2 - Day 4: etoposide (last on 12/23)  YOU HAVE AN APPOINTMENT WITH DR. VON GUILLEN ON 1/2/19 AT 13:40  Secondary Diagnosis:	JAVIER (acute kidney injury)  Goal:	stable renal function  Assessment and plan of treatment:	Avoid nephrotoxic medications such as: Advil, Aleve, Excedrin  Secondary Diagnosis:	HTN (hypertension)  Goal:	BP control  Assessment and plan of treatment:	Echocardiagram showed EF of 35% that's why we started you on Coreg  Please make an appt to see Dr. Aj Campos as outpatient for the heart.

## 2018-12-06 NOTE — DISCHARGE NOTE ADULT - CARE PROVIDER_API CALL
Lizette Hernandez), HematologyOncology; Internal Medicine  [address] [place] [address]  Phone: [phone]  Fax: [phone]    Marc Billy), Otolaryngology  [address] [place] [address]  Phone: [phone]  Fax: [phone]    Aj Campos; PhD), Cardiology; Internal Medicine; Vascular Medicine  [address] [place] [address] [address] [place] [address]  Phone: [phone]  Fax: [phone]

## 2018-12-06 NOTE — DISCHARGE NOTE ADULT - CARE PROVIDERS DIRECT ADDRESSES
[email or web address]

## 2018-12-06 NOTE — SWALLOW BEDSIDE ASSESSMENT ADULT - ORAL PREPARATORY PHASE
Trace-Mild anterior loss; Decreased bolus collection; Mild pooling in anterior/lateral sulci; Decreased bolus collection Trace to Mild anterior spillage; Decreased bolus collection; Mild pooling in anterior/lateral sulci

## 2018-12-06 NOTE — DISCHARGE NOTE ADULT - PLAN OF CARE
resolved s/p tracheostomy further follow up s/p tracheostomy and pathology now confirming DLBCL GCB subtype. FISH negative for BCL6, MYC, MYC/IGH and IGH/BLC2 translocation. Pt also had an excisional LN bx on 12/15.   CT C/A/P with indeterminate adrenal nodules only  You have received chemotherapy:    Day 1 (12/20): Rituxan and prednisone 100 mg daily x 5 days  Day 2 (12/21): cytoxan and vincristine  - Day 2 - Day 4: etoposide (last on 12/23) stable renal function Avoid nephrotoxic medications such as: Advil, Aleve, Excedrin BP control Echocardiagram showed EF of 35% s/p tracheostomy.  You can eat a puree diet. s/p tracheostomy.  please see Dr. Billy for trach decannulation.    You can eat a puree diet. s/p tracheostomy and pathology now confirming DLBCL GCB subtype. FISH negative for BCL6, MYC, MYC/IGH and IGH/BLC2 translocation.   You have received chemotherapy:    Day 1 (12/20): Rituxan and prednisone 100 mg daily x 5 days  Day 2 (12/21): cytoxan and vincristine  - Day 2 - Day 4: etoposide (last on 12/23)  YOU HAVE AN APPOINTMENT WITH DR. VON GUILLEN ON 1/2/19 AT 13:40 Echocardiagram showed EF of 35% that's why we started you on Coreg  Please make an appt to see Dr. Aj Campos as outpatient for the heart.

## 2018-12-06 NOTE — DISCHARGE NOTE ADULT - MEDICATION SUMMARY - MEDICATIONS TO STOP TAKING
I will STOP taking the medications listed below when I get home from the hospital:    losartan 50 mg oral tablet  -- 1 tab(s) by mouth once a day    hydroCHLOROthiazide 12.5 mg oral capsule  -- 1 cap(s) by mouth once a day    ibuprofen 800 mg oral tablet  -- 2 tab(s) by mouth 3 times a day, As Needed  last dose 11/29    aspirin 81 mg oral tablet  -- 1 tab(s) by mouth once a day  last dose 11/29

## 2018-12-06 NOTE — DISCHARGE NOTE ADULT - DURABLE MEDICAL EQUIPMENT AGENCY
[facility] [employer] ( [phone] is the company providing your respiratory supplies.

## 2018-12-06 NOTE — DISCHARGE NOTE ADULT - INSTRUCTIONS
diet: peg feeds Watch for signs of infection; redness, swelling, fever, chills or heat, report such symptoms to the MD. No driving while taking pain medication, it causes drowsiness & constipation. Drink 6-8 glasses of fluids daily to promote hydration. No heavy lifting, pulling or pushing heavy objects. Follow up with the MD

## 2018-12-06 NOTE — SWALLOW BEDSIDE ASSESSMENT ADULT - SWALLOW EVAL: DIAGNOSIS
Patient was presented with PO trials of puree and honey-thick liquids impregnated in green dye this AM (3cc x 3 trials each via teaspoon). The oral stage was characterized by trace to mild anterior spillage exacerbated by anterior lingual "thrusting", decreased bolus collection with mild pooling in anterior/lateral sulci, and delayed anterior-posterior transfer. The pharyngeal stage was characterized by a suspected delay in pharyngeal trigger with reduced hyolaryngeal elevation upon palpation. No immediate, over s/s of impaired airway protection noted after puree trials and intratracheal suctioning yielded no evidence of green tinged secretions. However after honey-thick liquid trials, patient exhibited coughing accompanied by expectoration green tinged secretions through trach site indicating aspiration. Given above clinical presentation, oral nutrition/hydration/medication deemed contraindicated at this time. Patient was presented with PO trials of puree and honey-thick liquids impregnated in green dye this AM (3cc x 3 trials each via teaspoon). The oral stage was characterized by trace to mild anterior spillage exacerbated by anterior lingual "thrusting", decreased bolus collection, mild pooling in anterior/lateral sulci, and delayed anterior-posterior transfer. The pharyngeal stage was characterized by a suspected delay in pharyngeal trigger, reduced hyolaryngeal elevation upon palpation and multiple swallows suggestive of pharyngeal stasis. No immediate, overt s/s of impaired airway protection noted after puree trials and intratracheal suctioning yielded no evidence of green tinged secretions. However after honey-thick liquid trials, patient exhibited coughing accompanied by significant expectoration of green tinged secretions through trach site indicating aspiration. Given above clinical presentation, oral nutrition/hydration/medication deemed contraindicated at this time.

## 2018-12-07 LAB
APPEARANCE UR: CLEAR — SIGNIFICANT CHANGE UP
APTT BLD: 27 SEC — LOW (ref 27.5–36.3)
BACTERIA # UR AUTO: NEGATIVE — SIGNIFICANT CHANGE UP
BILIRUB UR-MCNC: NEGATIVE — SIGNIFICANT CHANGE UP
BLD GP AB SCN SERPL QL: NEGATIVE — SIGNIFICANT CHANGE UP
BLOOD UR QL VISUAL: NEGATIVE — SIGNIFICANT CHANGE UP
BUN SERPL-MCNC: 19 MG/DL — SIGNIFICANT CHANGE UP (ref 7–23)
CALCIUM SERPL-MCNC: 10 MG/DL — SIGNIFICANT CHANGE UP (ref 8.4–10.5)
CHLORIDE SERPL-SCNC: 97 MMOL/L — LOW (ref 98–107)
CO2 SERPL-SCNC: 30 MMOL/L — SIGNIFICANT CHANGE UP (ref 22–31)
COLOR SPEC: YELLOW — SIGNIFICANT CHANGE UP
CREAT SERPL-MCNC: 0.87 MG/DL — SIGNIFICANT CHANGE UP (ref 0.5–1.3)
GLUCOSE BLDC GLUCOMTR-MCNC: 134 MG/DL — HIGH (ref 70–99)
GLUCOSE BLDC GLUCOMTR-MCNC: 134 MG/DL — HIGH (ref 70–99)
GLUCOSE BLDC GLUCOMTR-MCNC: 143 MG/DL — HIGH (ref 70–99)
GLUCOSE BLDC GLUCOMTR-MCNC: 154 MG/DL — HIGH (ref 70–99)
GLUCOSE SERPL-MCNC: 120 MG/DL — HIGH (ref 70–99)
GLUCOSE UR-MCNC: NEGATIVE — SIGNIFICANT CHANGE UP
GRAM STN SPT: SIGNIFICANT CHANGE UP
HCT VFR BLD CALC: 42.3 % — SIGNIFICANT CHANGE UP (ref 39–50)
HGB BLD-MCNC: 14.6 G/DL — SIGNIFICANT CHANGE UP (ref 13–17)
HYALINE CASTS # UR AUTO: NEGATIVE — SIGNIFICANT CHANGE UP
INR BLD: 1.09 — SIGNIFICANT CHANGE UP (ref 0.88–1.17)
KETONES UR-MCNC: SIGNIFICANT CHANGE UP
LEUKOCYTE ESTERASE UR-ACNC: NEGATIVE — SIGNIFICANT CHANGE UP
MAGNESIUM SERPL-MCNC: 1.9 MG/DL — SIGNIFICANT CHANGE UP (ref 1.6–2.6)
MCHC RBC-ENTMCNC: 33.9 PG — SIGNIFICANT CHANGE UP (ref 27–34)
MCHC RBC-ENTMCNC: 34.5 % — SIGNIFICANT CHANGE UP (ref 32–36)
MCV RBC AUTO: 98.1 FL — SIGNIFICANT CHANGE UP (ref 80–100)
NITRITE UR-MCNC: NEGATIVE — SIGNIFICANT CHANGE UP
NRBC # FLD: 0 — SIGNIFICANT CHANGE UP
PH UR: 6.5 — SIGNIFICANT CHANGE UP (ref 5–8)
PHOSPHATE SERPL-MCNC: 2.3 MG/DL — LOW (ref 2.5–4.5)
PLATELET # BLD AUTO: 179 K/UL — SIGNIFICANT CHANGE UP (ref 150–400)
PMV BLD: 11.2 FL — SIGNIFICANT CHANGE UP (ref 7–13)
POTASSIUM SERPL-MCNC: 3.3 MMOL/L — LOW (ref 3.5–5.3)
POTASSIUM SERPL-SCNC: 3.3 MMOL/L — LOW (ref 3.5–5.3)
PROT UR-MCNC: 30 — SIGNIFICANT CHANGE UP
PROTHROM AB SERPL-ACNC: 12.5 SEC — SIGNIFICANT CHANGE UP (ref 9.8–13.1)
RBC # BLD: 4.31 M/UL — SIGNIFICANT CHANGE UP (ref 4.2–5.8)
RBC # FLD: 12.1 % — SIGNIFICANT CHANGE UP (ref 10.3–14.5)
RBC CASTS # UR COMP ASSIST: SIGNIFICANT CHANGE UP (ref 0–?)
RH IG SCN BLD-IMP: POSITIVE — SIGNIFICANT CHANGE UP
SODIUM SERPL-SCNC: 143 MMOL/L — SIGNIFICANT CHANGE UP (ref 135–145)
SP GR SPEC: 1.02 — SIGNIFICANT CHANGE UP (ref 1–1.04)
SPECIMEN SOURCE: SIGNIFICANT CHANGE UP
SQUAMOUS # UR AUTO: SIGNIFICANT CHANGE UP
SURGICAL PATHOLOGY STUDY: SIGNIFICANT CHANGE UP
UROBILINOGEN FLD QL: SIGNIFICANT CHANGE UP
WBC # BLD: 8.83 K/UL — SIGNIFICANT CHANGE UP (ref 3.8–10.5)
WBC # FLD AUTO: 8.83 K/UL — SIGNIFICANT CHANGE UP (ref 3.8–10.5)
WBC UR QL: SIGNIFICANT CHANGE UP (ref 0–?)

## 2018-12-07 PROCEDURE — 71045 X-RAY EXAM CHEST 1 VIEW: CPT | Mod: 26

## 2018-12-07 PROCEDURE — 43246 EGD PLACE GASTROSTOMY TUBE: CPT

## 2018-12-07 RX ORDER — MORPHINE SULFATE 50 MG/1
4 CAPSULE, EXTENDED RELEASE ORAL EVERY 4 HOURS
Qty: 0 | Refills: 0 | Status: DISCONTINUED | OUTPATIENT
Start: 2018-12-07 | End: 2018-12-09

## 2018-12-07 RX ORDER — HYDROMORPHONE HYDROCHLORIDE 2 MG/ML
0.5 INJECTION INTRAMUSCULAR; INTRAVENOUS; SUBCUTANEOUS EVERY 4 HOURS
Qty: 0 | Refills: 0 | Status: DISCONTINUED | OUTPATIENT
Start: 2018-12-07 | End: 2018-12-07

## 2018-12-07 RX ORDER — HYDRALAZINE HCL 50 MG
10 TABLET ORAL ONCE
Qty: 0 | Refills: 0 | Status: DISCONTINUED | OUTPATIENT
Start: 2018-12-07 | End: 2018-12-07

## 2018-12-07 RX ORDER — POTASSIUM CHLORIDE 20 MEQ
10 PACKET (EA) ORAL
Qty: 0 | Refills: 0 | Status: DISCONTINUED | OUTPATIENT
Start: 2018-12-07 | End: 2018-12-07

## 2018-12-07 RX ORDER — HYDROMORPHONE HYDROCHLORIDE 2 MG/ML
1 INJECTION INTRAMUSCULAR; INTRAVENOUS; SUBCUTANEOUS EVERY 4 HOURS
Qty: 0 | Refills: 0 | Status: DISCONTINUED | OUTPATIENT
Start: 2018-12-07 | End: 2018-12-07

## 2018-12-07 RX ORDER — POTASSIUM PHOSPHATE, MONOBASIC POTASSIUM PHOSPHATE, DIBASIC 236; 224 MG/ML; MG/ML
15 INJECTION, SOLUTION INTRAVENOUS ONCE
Qty: 0 | Refills: 0 | Status: COMPLETED | OUTPATIENT
Start: 2018-12-07 | End: 2018-12-07

## 2018-12-07 RX ORDER — AMPICILLIN SODIUM AND SULBACTAM SODIUM 250; 125 MG/ML; MG/ML
INJECTION, POWDER, FOR SUSPENSION INTRAMUSCULAR; INTRAVENOUS
Qty: 0 | Refills: 0 | Status: DISCONTINUED | OUTPATIENT
Start: 2018-12-07 | End: 2018-12-07

## 2018-12-07 RX ORDER — MORPHINE SULFATE 50 MG/1
2 CAPSULE, EXTENDED RELEASE ORAL EVERY 4 HOURS
Qty: 0 | Refills: 0 | Status: DISCONTINUED | OUTPATIENT
Start: 2018-12-07 | End: 2018-12-09

## 2018-12-07 RX ORDER — SODIUM CHLORIDE 9 MG/ML
1000 INJECTION, SOLUTION INTRAVENOUS
Qty: 0 | Refills: 0 | Status: DISCONTINUED | OUTPATIENT
Start: 2018-12-07 | End: 2018-12-09

## 2018-12-07 RX ADMIN — OXYCODONE HYDROCHLORIDE 5 MILLIGRAM(S): 5 TABLET ORAL at 05:22

## 2018-12-07 RX ADMIN — MORPHINE SULFATE 4 MILLIGRAM(S): 50 CAPSULE, EXTENDED RELEASE ORAL at 21:45

## 2018-12-07 RX ADMIN — Medication 100 MILLIEQUIVALENT(S): at 11:00

## 2018-12-07 RX ADMIN — MORPHINE SULFATE 4 MILLIGRAM(S): 50 CAPSULE, EXTENDED RELEASE ORAL at 21:32

## 2018-12-07 RX ADMIN — Medication 100 MILLIGRAM(S): at 05:23

## 2018-12-07 RX ADMIN — HEPARIN SODIUM 5000 UNIT(S): 5000 INJECTION INTRAVENOUS; SUBCUTANEOUS at 17:32

## 2018-12-07 RX ADMIN — LOSARTAN POTASSIUM 50 MILLIGRAM(S): 100 TABLET, FILM COATED ORAL at 05:22

## 2018-12-07 RX ADMIN — Medication 12.5 MILLIGRAM(S): at 05:22

## 2018-12-07 RX ADMIN — OXYCODONE HYDROCHLORIDE 5 MILLIGRAM(S): 5 TABLET ORAL at 06:00

## 2018-12-07 RX ADMIN — POTASSIUM PHOSPHATE, MONOBASIC POTASSIUM PHOSPHATE, DIBASIC 62.5 MILLIMOLE(S): 236; 224 INJECTION, SOLUTION INTRAVENOUS at 15:12

## 2018-12-07 NOTE — BRIEF OPERATIVE NOTE - OPERATION/FINDINGS
1) 6LPC trach placed into trachea, position confirmed with end tidal CO2  2) large L tonsil tumor invading the base of tongue crossing the midline and also invading the posterior pharyngeal wall biopsied with tissue sent for both permanent and fresh for lymphoma workup
tonsillar cancer

## 2018-12-07 NOTE — PROGRESS NOTE ADULT - SUBJECTIVE AND OBJECTIVE BOX
Pt seen and examined this AM. [place] failed SLP eval. Overnight hypertensive to 180s requiring hydralazine x1, later became febrile to 101.8 and tachycardic to 110s. Fever workup initiated.     NAD, awake and alert   NC: NGT in R nare sutured in place  OC/OP: tongue large protruding out of OC, no bleeding  Neck: 6LPC in place secured with trach tie and sutures x2, cuff deflated, copious secretions suctioned, 6cm conglomerate of left level II/III neck nodes    A/P  68M w/ L tonsillar mass who presented to the ED with SOB with inability to tolerate his secretions s/p DLB, trach now with fever.  -fu fever w/u -CXR, Ua, rcx, bx  -NPO/IVF for Gtube w/ CTS today, bolus feeds held  -strict NPO by mouth  -pain control prn  -home meds  -routine trach care, trach teaching  -DM control -FSG, ISS  -will follow-up with his MO/RO team upon discharge  -f/u path  -scds; sq held this morning in anticipation for possible PEG  -PT/OT once on the floor

## 2018-12-07 NOTE — BRIEF OPERATIVE NOTE - PRE-OP DX
Acute on chronic respiratory failure, unspecified whether with hypoxia or hypercapnia  12/05/2018    Active  Yudelka Be
Acute on chronic respiratory failure, unspecified whether with hypoxia or hypercapnia  12/05/2018    Active  Yudelka Be  Tonsillar cancer  12/07/2018  inanition  Active  Mansi Alvarado

## 2018-12-07 NOTE — CHART NOTE - NSCHARTNOTEFT_GEN_A_CORE
Patient is a 68y old  Male who presents with a chief complaint of respiratory failure (07 Dec 2018 08:38). Pt is s/p EDG, PED on 12/7/18      Vital Signs Last 24 Hrs  T(C): 37.3 (07 Dec 2018 23:05), Max: 38.8 (07 Dec 2018 05:17)  T(F): 99.1 (07 Dec 2018 23:05), Max: 101.8 (07 Dec 2018 05:17)  HR: 77 (07 Dec 2018 23:05) (54 - 114)  BP: 140/87 (07 Dec 2018 23:05) (132/92 - 174/95)  BP(mean): 102 (07 Dec 2018 14:10) (102 - 102)  RR: 18 (07 Dec 2018 23:05) (15 - 22)  SpO2: 93% (07 Dec 2018 23:05) (93% - 99%)    General: WN/WD NAD  Neurology: A&Ox3, nonfocal, SHERIDAN x 4  Respiratory: CTA B/L  CV: RRR, S1S2, no murmurs, rubs or gallops  Abdominal: Soft, NT, ND +BS, Last BM  Extremities: No edema, + peripheral pulses  Incisions: c,d,i  Tubes: Trach in place. PEG to Tran bag                          14.6   8.83  )-----------( 179      ( 07 Dec 2018 05:45 )             42.3     12-07    143  |  97<L>  |  19  ----------------------------<  120<H>  3.3<L>   |  30  |  0.87    Ca    10.0      07 Dec 2018 05:45  Phos  2.3     12-07  Mg     1.9     12-07      PT/INR - ( 07 Dec 2018 05:45 )   PT: 12.5 SEC;   INR: 1.09          PTT - ( 07 Dec 2018 05:45 )  PTT:27.0 SEC    CXR    A/P  -Keep PEG to [place] until 3PM 12/8/18. Can use after  - Will loosen PEG on 12/9/18

## 2018-12-08 LAB
BUN SERPL-MCNC: 14 MG/DL — SIGNIFICANT CHANGE UP (ref 7–23)
BUN SERPL-MCNC: 14 MG/DL — SIGNIFICANT CHANGE UP (ref 7–23)
CALCIUM SERPL-MCNC: 9.3 MG/DL — SIGNIFICANT CHANGE UP (ref 8.4–10.5)
CALCIUM SERPL-MCNC: 9.4 MG/DL — SIGNIFICANT CHANGE UP (ref 8.4–10.5)
CHLORIDE SERPL-SCNC: 100 MMOL/L — SIGNIFICANT CHANGE UP (ref 98–107)
CHLORIDE SERPL-SCNC: 98 MMOL/L — SIGNIFICANT CHANGE UP (ref 98–107)
CO2 SERPL-SCNC: 29 MMOL/L — SIGNIFICANT CHANGE UP (ref 22–31)
CO2 SERPL-SCNC: 31 MMOL/L — SIGNIFICANT CHANGE UP (ref 22–31)
CREAT SERPL-MCNC: 0.86 MG/DL — SIGNIFICANT CHANGE UP (ref 0.5–1.3)
CREAT SERPL-MCNC: 0.88 MG/DL — SIGNIFICANT CHANGE UP (ref 0.5–1.3)
GLUCOSE BLDC GLUCOMTR-MCNC: 135 MG/DL — HIGH (ref 70–99)
GLUCOSE BLDC GLUCOMTR-MCNC: 139 MG/DL — HIGH (ref 70–99)
GLUCOSE BLDC GLUCOMTR-MCNC: 142 MG/DL — HIGH (ref 70–99)
GLUCOSE BLDC GLUCOMTR-MCNC: 149 MG/DL — HIGH (ref 70–99)
GLUCOSE SERPL-MCNC: 135 MG/DL — HIGH (ref 70–99)
GLUCOSE SERPL-MCNC: 155 MG/DL — HIGH (ref 70–99)
HCT VFR BLD CALC: 38.9 % — LOW (ref 39–50)
HGB BLD-MCNC: 13.3 G/DL — SIGNIFICANT CHANGE UP (ref 13–17)
MAGNESIUM SERPL-MCNC: 1.7 MG/DL — SIGNIFICANT CHANGE UP (ref 1.6–2.6)
MAGNESIUM SERPL-MCNC: 1.8 MG/DL — SIGNIFICANT CHANGE UP (ref 1.6–2.6)
MCHC RBC-ENTMCNC: 33.7 PG — SIGNIFICANT CHANGE UP (ref 27–34)
MCHC RBC-ENTMCNC: 34.2 % — SIGNIFICANT CHANGE UP (ref 32–36)
MCV RBC AUTO: 98.5 FL — SIGNIFICANT CHANGE UP (ref 80–100)
NRBC # FLD: 0 — SIGNIFICANT CHANGE UP
PHOSPHATE SERPL-MCNC: 2.8 MG/DL — SIGNIFICANT CHANGE UP (ref 2.5–4.5)
PHOSPHATE SERPL-MCNC: 2.8 MG/DL — SIGNIFICANT CHANGE UP (ref 2.5–4.5)
PLATELET # BLD AUTO: 151 K/UL — SIGNIFICANT CHANGE UP (ref 150–400)
PMV BLD: 11.7 FL — SIGNIFICANT CHANGE UP (ref 7–13)
POTASSIUM SERPL-MCNC: 2.9 MMOL/L — CRITICAL LOW (ref 3.5–5.3)
POTASSIUM SERPL-MCNC: 3.2 MMOL/L — LOW (ref 3.5–5.3)
POTASSIUM SERPL-SCNC: 2.9 MMOL/L — CRITICAL LOW (ref 3.5–5.3)
POTASSIUM SERPL-SCNC: 3.2 MMOL/L — LOW (ref 3.5–5.3)
RBC # BLD: 3.95 M/UL — LOW (ref 4.2–5.8)
RBC # FLD: 12.2 % — SIGNIFICANT CHANGE UP (ref 10.3–14.5)
SODIUM SERPL-SCNC: 141 MMOL/L — SIGNIFICANT CHANGE UP (ref 135–145)
SODIUM SERPL-SCNC: 142 MMOL/L — SIGNIFICANT CHANGE UP (ref 135–145)
SPECIMEN SOURCE: SIGNIFICANT CHANGE UP
WBC # BLD: 12.22 K/UL — HIGH (ref 3.8–10.5)
WBC # FLD AUTO: 12.22 K/UL — HIGH (ref 3.8–10.5)

## 2018-12-08 RX ORDER — INSULIN LISPRO 100/ML
VIAL (ML) SUBCUTANEOUS EVERY 6 HOURS
Qty: 0 | Refills: 0 | Status: DISCONTINUED | OUTPATIENT
Start: 2018-12-08 | End: 2018-12-28

## 2018-12-08 RX ORDER — HALOPERIDOL DECANOATE 100 MG/ML
2 INJECTION INTRAMUSCULAR ONCE
Qty: 0 | Refills: 0 | Status: COMPLETED | OUTPATIENT
Start: 2018-12-08 | End: 2018-12-08

## 2018-12-08 RX ORDER — POTASSIUM CHLORIDE 20 MEQ
20 PACKET (EA) ORAL ONCE
Qty: 0 | Refills: 0 | Status: COMPLETED | OUTPATIENT
Start: 2018-12-08 | End: 2018-12-08

## 2018-12-08 RX ORDER — MAGNESIUM OXIDE 400 MG ORAL TABLET 241.3 MG
800 TABLET ORAL ONCE
Qty: 0 | Refills: 0 | Status: COMPLETED | OUTPATIENT
Start: 2018-12-08 | End: 2018-12-08

## 2018-12-08 RX ORDER — POTASSIUM CHLORIDE 20 MEQ
10 PACKET (EA) ORAL
Qty: 0 | Refills: 0 | Status: COMPLETED | OUTPATIENT
Start: 2018-12-08 | End: 2018-12-08

## 2018-12-08 RX ADMIN — HEPARIN SODIUM 5000 UNIT(S): 5000 INJECTION INTRAVENOUS; SUBCUTANEOUS at 16:51

## 2018-12-08 RX ADMIN — Medication 20 MILLIEQUIVALENT(S): at 18:47

## 2018-12-08 RX ADMIN — Medication 100 MILLIEQUIVALENT(S): at 20:38

## 2018-12-08 RX ADMIN — Medication 100 MILLIEQUIVALENT(S): at 16:50

## 2018-12-08 RX ADMIN — HEPARIN SODIUM 5000 UNIT(S): 5000 INJECTION INTRAVENOUS; SUBCUTANEOUS at 01:27

## 2018-12-08 RX ADMIN — MAGNESIUM OXIDE 400 MG ORAL TABLET 800 MILLIGRAM(S): 241.3 TABLET ORAL at 13:48

## 2018-12-08 RX ADMIN — Medication 100 MILLIEQUIVALENT(S): at 18:00

## 2018-12-08 RX ADMIN — OXYCODONE HYDROCHLORIDE 5 MILLIGRAM(S): 5 TABLET ORAL at 20:21

## 2018-12-08 RX ADMIN — Medication 650 MILLIGRAM(S): at 20:21

## 2018-12-08 RX ADMIN — SODIUM CHLORIDE 100 MILLILITER(S): 9 INJECTION, SOLUTION INTRAVENOUS at 16:50

## 2018-12-08 RX ADMIN — OXYCODONE HYDROCHLORIDE 5 MILLIGRAM(S): 5 TABLET ORAL at 18:46

## 2018-12-08 RX ADMIN — Medication 650 MILLIGRAM(S): at 18:47

## 2018-12-08 RX ADMIN — SENNA PLUS 10 MILLILITER(S): 8.6 TABLET ORAL at 23:51

## 2018-12-08 RX ADMIN — Medication 100 MILLIGRAM(S): at 18:48

## 2018-12-08 RX ADMIN — Medication 81 MILLIGRAM(S): at 13:49

## 2018-12-08 RX ADMIN — Medication 2 MILLIGRAM(S): at 05:13

## 2018-12-08 RX ADMIN — SODIUM CHLORIDE 100 MILLILITER(S): 9 INJECTION, SOLUTION INTRAVENOUS at 13:49

## 2018-12-08 RX ADMIN — HALOPERIDOL DECANOATE 2 MILLIGRAM(S): 100 INJECTION INTRAMUSCULAR at 01:27

## 2018-12-08 NOTE — PHYSICAL THERAPY INITIAL EVALUATION ADULT - GENERAL OBSERVATIONS, REHAB EVAL
Patient found semi reclined in bed in NAD; +trach 28% 02; +pastor; unable to speak; communicates through writing.

## 2018-12-08 NOTE — PHYSICAL THERAPY INITIAL EVALUATION ADULT - GAIT DISTANCE, PT EVAL
50 feet as per KOKI Quinonez, patient able to ambulate off 02 in hallway; 02 sat 98% after ambulating on RA/50 feet

## 2018-12-08 NOTE — PROGRESS NOTE ADULT - SUBJECTIVE AND OBJECTIVE BOX
Pt seen and examined.   NAD, awake and alert   NC: NGT in R nare sutured in place  OC/OP: tongue large protruding out of OC, no bleeding  Neck: 6LPC in place secured with trach tie and sutures x2, cuff deflated, copious secretions suctioned, 6cm conglomerate of left level II/III neck nodes    A/P  68M w/ L tonsillar mass who presented to the ED with SOB with inability to tolerate his secretions s/p DLB, trach now with fever.  -fu fever w/u -CXR, Ua, rcx, bx  -NPO/IVF for Gtube w/ CTS today, bolus feeds held  -strict NPO by mouth  -pain control prn  -home meds  -routine trach care, trach teaching  -DM control -FSG, ISS  -will follow-up with his MO/RO team upon discharge  -f/u path  -scds; [facility] held this morning in anticipation for possible PEG  -PT/OT once on the floor Pt seen and examined. became agitated overnight, pulled out IV x 2. was given haldol 2mg and ativan 2mg iv.     NAD, awake and alert   OC/OP: tongue large protruding out of OC, no bleeding  Neck: 6LPC in place secured with trach tie and sutures x2, cuff deflated, copious secretions suctioned, 6cm conglomerate of left level II/III neck nodes  abdomen: peg in place     A/P  68M w/ L tonsillar mass who presented to the ED with SOB with inability to tolerate his secretions s/p DLB, trach now with fever.  -fu fever w/u: rcx gpc  -trickle feeds and meds through PEG today at 130pm  -strict NPO by mouth  -pain control prn  -home meds  -routine trach care, trach teaching  -peg teaching  -DM control -FSG, ISS  -will follow-up with his MO/RO team upon discharge  -f/u path  -sqh   -PT/OT

## 2018-12-08 NOTE — PROGRESS NOTE ADULT - SUBJECTIVE AND OBJECTIVE BOX
ANESTHESIA POSTOP CHECK    68y Male POSTOP DAY 1 S/P EGD/PEG    Vital Signs Last 24 Hrs  T(C): 36.8 (08 Dec 2018 05:00), Max: 37.6 (08 Dec 2018 02:00)  T(F): 98.2 (08 Dec 2018 05:00), Max: 99.6 (08 Dec 2018 02:00)  HR: 60 (08 Dec 2018 05:00) (57 - 99)  BP: 147/88 (08 Dec 2018 05:00) (132/92 - 159/89)  BP(mean): 102 (07 Dec 2018 14:10) (102 - 102)  RR: 18 (08 Dec 2018 05:00) (15 - 22)  SpO2: 95% (08 Dec 2018 05:00) (93% - 100%)  I&O's Summary    07 Dec 2018 07:01  -  08 Dec 2018 07:00  --------------------------------------------------------  IN: 1000 mL / OUT: 750 mL / NET: 250 mL        [X] NO APPARENT ANESTHESIA COMPLICATIONS

## 2018-12-08 NOTE — PHYSICAL THERAPY INITIAL EVALUATION ADULT - ADDITIONAL COMMENTS
+ stairs to negotiate at home with railings + stairs to negotiate at home with railings; patient doesn't own any medical equipment.

## 2018-12-08 NOTE — DIETITIAN INITIAL EVALUATION ADULT. - NS AS NUTRI INTERV ENTERAL NUTRITION
Concentration/Rate/Volume/Recommend to increase bolus feeds to 350  ml of Glucerna 1.2 every 4 hours to provide total volume  of 1400 ml x 24 hours.   Formula will provide 1680 kcal and 84 grams of protein./Composition

## 2018-12-08 NOTE — DIETITIAN INITIAL EVALUATION ADULT. - OTHER INFO
Nutrition consult ordered for tube feeding, 67 y/o male admitted with the DX of respiratory disorder, s/p tracheostomy, DM, HTN, as per  medical  charts initiate PEG  feeds, Pt NPO at his time. Labs reviewed, pt on 5% dextrose and potassium supplement. Nutrition consult ordered for tube feeding, 69 y/o male admitted with the DX of respiratory disorder, s/p tracheostomy, DM, HTN, failed  s/s on 12/6, s/p PEG receiving bolus feeds, Glucerna 1.2  1200 ml total volume, 300  ml every 6 hours. Formula will provide 1440 kcal and 72  grams of protein not meeting  nutritional needs. Recommend to increase bolus feeds to 350 ml of Glucerna 1.2 every 4 hours to provide total volume of 1400 ml x 24 hours.   Formula will provide 1680 kcal and 84 grams of protein.   Monitor feeding tolerance , labs and weight.  Advance feed as tolerated. RD remains available.

## 2018-12-08 NOTE — PHYSICAL THERAPY INITIAL EVALUATION ADULT - PERTINENT HX OF CURRENT PROBLEM, REHAB EVAL
68M w/ h/o asthma, htn, DM (diet controlled) and recently diagnosed left sided tonsillar mass s/p non-diagnostic tonsil biopsy in the office 11/13/18 who presented to the ED with SOB. At that time flexible laryngoscopy showed patent airway but inability to tolerate secretions with pooling of secretions in the hypopharynx and suspected aspiration.

## 2018-12-08 NOTE — PHYSICAL THERAPY INITIAL EVALUATION ADULT - PLANNED THERAPY INTERVENTIONS, PT EVAL
transfer training/balance training/bed mobility training/gait training/Patient left sitting in chair in NAD; call bell in reach; +trach; +pastor./strengthening

## 2018-12-09 LAB
-  CEFAZOLIN: SIGNIFICANT CHANGE UP
-  CIPROFLOXACIN: SIGNIFICANT CHANGE UP
-  CLINDAMYCIN: SIGNIFICANT CHANGE UP
-  ERYTHROMYCIN: SIGNIFICANT CHANGE UP
-  GENTAMICIN: SIGNIFICANT CHANGE UP
-  LEVOFLOXACIN: SIGNIFICANT CHANGE UP
-  LINEZOLID: SIGNIFICANT CHANGE UP
-  MOXIFLOXACIN(AEROBIC): SIGNIFICANT CHANGE UP
-  OXACILLIN: SIGNIFICANT CHANGE UP
-  PENICILLIN: SIGNIFICANT CHANGE UP
-  RIFAMPIN.: SIGNIFICANT CHANGE UP
-  TETRACYCLINE: SIGNIFICANT CHANGE UP
-  TRIMETHOPRIM/SULFAMETHOXAZOLE: SIGNIFICANT CHANGE UP
-  VANCOMYCIN: SIGNIFICANT CHANGE UP
BACTERIA SPT RESP CULT: SIGNIFICANT CHANGE UP
BUN SERPL-MCNC: 14 MG/DL — SIGNIFICANT CHANGE UP (ref 7–23)
CALCIUM SERPL-MCNC: 9.3 MG/DL — SIGNIFICANT CHANGE UP (ref 8.4–10.5)
CHLORIDE SERPL-SCNC: 102 MMOL/L — SIGNIFICANT CHANGE UP (ref 98–107)
CO2 SERPL-SCNC: 29 MMOL/L — SIGNIFICANT CHANGE UP (ref 22–31)
CREAT SERPL-MCNC: 0.85 MG/DL — SIGNIFICANT CHANGE UP (ref 0.5–1.3)
GLUCOSE BLDC GLUCOMTR-MCNC: 133 MG/DL — HIGH (ref 70–99)
GLUCOSE BLDC GLUCOMTR-MCNC: 135 MG/DL — HIGH (ref 70–99)
GLUCOSE BLDC GLUCOMTR-MCNC: 138 MG/DL — HIGH (ref 70–99)
GLUCOSE BLDC GLUCOMTR-MCNC: 141 MG/DL — HIGH (ref 70–99)
GLUCOSE SERPL-MCNC: 135 MG/DL — HIGH (ref 70–99)
GRAM STN SPT: SIGNIFICANT CHANGE UP
HCT VFR BLD CALC: 36.8 % — LOW (ref 39–50)
HGB BLD-MCNC: 12.6 G/DL — LOW (ref 13–17)
MAGNESIUM SERPL-MCNC: 1.8 MG/DL — SIGNIFICANT CHANGE UP (ref 1.6–2.6)
MCHC RBC-ENTMCNC: 34.1 PG — HIGH (ref 27–34)
MCHC RBC-ENTMCNC: 34.2 % — SIGNIFICANT CHANGE UP (ref 32–36)
MCV RBC AUTO: 99.7 FL — SIGNIFICANT CHANGE UP (ref 80–100)
METHOD TYPE: SIGNIFICANT CHANGE UP
NRBC # FLD: 0 — SIGNIFICANT CHANGE UP
ORGANISM # SPEC MICROSCOPIC CNT: SIGNIFICANT CHANGE UP
ORGANISM # SPEC MICROSCOPIC CNT: SIGNIFICANT CHANGE UP
PHOSPHATE SERPL-MCNC: 2 MG/DL — LOW (ref 2.5–4.5)
PLATELET # BLD AUTO: 134 K/UL — LOW (ref 150–400)
PMV BLD: 11.7 FL — SIGNIFICANT CHANGE UP (ref 7–13)
POTASSIUM SERPL-MCNC: 3.5 MMOL/L — SIGNIFICANT CHANGE UP (ref 3.5–5.3)
POTASSIUM SERPL-SCNC: 3.5 MMOL/L — SIGNIFICANT CHANGE UP (ref 3.5–5.3)
RBC # BLD: 3.69 M/UL — LOW (ref 4.2–5.8)
RBC # FLD: 12.3 % — SIGNIFICANT CHANGE UP (ref 10.3–14.5)
SODIUM SERPL-SCNC: 142 MMOL/L — SIGNIFICANT CHANGE UP (ref 135–145)
WBC # BLD: 9.73 K/UL — SIGNIFICANT CHANGE UP (ref 3.8–10.5)
WBC # FLD AUTO: 9.73 K/UL — SIGNIFICANT CHANGE UP (ref 3.8–10.5)

## 2018-12-09 RX ORDER — MAGNESIUM OXIDE 400 MG ORAL TABLET 241.3 MG
800 TABLET ORAL ONCE
Qty: 0 | Refills: 0 | Status: COMPLETED | OUTPATIENT
Start: 2018-12-09 | End: 2018-12-09

## 2018-12-09 RX ORDER — POTASSIUM PHOSPHATE, MONOBASIC POTASSIUM PHOSPHATE, DIBASIC 236; 224 MG/ML; MG/ML
15 INJECTION, SOLUTION INTRAVENOUS ONCE
Qty: 0 | Refills: 0 | Status: COMPLETED | OUTPATIENT
Start: 2018-12-09 | End: 2018-12-09

## 2018-12-09 RX ADMIN — POTASSIUM PHOSPHATE, MONOBASIC POTASSIUM PHOSPHATE, DIBASIC 62.5 MILLIMOLE(S): 236; 224 INJECTION, SOLUTION INTRAVENOUS at 11:24

## 2018-12-09 RX ADMIN — Medication 81 MILLIGRAM(S): at 11:25

## 2018-12-09 RX ADMIN — OXYCODONE HYDROCHLORIDE 5 MILLIGRAM(S): 5 TABLET ORAL at 21:30

## 2018-12-09 RX ADMIN — HEPARIN SODIUM 5000 UNIT(S): 5000 INJECTION INTRAVENOUS; SUBCUTANEOUS at 11:25

## 2018-12-09 RX ADMIN — OXYCODONE HYDROCHLORIDE 5 MILLIGRAM(S): 5 TABLET ORAL at 22:31

## 2018-12-09 RX ADMIN — Medication 12.5 MILLIGRAM(S): at 06:42

## 2018-12-09 RX ADMIN — HEPARIN SODIUM 5000 UNIT(S): 5000 INJECTION INTRAVENOUS; SUBCUTANEOUS at 00:59

## 2018-12-09 RX ADMIN — HEPARIN SODIUM 5000 UNIT(S): 5000 INJECTION INTRAVENOUS; SUBCUTANEOUS at 18:42

## 2018-12-09 RX ADMIN — MAGNESIUM OXIDE 400 MG ORAL TABLET 800 MILLIGRAM(S): 241.3 TABLET ORAL at 11:24

## 2018-12-09 RX ADMIN — Medication 100 MILLIGRAM(S): at 18:42

## 2018-12-09 RX ADMIN — MORPHINE SULFATE 4 MILLIGRAM(S): 50 CAPSULE, EXTENDED RELEASE ORAL at 12:56

## 2018-12-09 RX ADMIN — Medication 100 MILLIGRAM(S): at 06:43

## 2018-12-09 RX ADMIN — SENNA PLUS 10 MILLILITER(S): 8.6 TABLET ORAL at 21:30

## 2018-12-09 RX ADMIN — LOSARTAN POTASSIUM 50 MILLIGRAM(S): 100 TABLET, FILM COATED ORAL at 06:43

## 2018-12-09 RX ADMIN — MORPHINE SULFATE 4 MILLIGRAM(S): 50 CAPSULE, EXTENDED RELEASE ORAL at 13:05

## 2018-12-09 NOTE — OCCUPATIONAL THERAPY INITIAL EVALUATION ADULT - MD ORDER
Occupational Therapy (OT) to evaluate and treat. Occupational Therapy (OT) to evaluate and treat. Per RN  P., pt is okay to participate in OT evaluation and perform activity as tolerated.

## 2018-12-09 NOTE — OCCUPATIONAL THERAPY INITIAL EVALUATION ADULT - PATIENT/FAMILY/SIGNIFICANT OTHER GOALS STATEMENT, OT EVAL
Pt. unable to verbally formulate a goal. Pt communicating via writing (pen & paper). Pt reports he wants to "get better."

## 2018-12-09 NOTE — OCCUPATIONAL THERAPY INITIAL EVALUATION ADULT - DIAGNOSIS, OT EVAL
s/p fiberoptic intubation, tracheotomy, direct laryngoscopy, biopsy; s/p PEG; Decreased functional mobility; Decreased ADL management

## 2018-12-09 NOTE — OCCUPATIONAL THERAPY INITIAL EVALUATION ADULT - RANGE OF MOTION EXAMINATION, UPPER EXTREMITY
bilateral UE Active ROM was WFL  (within functional limits)/except Right Shoulder Flexion Active ROM 0-90 degrees

## 2018-12-09 NOTE — OCCUPATIONAL THERAPY INITIAL EVALUATION ADULT - NS ASR BATHING EQUIP NEEDS
Pt. educated on benefits and how to privately purchase if needed. Pt reports he owns shower chair already./grab bar

## 2018-12-09 NOTE — OCCUPATIONAL THERAPY INITIAL EVALUATION ADULT - ADDITIONAL COMMENTS
(See continued from above) At that time flexible laryngoscopy showed pt airway but inability to tolerate secretions with pooling of secretions in the hypopharynx and suspected aspiration. Awake nasal fiberoptic intubation was attempted by the anesthesia team in the ED however without success and the pt was then brought urgently to the OR for fiberoptic intubation, tracheotomy, direct laryngoscopy, biopsy on 12/5/18. Pt is s/p PEG on 12/7/18.

## 2018-12-09 NOTE — PROGRESS NOTE ADULT - SUBJECTIVE AND OBJECTIVE BOX
Pt seen and examined. No further agitation over past 24hrs.  Tolerating continuous tube feeds.     Vital Signs Last 24 Hrs  T(C): 36.7 (09 Dec 2018 02:18), Max: 37.6 (08 Dec 2018 17:32)  T(F): 98.1 (09 Dec 2018 02:18), Max: 99.7 (08 Dec 2018 17:32)  HR: 78 (09 Dec 2018 02:18) (78 - 89)  BP: 143/82 (09 Dec 2018 02:18) (121/81 - 154/82)  BP(mean): --  RR: 20 (09 Dec 2018 02:18) (20 - 22)  SpO2: 100% (09 Dec 2018 02:18) (100% - 100%)  NAD, awake and alert   OC/OP: tongue large protruding out of OC, no bleeding  Neck: 6LPC in place secured with trach tie and sutures x2, cuff deflated, copious secretions suctioned, 6cm conglomerate of left level II/III neck nodes  abdomen: peg in place     K 2.9- 3.2     A/P  68M w/ L tonsillar mass who presented to the ED with SOB with inability to tolerate his secretions s/p DLB.  -fu fever w/u: rcx gpc  -will advance feeds  -repeat labs, follow up K  -strict NPO by mouth  -pain control prn  -home meds  -routine trach care, trach teaching  -peg teaching  -DM control -FSG, ISS  -will follow-up with his MO/RO team upon discharge  -f/u path  -sqh   -PT/OT

## 2018-12-09 NOTE — OCCUPATIONAL THERAPY INITIAL EVALUATION ADULT - LIVES WITH, PROFILE
Pt. reports he lives with his girlfriend in a house with 5 steps to enter. Once inside, pt. reports he has a full flight of steps to negotiate to reach 2nd floor where main bedroom and bathroom are located. Per pt., he has a bathtub in his bathroom with shower chair available.

## 2018-12-09 NOTE — OCCUPATIONAL THERAPY INITIAL EVALUATION ADULT - PERTINENT HX OF CURRENT PROBLEM, REHAB EVAL
Pt is a 68 year old male with hx of asthma, htn, DM, who was recently diagnosed with left sided tonsillar mass s/p non-diagnostic tonsil biopsy in the office 11/13/18. Pt presented to [facility] on 12/4/18 with SOB. (See continued below)

## 2018-12-09 NOTE — OCCUPATIONAL THERAPY INITIAL EVALUATION ADULT - GENERAL OBSERVATIONS, REHAB EVAL
Pt. received sitting at edge of bed. No acute distress. Patient agreed to evaluation from Occupational Therapist. +Trach, +IV, +O2. Enhanced supervision present.

## 2018-12-09 NOTE — OCCUPATIONAL THERAPY INITIAL EVALUATION ADULT - LEVEL OF INDEPENDENCE: SIT/SUPINE, REHAB EVAL
Not assessed. Pt left sitting at edge of bed. No acute distress. Call bell in reach. All lines intact and precautions maintained. RN, made aware. Enhanced supervision present.

## 2018-12-10 LAB
BUN SERPL-MCNC: 11 MG/DL — SIGNIFICANT CHANGE UP (ref 7–23)
CALCIUM SERPL-MCNC: 9.5 MG/DL — SIGNIFICANT CHANGE UP (ref 8.4–10.5)
CHLORIDE SERPL-SCNC: 99 MMOL/L — SIGNIFICANT CHANGE UP (ref 98–107)
CO2 SERPL-SCNC: 29 MMOL/L — SIGNIFICANT CHANGE UP (ref 22–31)
CREAT SERPL-MCNC: 0.82 MG/DL — SIGNIFICANT CHANGE UP (ref 0.5–1.3)
GLUCOSE BLDC GLUCOMTR-MCNC: 115 MG/DL — HIGH (ref 70–99)
GLUCOSE BLDC GLUCOMTR-MCNC: 123 MG/DL — HIGH (ref 70–99)
GLUCOSE BLDC GLUCOMTR-MCNC: 126 MG/DL — HIGH (ref 70–99)
GLUCOSE BLDC GLUCOMTR-MCNC: 130 MG/DL — HIGH (ref 70–99)
GLUCOSE SERPL-MCNC: 112 MG/DL — HIGH (ref 70–99)
HCT VFR BLD CALC: 38.7 % — LOW (ref 39–50)
HGB BLD-MCNC: 13.2 G/DL — SIGNIFICANT CHANGE UP (ref 13–17)
MAGNESIUM SERPL-MCNC: 1.9 MG/DL — SIGNIFICANT CHANGE UP (ref 1.6–2.6)
MCHC RBC-ENTMCNC: 33.8 PG — SIGNIFICANT CHANGE UP (ref 27–34)
MCHC RBC-ENTMCNC: 34.1 % — SIGNIFICANT CHANGE UP (ref 32–36)
MCV RBC AUTO: 99.2 FL — SIGNIFICANT CHANGE UP (ref 80–100)
NRBC # FLD: 0 — SIGNIFICANT CHANGE UP
PHOSPHATE SERPL-MCNC: 2.8 MG/DL — SIGNIFICANT CHANGE UP (ref 2.5–4.5)
PLATELET # BLD AUTO: 167 K/UL — SIGNIFICANT CHANGE UP (ref 150–400)
PMV BLD: 12.3 FL — SIGNIFICANT CHANGE UP (ref 7–13)
POTASSIUM SERPL-MCNC: 3.6 MMOL/L — SIGNIFICANT CHANGE UP (ref 3.5–5.3)
POTASSIUM SERPL-SCNC: 3.6 MMOL/L — SIGNIFICANT CHANGE UP (ref 3.5–5.3)
RBC # BLD: 3.9 M/UL — LOW (ref 4.2–5.8)
RBC # FLD: 12.2 % — SIGNIFICANT CHANGE UP (ref 10.3–14.5)
SODIUM SERPL-SCNC: 142 MMOL/L — SIGNIFICANT CHANGE UP (ref 135–145)
WBC # BLD: 10.76 K/UL — HIGH (ref 3.8–10.5)
WBC # FLD AUTO: 10.76 K/UL — HIGH (ref 3.8–10.5)

## 2018-12-10 RX ORDER — ACETAMINOPHEN 500 MG
1000 TABLET ORAL ONCE
Qty: 0 | Refills: 0 | Status: COMPLETED | OUTPATIENT
Start: 2018-12-10 | End: 2018-12-10

## 2018-12-10 RX ADMIN — SENNA PLUS 10 MILLILITER(S): 8.6 TABLET ORAL at 23:28

## 2018-12-10 RX ADMIN — HEPARIN SODIUM 5000 UNIT(S): 5000 INJECTION INTRAVENOUS; SUBCUTANEOUS at 17:20

## 2018-12-10 RX ADMIN — Medication 81 MILLIGRAM(S): at 12:21

## 2018-12-10 RX ADMIN — HEPARIN SODIUM 5000 UNIT(S): 5000 INJECTION INTRAVENOUS; SUBCUTANEOUS at 23:28

## 2018-12-10 RX ADMIN — OXYCODONE HYDROCHLORIDE 10 MILLIGRAM(S): 5 TABLET ORAL at 23:29

## 2018-12-10 RX ADMIN — Medication 1000 MILLIGRAM(S): at 21:48

## 2018-12-10 RX ADMIN — Medication 100 MILLIGRAM(S): at 17:20

## 2018-12-10 RX ADMIN — LOSARTAN POTASSIUM 50 MILLIGRAM(S): 100 TABLET, FILM COATED ORAL at 07:18

## 2018-12-10 RX ADMIN — OXYCODONE HYDROCHLORIDE 5 MILLIGRAM(S): 5 TABLET ORAL at 18:15

## 2018-12-10 RX ADMIN — Medication 12.5 MILLIGRAM(S): at 07:18

## 2018-12-10 RX ADMIN — Medication 100 MILLIGRAM(S): at 07:18

## 2018-12-10 RX ADMIN — OXYCODONE HYDROCHLORIDE 5 MILLIGRAM(S): 5 TABLET ORAL at 17:20

## 2018-12-10 RX ADMIN — HEPARIN SODIUM 5000 UNIT(S): 5000 INJECTION INTRAVENOUS; SUBCUTANEOUS at 00:13

## 2018-12-10 RX ADMIN — HEPARIN SODIUM 5000 UNIT(S): 5000 INJECTION INTRAVENOUS; SUBCUTANEOUS at 09:49

## 2018-12-10 RX ADMIN — Medication 400 MILLIGRAM(S): at 21:18

## 2018-12-10 NOTE — PROGRESS NOTE ADULT - SUBJECTIVE AND OBJECTIVE BOX
Pt seen and examined. Tolerating bolus tube feeds.     HR   NAD, awake and alert   OC/OP: tongue large protruding out of OC, no bleeding  Neck: 6LPC in place secured with trach tie and sutures x2, cuff deflated, secretions suctioned, 6cm conglomerate of left level II/III neck nodes  abdomen: peg in place     path: inflammatory mucosa    A/P  68M w/ L tonsillar mass who presented to the ED with SOB with inability to tolerate his secretions s/p DLB.  -fu fever w/u: rcx gpc  -cont bolus tube feeds  -fu labs  -strict NPO by mouth  -pain control prn  -home meds  -routine trach care, trach teaching  -peg teaching  -DM control -FSG, ISS  -will follow-up with his MO/RO team upon discharge  -[facility]   -PT/OT: home pt

## 2018-12-11 LAB
BUN SERPL-MCNC: 11 MG/DL — SIGNIFICANT CHANGE UP (ref 7–23)
CALCIUM SERPL-MCNC: 9.3 MG/DL — SIGNIFICANT CHANGE UP (ref 8.4–10.5)
CHLORIDE SERPL-SCNC: 96 MMOL/L — LOW (ref 98–107)
CO2 SERPL-SCNC: 28 MMOL/L — SIGNIFICANT CHANGE UP (ref 22–31)
CREAT SERPL-MCNC: 0.78 MG/DL — SIGNIFICANT CHANGE UP (ref 0.5–1.3)
GLUCOSE BLDC GLUCOMTR-MCNC: 112 MG/DL — HIGH (ref 70–99)
GLUCOSE BLDC GLUCOMTR-MCNC: 123 MG/DL — HIGH (ref 70–99)
GLUCOSE BLDC GLUCOMTR-MCNC: 139 MG/DL — HIGH (ref 70–99)
GLUCOSE BLDC GLUCOMTR-MCNC: 155 MG/DL — HIGH (ref 70–99)
GLUCOSE SERPL-MCNC: 108 MG/DL — HIGH (ref 70–99)
HCT VFR BLD CALC: 36.9 % — LOW (ref 39–50)
HGB BLD-MCNC: 12.7 G/DL — LOW (ref 13–17)
MAGNESIUM SERPL-MCNC: 2.1 MG/DL — SIGNIFICANT CHANGE UP (ref 1.6–2.6)
MCHC RBC-ENTMCNC: 33 PG — SIGNIFICANT CHANGE UP (ref 27–34)
MCHC RBC-ENTMCNC: 34.4 % — SIGNIFICANT CHANGE UP (ref 32–36)
MCV RBC AUTO: 95.8 FL — SIGNIFICANT CHANGE UP (ref 80–100)
NRBC # FLD: 0 — SIGNIFICANT CHANGE UP
PHOSPHATE SERPL-MCNC: 3.4 MG/DL — SIGNIFICANT CHANGE UP (ref 2.5–4.5)
PLATELET # BLD AUTO: 157 K/UL — SIGNIFICANT CHANGE UP (ref 150–400)
PMV BLD: 12.5 FL — SIGNIFICANT CHANGE UP (ref 7–13)
POTASSIUM SERPL-MCNC: 3.6 MMOL/L — SIGNIFICANT CHANGE UP (ref 3.5–5.3)
POTASSIUM SERPL-SCNC: 3.6 MMOL/L — SIGNIFICANT CHANGE UP (ref 3.5–5.3)
RBC # BLD: 3.85 M/UL — LOW (ref 4.2–5.8)
RBC # FLD: 12.1 % — SIGNIFICANT CHANGE UP (ref 10.3–14.5)
SODIUM SERPL-SCNC: 137 MMOL/L — SIGNIFICANT CHANGE UP (ref 135–145)
WBC # BLD: 8.98 K/UL — SIGNIFICANT CHANGE UP (ref 3.8–10.5)
WBC # FLD AUTO: 8.98 K/UL — SIGNIFICANT CHANGE UP (ref 3.8–10.5)

## 2018-12-11 PROCEDURE — 70491 CT SOFT TISSUE NECK W/DYE: CPT | Mod: 26

## 2018-12-11 PROCEDURE — 99223 1ST HOSP IP/OBS HIGH 75: CPT

## 2018-12-11 PROCEDURE — 99231 SBSQ HOSP IP/OBS SF/LOW 25: CPT | Mod: GC

## 2018-12-11 RX ORDER — OXYCODONE HYDROCHLORIDE 5 MG/1
5 TABLET ORAL EVERY 6 HOURS
Qty: 0 | Refills: 0 | Status: DISCONTINUED | OUTPATIENT
Start: 2018-12-11 | End: 2018-12-18

## 2018-12-11 RX ORDER — HEPARIN SODIUM 5000 [USP'U]/ML
5000 INJECTION INTRAVENOUS; SUBCUTANEOUS EVERY 8 HOURS
Qty: 0 | Refills: 0 | Status: DISCONTINUED | OUTPATIENT
Start: 2018-12-11 | End: 2018-12-18

## 2018-12-11 RX ORDER — MORPHINE SULFATE 50 MG/1
4 CAPSULE, EXTENDED RELEASE ORAL EVERY 6 HOURS
Qty: 0 | Refills: 0 | Status: DISCONTINUED | OUTPATIENT
Start: 2018-12-11 | End: 2018-12-11

## 2018-12-11 RX ORDER — OXYCODONE HYDROCHLORIDE 5 MG/1
10 TABLET ORAL EVERY 6 HOURS
Qty: 0 | Refills: 0 | Status: DISCONTINUED | OUTPATIENT
Start: 2018-12-11 | End: 2018-12-17

## 2018-12-11 RX ORDER — SODIUM CHLORIDE 9 MG/ML
1000 INJECTION, SOLUTION INTRAVENOUS
Qty: 0 | Refills: 0 | Status: DISCONTINUED | OUTPATIENT
Start: 2018-12-11 | End: 2018-12-12

## 2018-12-11 RX ADMIN — MORPHINE SULFATE 4 MILLIGRAM(S): 50 CAPSULE, EXTENDED RELEASE ORAL at 03:50

## 2018-12-11 RX ADMIN — HEPARIN SODIUM 5000 UNIT(S): 5000 INJECTION INTRAVENOUS; SUBCUTANEOUS at 15:47

## 2018-12-11 RX ADMIN — LOSARTAN POTASSIUM 50 MILLIGRAM(S): 100 TABLET, FILM COATED ORAL at 07:00

## 2018-12-11 RX ADMIN — OXYCODONE HYDROCHLORIDE 10 MILLIGRAM(S): 5 TABLET ORAL at 20:03

## 2018-12-11 RX ADMIN — HEPARIN SODIUM 5000 UNIT(S): 5000 INJECTION INTRAVENOUS; SUBCUTANEOUS at 07:00

## 2018-12-11 RX ADMIN — Medication 2: at 12:13

## 2018-12-11 RX ADMIN — SENNA PLUS 10 MILLILITER(S): 8.6 TABLET ORAL at 22:10

## 2018-12-11 RX ADMIN — HEPARIN SODIUM 5000 UNIT(S): 5000 INJECTION INTRAVENOUS; SUBCUTANEOUS at 22:10

## 2018-12-11 RX ADMIN — SODIUM CHLORIDE 100 MILLILITER(S): 9 INJECTION, SOLUTION INTRAVENOUS at 23:51

## 2018-12-11 RX ADMIN — Medication 100 MILLIGRAM(S): at 07:01

## 2018-12-11 RX ADMIN — MORPHINE SULFATE 4 MILLIGRAM(S): 50 CAPSULE, EXTENDED RELEASE ORAL at 03:35

## 2018-12-11 RX ADMIN — Medication 81 MILLIGRAM(S): at 12:12

## 2018-12-11 RX ADMIN — OXYCODONE HYDROCHLORIDE 10 MILLIGRAM(S): 5 TABLET ORAL at 08:01

## 2018-12-11 RX ADMIN — OXYCODONE HYDROCHLORIDE 10 MILLIGRAM(S): 5 TABLET ORAL at 19:48

## 2018-12-11 RX ADMIN — OXYCODONE HYDROCHLORIDE 10 MILLIGRAM(S): 5 TABLET ORAL at 07:01

## 2018-12-11 RX ADMIN — Medication 100 MILLIGRAM(S): at 18:25

## 2018-12-11 RX ADMIN — OXYCODONE HYDROCHLORIDE 10 MILLIGRAM(S): 5 TABLET ORAL at 00:29

## 2018-12-11 RX ADMIN — Medication 12.5 MILLIGRAM(S): at 07:00

## 2018-12-11 NOTE — CHART NOTE - NSCHARTNOTEFT_GEN_A_CORE
Source: Patient [ X]    Family [ ]     other [X] electronic chart, RN     Diet : NPO with Tube Feed via gastrostomy bolus feeds of Glucerna1.2 at 300mL every 6 hours.     Patient seen for nutrition consult x TF. Patient was recently seen by an RDN on 12/8/18. Patient current tube feed regimen provides total volume of 1200mL, 1440kcal and 72g pro which does not meet estimated needs. Recommend increase bolus feeds to 350ml every 6 hours to provide total volume of 1400ml, 1680kcal and 84g pro (25kcal/kg and 1.3g/kg of IBW). Patient is tolerating tube feeds well as confirmed with RN. Denies any nausea/vomiting/diarrhea/constipation at this time.     Current Weight: Weight (kg): 97.5 (12-07 @ 12:58)    Pertinent Medications: dextrose 5%.  dextrose 50% Injectable  dextrose 50% Injectable  dextrose 50% Injectable  docusate sodium Liquid  hydrochlorothiazide  insulin lispro (HumaLOG) corrective regimen sliding scale  losartan  senna Syrup    Pertinent Labs:  12-11 Na137 mmol/L Glu 108 mg/dL<H> K+ 3.6 mmol/L Cr  0.78 mg/dL BUN 11 mg/dL 12-11 Phos 3.4 mg/dL 12-04 Alb 4.9 g/dL 11-29 [id number] 5.9 %<H>    Skin: intact. No edema noted.    Previous Nutrition Diagnosis:     [X ] Inadequate Energy Intake [ ]Inadequate Oral Intake [ ] Excessive Energy Intake     [ ] Underweight [ ] Increased Nutrient Needs [ ] Overweight/Obesity     [ ] Altered GI Function [ ] Unintended Weight Loss [ ] Food & Nutrition Related Knowledge Deficit [ ] Malnutrition      Nutrition Diagnosis is [ X] ongoing  [ ] resolved [ ] not applicable     Additional Recommendations:   1. Recommend increase bolus feeds of Glucerna 1.2 to 350ml every 6 hours yields total volume of 1400 ml, 1680 kcal and 84 grams of protein.   2. Further adjustments to rate/volume/duration/free water provision of enteral feeds dependant on long term monitoring of patient's tolerance, weight trends and needs.   3. Monitor weights, labs, BM's, skin integrity, tube feed tolerance.

## 2018-12-11 NOTE — PROGRESS NOTE ADULT - SUBJECTIVE AND OBJECTIVE BOX
IR procedure note:    US/CT guided biopsy of neck LN    Attending;   Dr. Billy    IR attending  Dr. Hernandez    On anticoagulation;  no    Can patient consent; yes

## 2018-12-11 NOTE — PROGRESS NOTE ADULT - SUBJECTIVE AND OBJECTIVE BOX
Pt seen and examined. Tolerating tube feeds.   PT cleared patient for home.   Biopsies still showing inflammatory cells but no definitive dx   Has been afebrile now     NAD, awake and alert   OC/OP: tongue large protruding out of OC, no bleeding  Neck: 6LPC in place secured with trach tie and sutures x2, cuff deflated, copious secretions suctioned, 6cm conglomerate of left level II/III neck nodes  abdomen: peg in place     A/P  68M w/ L tonsillar mass who presented to the ED with SOB with inability to tolerate his secretions s/p DLB.  -discussed with ID, hold abx for now   -bolus tube feeds   -strict NPO by mouth  -pain control prn  -home meds  -routine trach care, trach teaching  -peg teaching  -DM control -FSG, ISS  -will follow-up with his MO/RO team upon discharge  -f/u path  -sqh   -PT/OT  -dispo: SW assistance appreciated, pt will need PEG tube for at least 3 months, failed MBS

## 2018-12-11 NOTE — CHART NOTE - NSCHARTNOTEFT_GEN_A_CORE
VASCULAR & INTERVENTIONAL RADIOLOGY    HPI:  68M hx of recently diagnosed left sided tonsillar mass s/p non-diagnostic tonsil biopsy in the office 11/13/18 and laryngoscopic biopsy which was inconclusive. Consult is for cervical lymph node biopsy.    Case reviewed by Dr. Hernandez. CT neck from 12/11/2018 demonstrated cervical lymphadenopathy include a 4.5 x 3.6 cm left level 2 cervical lymph node. Case is approved for ultrasound-guided biopsy under local anesthesia.    Plan:  - ultrasound-guided biopsy for 12/12/2018  - request pre-IR checklist, pre-procedure note and IR order (under Dr. Hernandez)    Please contact us if the clinical situation changes or if you have any questions/comments/concerns.    Josué Garcia MD  PGY-3  Pager #[phone] VASCULAR & INTERVENTIONAL RADIOLOGY    HPI:  68M hx of recently diagnosed left sided tonsillar mass s/p non-diagnostic tonsil biopsy in the office 11/13/18 and laryngoscopic biopsy which was inconclusive. Consult is for cervical lymph node biopsy.    Case reviewed by Dr. Hernandez. CT neck from 12/11/2018 demonstrated cervical lymphadenopathy. Case is approved for ultrasound-guided biopsy under local anesthesia.    Plan:  - ultrasound-guided biopsy of cervical lymph node for 12/12/2018  - request pre-IR checklist, pre-procedure note and IR order (under Dr. Hernandez)    Please contact us if the clinical situation changes or if you have any questions/comments/concerns.    Josué Garcia MD  PGY-3  Pager #[phone]

## 2018-12-12 ENCOUNTER — RESULT REVIEW (OUTPATIENT)
Age: 68
End: 2018-12-12

## 2018-12-12 DIAGNOSIS — I10 ESSENTIAL (PRIMARY) HYPERTENSION: ICD-10-CM

## 2018-12-12 DIAGNOSIS — R50.9 FEVER, UNSPECIFIED: ICD-10-CM

## 2018-12-12 LAB
BACTERIA BLD CULT: SIGNIFICANT CHANGE UP
GLUCOSE BLDC GLUCOMTR-MCNC: 128 MG/DL — HIGH (ref 70–99)
GLUCOSE BLDC GLUCOMTR-MCNC: 140 MG/DL — HIGH (ref 70–99)
GLUCOSE BLDC GLUCOMTR-MCNC: 141 MG/DL — HIGH (ref 70–99)
GLUCOSE BLDC GLUCOMTR-MCNC: 146 MG/DL — HIGH (ref 70–99)
GRAM STN SPT: SIGNIFICANT CHANGE UP
SPECIMEN SOURCE: SIGNIFICANT CHANGE UP

## 2018-12-12 PROCEDURE — 88342 IMHCHEM/IMCYTCHM 1ST ANTB: CPT | Mod: 26,59

## 2018-12-12 PROCEDURE — 88367 INSITU HYBRIDIZATION AUTO: CPT | Mod: 26

## 2018-12-12 PROCEDURE — 71045 X-RAY EXAM CHEST 1 VIEW: CPT | Mod: 26

## 2018-12-12 PROCEDURE — 88173 CYTOPATH EVAL FNA REPORT: CPT | Mod: 26

## 2018-12-12 PROCEDURE — 88360 TUMOR IMMUNOHISTOCHEM/MANUAL: CPT | Mod: 26

## 2018-12-12 PROCEDURE — 38505 NEEDLE BIOPSY LYMPH NODES: CPT

## 2018-12-12 PROCEDURE — 76942 ECHO GUIDE FOR BIOPSY: CPT | Mod: 26

## 2018-12-12 PROCEDURE — 88341 IMHCHEM/IMCYTCHM EA ADD ANTB: CPT | Mod: 26,59

## 2018-12-12 PROCEDURE — 88305 TISSUE EXAM BY PATHOLOGIST: CPT | Mod: 26

## 2018-12-12 PROCEDURE — 88365 INSITU HYBRIDIZATION (FISH): CPT | Mod: 26,59

## 2018-12-12 PROCEDURE — 99233 SBSQ HOSP IP/OBS HIGH 50: CPT

## 2018-12-12 PROCEDURE — 88189 FLOWCYTOMETRY/READ 16 & >: CPT

## 2018-12-12 RX ORDER — VANCOMYCIN HCL 1 G
1000 VIAL (EA) INTRAVENOUS EVERY 12 HOURS
Qty: 0 | Refills: 0 | Status: DISCONTINUED | OUTPATIENT
Start: 2018-12-12 | End: 2018-12-14

## 2018-12-12 RX ORDER — PIPERACILLIN AND TAZOBACTAM 4; .5 G/20ML; G/20ML
3.38 INJECTION, POWDER, LYOPHILIZED, FOR SOLUTION INTRAVENOUS EVERY 8 HOURS
Qty: 0 | Refills: 0 | Status: DISCONTINUED | OUTPATIENT
Start: 2018-12-12 | End: 2018-12-17

## 2018-12-12 RX ADMIN — HEPARIN SODIUM 5000 UNIT(S): 5000 INJECTION INTRAVENOUS; SUBCUTANEOUS at 21:26

## 2018-12-12 RX ADMIN — Medication 12.5 MILLIGRAM(S): at 05:20

## 2018-12-12 RX ADMIN — SENNA PLUS 10 MILLILITER(S): 8.6 TABLET ORAL at 21:26

## 2018-12-12 RX ADMIN — Medication 81 MILLIGRAM(S): at 14:38

## 2018-12-12 RX ADMIN — PIPERACILLIN AND TAZOBACTAM 25 GRAM(S): 4; .5 INJECTION, POWDER, LYOPHILIZED, FOR SOLUTION INTRAVENOUS at 21:27

## 2018-12-12 RX ADMIN — HEPARIN SODIUM 5000 UNIT(S): 5000 INJECTION INTRAVENOUS; SUBCUTANEOUS at 14:38

## 2018-12-12 RX ADMIN — Medication 250 MILLIGRAM(S): at 19:41

## 2018-12-12 RX ADMIN — PIPERACILLIN AND TAZOBACTAM 25 GRAM(S): 4; .5 INJECTION, POWDER, LYOPHILIZED, FOR SOLUTION INTRAVENOUS at 14:39

## 2018-12-12 RX ADMIN — Medication 100 MILLIGRAM(S): at 05:19

## 2018-12-12 RX ADMIN — LOSARTAN POTASSIUM 50 MILLIGRAM(S): 100 TABLET, FILM COATED ORAL at 05:20

## 2018-12-12 RX ADMIN — HEPARIN SODIUM 5000 UNIT(S): 5000 INJECTION INTRAVENOUS; SUBCUTANEOUS at 05:20

## 2018-12-12 NOTE — CHART NOTE - NSCHARTNOTEFT_GEN_A_CORE
69 y/o male, known to our service s/p PEG placement (). Notified by team regarding "pain at the PEG site". Upon bedside assessment, pt found sitting up at the edge of the bed, trach and PEG in place. PEG site without abnormal discharge, acute bleeding, erythema. There was no displacement of tube. Area around was not tender to palpation but patient did have mild discomfort with manipulation of PEG tubing at insertion site, abdomen was soft x4 quadrants. Discussed with Pt's nurse who stated pt has been getting his tube feeds without issue and explained there were no acute findings.     - There are no acute findings at time of examination.  - May continue to use.   - PEG loosened on 12/8/18.   - Continue care per primary team.  - Monitor for worsening pain or issues with PEG and call if there are concerns.     Thoracic PA

## 2018-12-12 NOTE — CONSULT NOTE ADULT - PROBLEM SELECTOR RECOMMENDATION 9
patient with fever 3 days ago, fever today of 101.4.  -broad differential infectious vs. DVT vs. malignancy.  -patient with +trach culture for MRSA, likely cause, though taken on the 7th, fevers started 10th.  -would treat with Vancomycin  -BCx, UA, CXR (pending)  -would obtain dopper LE to r/o DVT.  -continue vancomycin and Zosyn (also at aspiration risk given LAd and tongue swelling)

## 2018-12-12 NOTE — PROGRESS NOTE ADULT - SUBJECTIVE AND OBJECTIVE BOX
Pt seen and examined. Tolerating tube feeds. NPO for IR procedure today. Fever 101.4 this morning    T101.4  NAD, awake and alert   OC/OP: tongue large protruding out of OC, no bleeding  Neck: 6LPC in place secured with trach tie and sutures x2, cuff deflated, copious secretions suctioned, 6cm conglomerate of left level II/III neck nodes  abdomen: peg in place     A/P  68M w/ L tonsillar mass who presented to the ED with SOB with inability to tolerate his secretions s/p DLB.  -IR procedure today for US guided biopsy of neck LN  -f/u fever workup  -discussed with ID, hold abx for now   -bolus tube feeds   -strict NPO by mouth  -pain control prn  -home meds  -routine trach care, trach teaching  -peg teaching  -DM control -FSG, ISS  -will follow-up with his MO/RO team upon discharge  -f/u path  -[facility]   -PT/OT  -dispo: SW assistance appreciated, pt will need PEG tube for at least 3 months, failed MBS

## 2018-12-12 NOTE — CONSULT NOTE ADULT - ASSESSMENT
67 yo male PMHx HTN, diet controlled DM type 2, asthma recent tonsillar mass Bx in clinic admitted for airway protection s/p trach with Biopsy negative for malignancy with increasing LAD and now fevers, consulted for medical management and fever workup.

## 2018-12-12 NOTE — CONSULT NOTE ADULT - SUBJECTIVE AND OBJECTIVE BOX
HPI:  68M w/ h/o asthma, htn, DM (diet controlled) and recently diagnosed left sided tonsillar mass s/p non-diagnostic tonsil biopsy in the office 11/13/18 who presented to the ED with SOB. At that time flexible laryngoscopy showed patent airway but inability to tolerate secretions with pooling of secretions in the hypopharynx and suspected aspiration. Awake nasal fiberoptic intubation was attempted by the anesthesia team in the ED however without success and the patient was then brought urgently to the OR for fiberoptic intubation, tracheotomy, direct laryngoscopy, biopsy on 12/4 and transferred to SICU.  Thoracic Surgery was consjulted for PEG on 12/7 afer patient extubated and stable for transfer out of SICU.  On 12/7, patient agitated o/n and given haldol 2mg and ativan 2mg.  On 12/10, patient had a low grade fever of 100.3, discussed with ID per ENT note, and observed off of Abx.  On same day Trach cultures were positivefor MRSA.  Morning of 12/12, patient spiked fever of 101.4, patient started on vancomycin and Zosyn, BCx ordered along with CXR and medicine consulted for further evaluation.    Currently,        PAST MEDICAL & SURGICAL HISTORY:  Sleep apnea  Asthma: Denies recent hospitalizations for asthma  Localized swelling, mass and lump, head  Tonsil cancer  DM (diabetes mellitus): Not on any medications  HTN (hypertension)  History of surgery: Right knee replacement - surgery  Forearm fracture      Review of Systems:   all ROS negative except for that noted in above HPI    Allergies    No Known Allergies    Intolerances        Social History:     FAMILY HISTORY:  No pertinent family history in first degree relatives    HOME MEDCS: HCTZ 12.5mg daily  Losartan 50mg daily  Ibuprofen  ASA 81    MEDICATIONS  (STANDING):  aspirin  chewable 81 milliGRAM(s) Oral daily  dextrose 5% + sodium chloride 0.45%. 1000 milliLiter(s) (100 mL/Hr) IV Continuous <Continuous>  dextrose 5%. 1000 milliLiter(s) (50 mL/Hr) IV Continuous <Continuous>  dextrose 50% Injectable 12.5 Gram(s) IV Push once  dextrose 50% Injectable 25 Gram(s) IV Push once  dextrose 50% Injectable 25 Gram(s) IV Push once  docusate sodium Liquid 100 milliGRAM(s) Oral two times a day  heparin  Injectable 5000 Unit(s) SubCutaneous every 8 hours  hydrochlorothiazide 12.5 milliGRAM(s) Oral daily  insulin lispro (HumaLOG) corrective regimen sliding scale   SubCutaneous every 6 hours  losartan 50 milliGRAM(s) Oral daily  piperacillin/tazobactam IVPB. 3.375 Gram(s) IV Intermittent every 8 hours  senna Syrup 10 milliLiter(s) Oral at bedtime  vancomycin  IVPB 1000 milliGRAM(s) IV Intermittent every 12 hours    MEDICATIONS  (PRN):  acetaminophen    Suspension .. 650 milliGRAM(s) Oral every 6 hours PRN Mild Pain (1 - 3)  dextrose 40% Gel 15 Gram(s) Oral once PRN Blood Glucose LESS THAN 70 milliGRAM(s)/deciliter  glucagon  Injectable 1 milliGRAM(s) IntraMuscular once PRN Glucose LESS THAN 70 milligrams/deciliter  oxyCODONE    Solution 5 milliGRAM(s) Oral every 6 hours PRN Moderate Pain (4 - 6)  oxyCODONE    Solution 10 milliGRAM(s) Oral every 6 hours PRN Severe Pain (7 - 10)      T(C): 37.1 (12-12-18 @ 09:33), Max: 38.6 (12-12-18 @ 05:00)  HR: 97 (12-12-18 @ 09:33) (91 - 108)  BP: 145/89 (12-12-18 @ 09:33) (130/88 - 149/81)  RR: 18 (12-12-18 @ 09:33) (18 - 18)  SpO2: 98% (12-12-18 @ 09:33) (96% - 98%)  CAPILLARY BLOOD GLUCOSE      POCT Blood Glucose.: 146 mg/dL (12 Dec 2018 12:57)  POCT Blood Glucose.: 140 mg/dL (12 Dec 2018 05:53)  POCT Blood Glucose.: 139 mg/dL (11 Dec 2018 23:27)  POCT Blood Glucose.: 123 mg/dL (11 Dec 2018 18:04)    I&O's Summary    11 Dec 2018 07:01  -  12 Dec 2018 07:00  --------------------------------------------------------  IN: 1750 mL / OUT: 850 mL / NET: 900 mL    12 Dec 2018 07:01  -  12 Dec 2018 13:38  --------------------------------------------------------  IN: 300 mL / OUT: 0 mL / NET: 300 mL        PHYSICAL EXAM:  GENERAL: NAD, well-developed  HEAD:  Atraumatic, Normocephalic  EYES: EOMI, PERRLA, conjunctiva and sclera clear  NECK: Supple, No JVD  CHEST/LUNG: Clear to auscultation bilaterally; No wheeze  HEART: Regular rate and rhythm; No murmurs, rubs, or gallops  ABDOMEN: Soft, Nontender, Nondistended; Bowel sounds present  EXTREMITIES:  2+ Peripheral Pulses, No clubbing, cyanosis, or edema  PSYCH: AAOx3  NEUROLOGY: non-focal  SKIN: No rashes or lesions    LABS:                        12.7   8.98  )-----------( 157      ( 11 Dec 2018 05:55 )             36.9     12-11    137  |  96<L>  |  11  ----------------------------<  108<H>  3.6   |  28  |  0.78    Ca    9.3      11 Dec 2018 05:55  Phos  3.4     12-11  Mg     2.1     12-11                RADIOLOGY & ADDITIONAL TESTS:    Imaging Personally Reviewed:  < from: CT Neck Soft Tissue w/ IV Cont (12.11.18 @ 14:53) >  An air-fluid level involves the left maxillary sinus. The left mastoid   air cells are moderately opacified.    IMPRESSION:    There has been a marked interval increase in size of the pharyngeal tumor   compared to the October and November 2018 outside examinations.     Pathologic lymphadenopathy is noted with distribution as described.        < end of copied text >      Consultant(s) Notes Reviewed:  ENT    Care Discussed with Consultants/Other Providers: ENT PA HPI:  68M w/ h/o asthma, htn, DM (diet controlled) and recently diagnosed left sided tonsillar mass s/p non-diagnostic tonsil biopsy in the office 11/13/18 who presented to the ED with SOB. At that time flexible laryngoscopy showed patent airway but inability to tolerate secretions with pooling of secretions in the hypopharynx and suspected aspiration. Awake nasal fiberoptic intubation was attempted by the anesthesia team in the ED however without success and the patient was then brought urgently to the OR for fiberoptic intubation, tracheotomy, direct laryngoscopy, biopsy on 12/4 and transferred to SICU.  Thoracic Surgery was consjulted for PEG on 12/7 afer patient extubated and stable for transfer out of SICU.  On 12/7, patient agitated o/n and given haldol 2mg and ativan 2mg.  On 12/10, patient had a low grade fever of 100.3, discussed with ID per ENT note, and observed off of Abx.  On same day Trach cultures were positivefor MRSA.  Morning of 12/12, patient spiked fever of 101.4, patient started on vancomycin and Zosyn, BCx ordered along with CXR and medicine consulted for further evaluation.    Currently, patient s/p IR guided biops.y  States tongue protuberance no worse.  +incresaed sputum production, more thick, no change in fcolor.  No current fever, chills, chest pain, SOB, abdominal pain, n/v/c/d.         PAST MEDICAL & SURGICAL HISTORY:  Sleep apnea  Asthma: Denies recent hospitalizations for asthma  Localized swelling, mass and lump, head  Tonsil cancer  DM (diabetes mellitus): Not on any medications  HTN (hypertension)  History of surgery: Right knee replacement - surgery  Forearm fracture      Review of Systems:   all ROS negative except for that noted in above HPI    Allergies    No Known Allergies    Intolerances        Social History: current 20 pack year smoker.     FAMILY HISTORY:  No pertinent family history in first degree relatives    HOME MEDCS: HCTZ 12.5mg daily  Losartan 50mg daily  Ibuprofen  ASA 81    MEDICATIONS  (STANDING):  aspirin  chewable 81 milliGRAM(s) Oral daily  dextrose 5% + sodium chloride 0.45%. 1000 milliLiter(s) (100 mL/Hr) IV Continuous <Continuous>  dextrose 5%. 1000 milliLiter(s) (50 mL/Hr) IV Continuous <Continuous>  dextrose 50% Injectable 12.5 Gram(s) IV Push once  dextrose 50% Injectable 25 Gram(s) IV Push once  dextrose 50% Injectable 25 Gram(s) IV Push once  docusate sodium Liquid 100 milliGRAM(s) Oral two times a day  heparin  Injectable 5000 Unit(s) SubCutaneous every 8 hours  hydrochlorothiazide 12.5 milliGRAM(s) Oral daily  insulin lispro (HumaLOG) corrective regimen sliding scale   SubCutaneous every 6 hours  losartan 50 milliGRAM(s) Oral daily  piperacillin/tazobactam IVPB. 3.375 Gram(s) IV Intermittent every 8 hours  senna Syrup 10 milliLiter(s) Oral at bedtime  vancomycin  IVPB 1000 milliGRAM(s) IV Intermittent every 12 hours    MEDICATIONS  (PRN):  acetaminophen    Suspension .. 650 milliGRAM(s) Oral every 6 hours PRN Mild Pain (1 - 3)  dextrose 40% Gel 15 Gram(s) Oral once PRN Blood Glucose LESS THAN 70 milliGRAM(s)/deciliter  glucagon  Injectable 1 milliGRAM(s) IntraMuscular once PRN Glucose LESS THAN 70 milligrams/deciliter  oxyCODONE    Solution 5 milliGRAM(s) Oral every 6 hours PRN Moderate Pain (4 - 6)  oxyCODONE    Solution 10 milliGRAM(s) Oral every 6 hours PRN Severe Pain (7 - 10)      T(C): 37.1 (12-12-18 @ 09:33), Max: 38.6 (12-12-18 @ 05:00)  HR: 97 (12-12-18 @ 09:33) (91 - 108)  BP: 145/89 (12-12-18 @ 09:33) (130/88 - 149/81)  RR: 18 (12-12-18 @ 09:33) (18 - 18)  SpO2: 98% (12-12-18 @ 09:33) (96% - 98%)  CAPILLARY BLOOD GLUCOSE      POCT Blood Glucose.: 146 mg/dL (12 Dec 2018 12:57)  POCT Blood Glucose.: 140 mg/dL (12 Dec 2018 05:53)  POCT Blood Glucose.: 139 mg/dL (11 Dec 2018 23:27)  POCT Blood Glucose.: 123 mg/dL (11 Dec 2018 18:04)    I&O's Summary    11 Dec 2018 07:01  -  12 Dec 2018 07:00  --------------------------------------------------------  IN: 1750 mL / OUT: 850 mL / NET: 900 mL    12 Dec 2018 07:01  -  12 Dec 2018 13:38  --------------------------------------------------------  IN: 300 mL / OUT: 0 mL / NET: 300 mL        PHYSICAL EXAM:  GENERAL: NAD, well-developed  HEAD:  Atraumatic, Normocephalic  ENT: EOMI, conjunctiva and sclera clear. Tongue protuberance.   NECK: Supple, No JVD. +trach. +LAD  CHEST/LUNG: decreased BS LLL with expiratory rhonchi.  CTA otherwise.   HEART: Regular rate and rhythm; No murmurs, rubs, or gallops  ABDOMEN: Soft, Nontender, Nondistended; Bowel sounds present  EXTREMITIES:  2+ Peripheral Pulses, No clubbing, cyanosis, or edema  PSYCH: AAOx3  NEUROLOGY: non-focal  SKIN: No rashes or lesions    LABS:                        12.7   8.98  )-----------( 157      ( 11 Dec 2018 05:55 )             36.9     12-11    137  |  96<L>  |  11  ----------------------------<  108<H>  3.6   |  28  |  0.78    Ca    9.3      11 Dec 2018 05:55  Phos  3.4     12-11  Mg     2.1     12-11                RADIOLOGY & ADDITIONAL TESTS:    Imaging Personally Reviewed:  < from: CT Neck Soft Tissue w/ IV Cont (12.11.18 @ 14:53) >  An air-fluid level involves the left maxillary sinus. The left mastoid   air cells are moderately opacified.    IMPRESSION:    There has been a marked interval increase in size of the pharyngeal tumor   compared to the October and November 2018 outside examinations.     Pathologic lymphadenopathy is noted with distribution as described.        < end of copied text >      Consultant(s) Notes Reviewed:  ENT    Care Discussed with Consultants/Other Providers: ENT PA

## 2018-12-12 NOTE — CONSULT NOTE ADULT - PROBLEM SELECTOR RECOMMENDATION 4
BP within goal, for now continue with current therapy, monitor Creatinine.  -if worsening fevers, would hold in setting of vasoplegia.

## 2018-12-13 LAB
BUN SERPL-MCNC: 11 MG/DL — SIGNIFICANT CHANGE UP (ref 7–23)
CALCIUM SERPL-MCNC: 9.2 MG/DL — SIGNIFICANT CHANGE UP (ref 8.4–10.5)
CHLORIDE SERPL-SCNC: 95 MMOL/L — LOW (ref 98–107)
CO2 SERPL-SCNC: 27 MMOL/L — SIGNIFICANT CHANGE UP (ref 22–31)
CREAT SERPL-MCNC: 0.9 MG/DL — SIGNIFICANT CHANGE UP (ref 0.5–1.3)
GLUCOSE BLDC GLUCOMTR-MCNC: 115 MG/DL — HIGH (ref 70–99)
GLUCOSE BLDC GLUCOMTR-MCNC: 115 MG/DL — HIGH (ref 70–99)
GLUCOSE BLDC GLUCOMTR-MCNC: 131 MG/DL — HIGH (ref 70–99)
GLUCOSE BLDC GLUCOMTR-MCNC: 146 MG/DL — HIGH (ref 70–99)
GLUCOSE SERPL-MCNC: 121 MG/DL — HIGH (ref 70–99)
HCT VFR BLD CALC: 38 % — LOW (ref 39–50)
HGB BLD-MCNC: 12.8 G/DL — LOW (ref 13–17)
MAGNESIUM SERPL-MCNC: 2 MG/DL — SIGNIFICANT CHANGE UP (ref 1.6–2.6)
MCHC RBC-ENTMCNC: 33 PG — SIGNIFICANT CHANGE UP (ref 27–34)
MCHC RBC-ENTMCNC: 33.7 % — SIGNIFICANT CHANGE UP (ref 32–36)
MCV RBC AUTO: 97.9 FL — SIGNIFICANT CHANGE UP (ref 80–100)
NRBC # FLD: 0 — SIGNIFICANT CHANGE UP
PHOSPHATE SERPL-MCNC: 2.8 MG/DL — SIGNIFICANT CHANGE UP (ref 2.5–4.5)
PLATELET # BLD AUTO: 206 K/UL — SIGNIFICANT CHANGE UP (ref 150–400)
PMV BLD: 12.4 FL — SIGNIFICANT CHANGE UP (ref 7–13)
POTASSIUM SERPL-MCNC: 3.4 MMOL/L — LOW (ref 3.5–5.3)
POTASSIUM SERPL-SCNC: 3.4 MMOL/L — LOW (ref 3.5–5.3)
RBC # BLD: 3.88 M/UL — LOW (ref 4.2–5.8)
RBC # FLD: 11.9 % — SIGNIFICANT CHANGE UP (ref 10.3–14.5)
SODIUM SERPL-SCNC: 136 MMOL/L — SIGNIFICANT CHANGE UP (ref 135–145)
SPECIMEN SOURCE: SIGNIFICANT CHANGE UP
TM INTERPRETATION: SIGNIFICANT CHANGE UP
WBC # BLD: 10.59 K/UL — HIGH (ref 3.8–10.5)
WBC # FLD AUTO: 10.59 K/UL — HIGH (ref 3.8–10.5)

## 2018-12-13 PROCEDURE — 99233 SBSQ HOSP IP/OBS HIGH 50: CPT

## 2018-12-13 RX ADMIN — Medication 100 MILLIGRAM(S): at 05:02

## 2018-12-13 RX ADMIN — OXYCODONE HYDROCHLORIDE 10 MILLIGRAM(S): 5 TABLET ORAL at 21:09

## 2018-12-13 RX ADMIN — LOSARTAN POTASSIUM 50 MILLIGRAM(S): 100 TABLET, FILM COATED ORAL at 05:03

## 2018-12-13 RX ADMIN — Medication 100 MILLIGRAM(S): at 19:55

## 2018-12-13 RX ADMIN — Medication 12.5 MILLIGRAM(S): at 05:03

## 2018-12-13 RX ADMIN — Medication 250 MILLIGRAM(S): at 09:03

## 2018-12-13 RX ADMIN — PIPERACILLIN AND TAZOBACTAM 25 GRAM(S): 4; .5 INJECTION, POWDER, LYOPHILIZED, FOR SOLUTION INTRAVENOUS at 14:15

## 2018-12-13 RX ADMIN — OXYCODONE HYDROCHLORIDE 10 MILLIGRAM(S): 5 TABLET ORAL at 21:39

## 2018-12-13 RX ADMIN — PIPERACILLIN AND TAZOBACTAM 25 GRAM(S): 4; .5 INJECTION, POWDER, LYOPHILIZED, FOR SOLUTION INTRAVENOUS at 05:04

## 2018-12-13 RX ADMIN — Medication 250 MILLIGRAM(S): at 19:56

## 2018-12-13 RX ADMIN — PIPERACILLIN AND TAZOBACTAM 25 GRAM(S): 4; .5 INJECTION, POWDER, LYOPHILIZED, FOR SOLUTION INTRAVENOUS at 22:30

## 2018-12-13 RX ADMIN — HEPARIN SODIUM 5000 UNIT(S): 5000 INJECTION INTRAVENOUS; SUBCUTANEOUS at 05:02

## 2018-12-13 RX ADMIN — HEPARIN SODIUM 5000 UNIT(S): 5000 INJECTION INTRAVENOUS; SUBCUTANEOUS at 14:15

## 2018-12-13 NOTE — PROGRESS NOTE ADULT - PROBLEM SELECTOR PLAN 1
-likely in setting of tracheitis/PNA with +trach cultures for MRSA on 12/7, would treat empirically. Await 48 hours of negative BCX prior to D/C Zosyn as patient also at risk for apiration.   -await respiratory culture, await dopplers LE to r/o DVT.

## 2018-12-13 NOTE — PROGRESS NOTE ADULT - SUBJECTIVE AND OBJECTIVE BOX
Pt seen and examined. Tolerating tube feeds. s/p IR bx yesterday.     afeb  NAD, awake and alert   OC/OP: tongue large protruding out of OC w/ interval improvement no bleeding  Neck: 6LPC in place secured with trach tie and sutures x2, cuff deflated, copious secretions suctioned, 6cm conglomerate of left level II/III neck nodes  abdomen: peg in place     cx: gpc, gvr    A/P  68M w/ L tonsillar mass who presented to the ED with SOB with inability to tolerate his secretions s/p DLB.  -f/u fever workup  -discussed with ID, hold abx for now   -bolus tube feeds   -strict NPO by mouth  -pain control prn  -home meds  -routine trach care, trach teaching  -peg teaching  -DM control -FSG, ISS  -will follow-up with his MO/RO team upon discharge  -f/u path  -fu wbc  -fu vanc trough; cont vanc zosyn  -sqh   -PT/OT  -dispo: SW assistance appreciated, pt will need PEG tube for at least 3 months, failed MBS

## 2018-12-13 NOTE — PROGRESS NOTE ADULT - SUBJECTIVE AND OBJECTIVE BOX
Authored by Mason Waterman, DO: Beeper#[phone]    CHIOMA COOL  68y  Male      Patient is a 68y old  Male who presents with a chief complaint of respiratory failure (13 Dec 2018 14:34)      INTERVAL HPI/OVERNIGHT EVENTS: No acute events overnight. no fevers. Patient states secretions as a little improved from yesterday. No  SOB, no chest pain.             T(C): 37.1 (12-13-18 @ 14:00), Max: 37.6 (12-12-18 @ 21:25)  HR: 86 (12-13-18 @ 14:00) (86 - 102)  BP: 119/71 (12-13-18 @ 14:00) (119/71 - 155/83)  RR: 18 (12-13-18 @ 14:00) (17 - 19)  SpO2: 100% (12-13-18 @ 14:00) (95% - 100%)  Wt(kg): --Vital Signs Last 24 Hrs  T(C): 37.1 (13 Dec 2018 14:00), Max: 37.6 (12 Dec 2018 21:25)  T(F): 98.7 (13 Dec 2018 14:00), Max: 99.6 (12 Dec 2018 21:25)  HR: 86 (13 Dec 2018 14:00) (86 - 102)  BP: 119/71 (13 Dec 2018 14:00) (119/71 - 155/83)  BP(mean): --  RR: 18 (13 Dec 2018 14:00) (17 - 19)  SpO2: 100% (13 Dec 2018 14:00) (95% - 100%)    PHYSICAL EXAM:  GENERAL: NAD, well-groomed, well-developed  HEENT: Tongue protrusion. Trach in place.   CHEST/LUNG: Clear to percussion bilaterally; No rales, rhonchi, wheezing, or rubs  HEART: Regular rate and rhythm; No murmurs, rubs, or gallops  ABDOMEN: PEG in place, c/d/i. No abdominal tenderness.   EXTREMITIES:  2+ Peripheral Pulses, No clubbing, cyanosis, or edema  SKIN: No rashes or lesions  PSYCH: Alert & Oriented x3  NERVOUS SYSTEM:  ; Motor Strength 5/5 B/L upper and lower extremities.  Sensory intact    Consultant(s) Notes Reviewed:  [x ] YES  [ ] NO  Care Discussed with Consultants/Other Providers [ x] YES  [ ] NO: ENT    LABS:                        12.8   10.59 )-----------( 206      ( 13 Dec 2018 06:45 )             38.0     12-13    136  |  95<L>  |  11  ----------------------------<  121<H>  3.4<L>   |  27  |  0.90    Ca    9.2      13 Dec 2018 06:45  Phos  2.8     12-13  Mg     2.0     12-13          CAPILLARY BLOOD GLUCOSE      POCT Blood Glucose.: 115 mg/dL (13 Dec 2018 17:22)  POCT Blood Glucose.: 146 mg/dL (13 Dec 2018 12:36)  POCT Blood Glucose.: 115 mg/dL (13 Dec 2018 06:47)  POCT Blood Glucose.: 141 mg/dL (12 Dec 2018 23:47)  POCT Blood Glucose.: 128 mg/dL (12 Dec 2018 18:05)        Culture - Respiratory with Gram Stain (12.12.18 @ 10:50)    Culture - Respiratory:   CULTURE IN PROGRESS, FURTHER REPORT TO FOLLOW.    Gram Stain Sputum:   GPCPR^Gram Pos Cocci in Pairs  QUANTITY OF BACTERIA SEEN: MANY (4+)  GVR^GRAM VARIABLE RODS  QUANTITY OF BACTERIA SEEN: MANY (4+)  WBC^White Blood Cells  QNTY CELLS IN GRAM STAIN: MANY (4+)    Specimen Source: ENDOTRACHEAL SPECIMEN        RADIOLOGY & ADDITIONAL TESTS:    Imaging Personally Reviewed:  [ ] YES  [ ] NO

## 2018-12-14 ENCOUNTER — TRANSCRIPTION ENCOUNTER (OUTPATIENT)
Age: 68
End: 2018-12-14

## 2018-12-14 LAB
BACTERIA SPT RESP CULT: SIGNIFICANT CHANGE UP
BUN SERPL-MCNC: 11 MG/DL — SIGNIFICANT CHANGE UP (ref 7–23)
CALCIUM SERPL-MCNC: 9.2 MG/DL — SIGNIFICANT CHANGE UP (ref 8.4–10.5)
CHLORIDE SERPL-SCNC: 95 MMOL/L — LOW (ref 98–107)
CO2 SERPL-SCNC: 26 MMOL/L — SIGNIFICANT CHANGE UP (ref 22–31)
CREAT SERPL-MCNC: 1 MG/DL — SIGNIFICANT CHANGE UP (ref 0.5–1.3)
GLUCOSE BLDC GLUCOMTR-MCNC: 104 MG/DL — HIGH (ref 70–99)
GLUCOSE BLDC GLUCOMTR-MCNC: 108 MG/DL — HIGH (ref 70–99)
GLUCOSE BLDC GLUCOMTR-MCNC: 117 MG/DL — HIGH (ref 70–99)
GLUCOSE BLDC GLUCOMTR-MCNC: 129 MG/DL — HIGH (ref 70–99)
GLUCOSE SERPL-MCNC: 113 MG/DL — HIGH (ref 70–99)
HCT VFR BLD CALC: 35.7 % — LOW (ref 39–50)
HGB BLD-MCNC: 12.5 G/DL — LOW (ref 13–17)
MAGNESIUM SERPL-MCNC: 2.1 MG/DL — SIGNIFICANT CHANGE UP (ref 1.6–2.6)
MCHC RBC-ENTMCNC: 34 PG — SIGNIFICANT CHANGE UP (ref 27–34)
MCHC RBC-ENTMCNC: 35 % — SIGNIFICANT CHANGE UP (ref 32–36)
MCV RBC AUTO: 97 FL — SIGNIFICANT CHANGE UP (ref 80–100)
NRBC # FLD: 0 — SIGNIFICANT CHANGE UP
PHOSPHATE SERPL-MCNC: 3.4 MG/DL — SIGNIFICANT CHANGE UP (ref 2.5–4.5)
PLATELET # BLD AUTO: 200 K/UL — SIGNIFICANT CHANGE UP (ref 150–400)
PMV BLD: 11.4 FL — SIGNIFICANT CHANGE UP (ref 7–13)
POTASSIUM SERPL-MCNC: 3.6 MMOL/L — SIGNIFICANT CHANGE UP (ref 3.5–5.3)
POTASSIUM SERPL-SCNC: 3.6 MMOL/L — SIGNIFICANT CHANGE UP (ref 3.5–5.3)
RBC # BLD: 3.68 M/UL — LOW (ref 4.2–5.8)
RBC # FLD: 11.7 % — SIGNIFICANT CHANGE UP (ref 10.3–14.5)
SODIUM SERPL-SCNC: 135 MMOL/L — SIGNIFICANT CHANGE UP (ref 135–145)
VANCOMYCIN TROUGH SERPL-MCNC: 7.7 UG/ML — LOW (ref 10–20)
WBC # BLD: 8.82 K/UL — SIGNIFICANT CHANGE UP (ref 3.8–10.5)
WBC # FLD AUTO: 8.82 K/UL — SIGNIFICANT CHANGE UP (ref 3.8–10.5)

## 2018-12-14 PROCEDURE — 93970 EXTREMITY STUDY: CPT | Mod: 26

## 2018-12-14 PROCEDURE — 99233 SBSQ HOSP IP/OBS HIGH 50: CPT | Mod: GC

## 2018-12-14 PROCEDURE — 99232 SBSQ HOSP IP/OBS MODERATE 35: CPT | Mod: GC

## 2018-12-14 PROCEDURE — 99233 SBSQ HOSP IP/OBS HIGH 50: CPT

## 2018-12-14 RX ORDER — VANCOMYCIN HCL 1 G
1250 VIAL (EA) INTRAVENOUS EVERY 12 HOURS
Qty: 0 | Refills: 0 | Status: DISCONTINUED | OUTPATIENT
Start: 2018-12-14 | End: 2018-12-19

## 2018-12-14 RX ORDER — POTASSIUM CHLORIDE 20 MEQ
40 PACKET (EA) ORAL ONCE
Qty: 0 | Refills: 0 | Status: COMPLETED | OUTPATIENT
Start: 2018-12-14 | End: 2018-12-14

## 2018-12-14 RX ADMIN — OXYCODONE HYDROCHLORIDE 10 MILLIGRAM(S): 5 TABLET ORAL at 22:51

## 2018-12-14 RX ADMIN — Medication 166.67 MILLIGRAM(S): at 18:46

## 2018-12-14 RX ADMIN — HEPARIN SODIUM 5000 UNIT(S): 5000 INJECTION INTRAVENOUS; SUBCUTANEOUS at 06:37

## 2018-12-14 RX ADMIN — OXYCODONE HYDROCHLORIDE 10 MILLIGRAM(S): 5 TABLET ORAL at 04:20

## 2018-12-14 RX ADMIN — Medication 100 MILLIGRAM(S): at 06:37

## 2018-12-14 RX ADMIN — OXYCODONE HYDROCHLORIDE 10 MILLIGRAM(S): 5 TABLET ORAL at 03:50

## 2018-12-14 RX ADMIN — PIPERACILLIN AND TAZOBACTAM 25 GRAM(S): 4; .5 INJECTION, POWDER, LYOPHILIZED, FOR SOLUTION INTRAVENOUS at 10:57

## 2018-12-14 RX ADMIN — LOSARTAN POTASSIUM 50 MILLIGRAM(S): 100 TABLET, FILM COATED ORAL at 06:37

## 2018-12-14 RX ADMIN — PIPERACILLIN AND TAZOBACTAM 25 GRAM(S): 4; .5 INJECTION, POWDER, LYOPHILIZED, FOR SOLUTION INTRAVENOUS at 19:32

## 2018-12-14 RX ADMIN — Medication 650 MILLIGRAM(S): at 14:06

## 2018-12-14 RX ADMIN — Medication 12.5 MILLIGRAM(S): at 06:37

## 2018-12-14 RX ADMIN — OXYCODONE HYDROCHLORIDE 10 MILLIGRAM(S): 5 TABLET ORAL at 11:29

## 2018-12-14 RX ADMIN — OXYCODONE HYDROCHLORIDE 10 MILLIGRAM(S): 5 TABLET ORAL at 11:56

## 2018-12-14 RX ADMIN — Medication 100 MILLIGRAM(S): at 18:44

## 2018-12-14 RX ADMIN — Medication 650 MILLIGRAM(S): at 13:46

## 2018-12-14 RX ADMIN — Medication 40 MILLIEQUIVALENT(S): at 11:28

## 2018-12-14 RX ADMIN — OXYCODONE HYDROCHLORIDE 10 MILLIGRAM(S): 5 TABLET ORAL at 22:21

## 2018-12-14 RX ADMIN — HEPARIN SODIUM 5000 UNIT(S): 5000 INJECTION INTRAVENOUS; SUBCUTANEOUS at 13:47

## 2018-12-14 RX ADMIN — Medication 166.67 MILLIGRAM(S): at 08:24

## 2018-12-14 RX ADMIN — Medication 81 MILLIGRAM(S): at 11:29

## 2018-12-14 NOTE — PROGRESS NOTE ADULT - SUBJECTIVE AND OBJECTIVE BOX
Authored by Mason Waterman, DO: Beeper#[phone]    CHIOMA COOL  68y  Male      Patient is a 68y old  Male who presents with a chief complaint of respiratory failure (13 Dec 2018 14:34)      INTERVAL HPI/OVERNIGHT EVENTS: No acute events overnight. no fevers. Patient offers no complaints, wants to walk around hallway.        Vital Signs Last 24 Hrs  T(C): 36.4 (14 Dec 2018 14:40), Max: 37.1 (13 Dec 2018 17:44)  T(F): 97.5 (14 Dec 2018 14:40), Max: 98.7 (13 Dec 2018 17:44)  HR: 83 (14 Dec 2018 14:40) (67 - 95)  BP: 121/84 (14 Dec 2018 14:40) (102/60 - 135/82)  BP(mean): --  RR: 18 (14 Dec 2018 14:40) (18 - 18)  SpO2: 97% (14 Dec 2018 14:40) (96% - 100%)    PHYSICAL EXAM:  GENERAL: NAD, well-groomed, well-developed  HEENT: Tongue protrusion. Trach in place.   CHEST/LUNG: Clear to percussion bilaterally; No rales, rhonchi, wheezing, or rubs  HEART: Regular rate and rhythm; No murmurs, rubs, or gallops  ABDOMEN: PEG in place, c/d/i. No abdominal tenderness.   EXTREMITIES:  2+ Peripheral Pulses, No clubbing, cyanosis, or edema  SKIN: No rashes or lesions  PSYCH: Alert & Oriented x3  NERVOUS SYSTEM:  ; Motor Strength 5/5 B/L upper and lower extremities.  Sensory intact    Consultant(s) Notes Reviewed:  [x ] YES  [ ] NO  Care Discussed with Consultants/Other Providers [ x] YES  [ ] NO: ENT    LABS:                          12.5   8.82  )-----------( 200      ( 14 Dec 2018 05:20 )             35.7   12-14    135  |  95<L>  |  11  ----------------------------<  113<H>  3.6   |  26  |  1.00    Ca    9.2      14 Dec 2018 05:20  Phos  3.4     12-14  Mg     2.1     12-14        CAPILLARY BLOOD GLUCOSE      POCT Blood Glucose.: 117 mg/dL (14 Dec 2018 11:47)  POCT Blood Glucose.: 108 mg/dL (14 Dec 2018 07:10)  POCT Blood Glucose.: 131 mg/dL (13 Dec 2018 23:52)  POCT Blood Glucose.: 115 mg/dL (13 Dec 2018 17:22)      Culture - Respiratory with Gram Stain (12.12.18 @ 10:50)    Culture - Respiratory:   CULTURE IN PROGRESS, FURTHER REPORT TO FOLLOW.    Gram Stain Sputum:   GPCPR^Gram Pos Cocci in Pairs  QUANTITY OF BACTERIA SEEN: MANY (4+)  GVR^GRAM VARIABLE RODS  QUANTITY OF BACTERIA SEEN: MANY (4+)  WBC^White Blood Cells  QNTY CELLS IN GRAM STAIN: MANY (4+)    Specimen Source: ENDOTRACHEAL SPECIMEN        RADIOLOGY & ADDITIONAL TESTS:    Imaging Personally Reviewed:  [ ] YES  [ ] NO

## 2018-12-14 NOTE — PROGRESS NOTE ADULT - ASSESSMENT
69 yo male PMHx HTN, diet controlled DM type 2, asthma recent tonsillar mass Bx in clinic admitted for airway protection s/p trach with Biopsy negative for malignancy with increasing LAD and now fevers, consulted for medical management and fever workup.

## 2018-12-14 NOTE — PROGRESS NOTE ADULT - PROBLEM SELECTOR PLAN 1
-likely in setting of tracheitis/PNA with +trach cultures for MRSA on 12/7, would treat empirically.   -patient also at risk for aspiration, would empirically cover for aspiration PNA for 5 days (today day 3/5) and vanc for 7 days for MRS PNA  -agree with increase vanc to 1250 given low troph, recheck tomorrow.

## 2018-12-14 NOTE — PROGRESS NOTE ADULT - PROBLEM SELECTOR PLAN 2
management as per ENT.   -cytology with atypical B cells, awaiting final bx results.   -recommend hematology consult as sooner have a conclusive diagnosis the sooner can ctreat and given atypical B cells concern for lypmhoma process.

## 2018-12-14 NOTE — PROGRESS NOTE ADULT - SUBJECTIVE AND OBJECTIVE BOX
Pt seen and examined. Tolerating tube feeds. Abdominal pain improved.     afeb  NAD, awake and alert   OC/OP: tongue large protruding out of OC w/ interval improvement no bleeding  Neck: 6LPC in place secured with trach tie and sutures x2, cuff deflated, copious secretions suctioned, 6cm conglomerate of left level II/III neck nodes  abdomen: peg in place     cx: gpc, gvr    A/P  68M w/ L tonsillar mass who presented to the ED with SOB with inability to tolerate his secretions s/p DLB.  -f/u fever workup  -discussed with ID, hold abx for now   -bolus tube feeds   -strict NPO by mouth  -pain control prn  -home meds  -routine trach care, trach teaching  -peg teaching  -DM control -FSG, ISS  -will follow-up with his MO/RO team upon discharge  -f/u path  -fu wbc  -fu vanc trough; cont vanc zosyn  -sqh   -PT/OT  -dispo: SW assistance appreciated, pt will need PEG tube for at least 3 months, failed MBS

## 2018-12-14 NOTE — CONSULT NOTE ADULT - ASSESSMENT
68M w/ h/o asthma, htn, DM (diet controlled) and recently diagnosed left sided tonsillar mass s/p non-diagnostic tonsil biopsy in the office 11/13/18 who presented to the ED with SOB s/p tracheostomy and pathology negative for squamous cell carcinoma however the flow is concerning for possible lymphoma (monotypic large B cells seen)     1. R/o malilgnancy  -discussed with ENT resident, patient has clinical findings concerning for Head and Neck Squamous Cell Carcinoma, however the flow is concerning for a possible Diffuse Large B-Cell Lymphoma, additional tissue is needed to make diagnosis  -if possible please perform excisional lymph node biopsy or additional core biopsy, DLBCL can be difficult to diagnose with FNA, SCC is still thought to be a possibility  -check LDH, B2 microglobulin, uric acid  -due to the anticipated need for inpatient chemotherapy for controlling the obstructive symptoms, a TTE and hepatitis serologies are ordered    Please call hematology if any questions/concerns, will follow up when pathology from additional tissue is available    Genaro Mary  PGY-6, Hematology-Oncology Fellow  [phone] ([place]) [phone] ([facility]) 68M w/ h/o asthma, htn, DM (diet controlled) and recently diagnosed left sided tonsillar mass s/p non-diagnostic tonsil biopsy in the office 11/13/18 who presented to the ED with SOB s/p tracheostomy and pathology negative for squamous cell carcinoma however the flow is concerning for possible lymphoma (monotypic large B cells seen)     1. R/o malilgnancy  -discussed with ENT resident, patient has clinical findings concerning for Head and Neck Squamous Cell Carcinoma, however the flow is concerning for a possible Diffuse Large B-Cell Lymphoma, additional tissue is needed to make diagnosis, it is also possible the flow is picking up lymphocytes from inflammation and the underlying is still a squamous cell carcinoma   -if possible please perform excisional lymph node biopsy or additional core biopsy, DLBCL can be difficult to diagnose with FNA, SCC is still thought to be a possibility  -check LDH, B2 microglobulin, uric acid  -due to the anticipated need for inpatient chemotherapy for controlling the obstructive symptoms, a TTE and hepatitis serologies are ordered    Please call hematology if any questions/concerns, will follow up when pathology from additional tissue is available    Genaro Mary  PGY-6, Hematology-Oncology Fellow  [phone] ([place]) [phone] ([facility])

## 2018-12-14 NOTE — PROGRESS NOTE ADULT - SUBJECTIVE AND OBJECTIVE BOX
Patient is a 68y old  Male who presents with a chief complaint of respiratory failure (14 Dec 2018 16:48)      HPI:  68M w/ h/o asthma, htn, DM (diet controlled) and recently diagnosed left sided tonsillar mass s/p non-diagnostic tonsil biopsy in the office 11/13/18 who presented to the ED with SOB. At that time flexible laryngoscopy showed patent airway but inability to tolerate secretions with pooling of secretions in the hypopharynx and suspected aspiration. Awake nasal fiberoptic intubation was attempted by the anesthesia team in the ED however without success and the patient was then brought urgently to the OR for fiberoptic intubation, tracheotomy, direct laryngoscopy, biopsy. Patient did not desat lower than 89% and remained HD stable throughout. Patient is full code. Patient is already followed by MO and RO as an outpatient and was originally scheduled for a DLB, esophagoscopy tomorrow. (04 Dec 2018 22:06)      Interval Events: Trach done.  Initial biopsies of pharyngeal mass all negative for CA.  Guided needle biopsy done 12/12, and is suspicious, but not definitive for diffuse B Cell lymphoma.    REVIEW OF SYSTEMS:    CONSTITUTIONAL: + weakness. No fevers or chills  EYES/ENT: No visual changes;  + throat discomfort  NECK: + pain  RESPIRATORY: No cough, wheezing, hemoptysis; No shortness of breath  CARDIOVASCULAR: No chest pain or palpitations  GASTROINTESTINAL: Unable to swallow.  No abdominal or epigastric pain. No nausea, vomiting, or hematemesis; No diarrhea or constipation. No melena or hematochezia.  GENITOURINARY: No dysuria, frequency or hematuria  NEUROLOGICAL: No numbness.  SKIN: No itching, burning, rashes, or lesions   All other review of systems is negative unless indicated above.    MEDICATIONS  (STANDING):  aspirin  chewable 81 milliGRAM(s) Oral daily  dextrose 5%. 1000 milliLiter(s) (50 mL/Hr) IV Continuous <Continuous>  dextrose 50% Injectable 12.5 Gram(s) IV Push once  dextrose 50% Injectable 25 Gram(s) IV Push once  dextrose 50% Injectable 25 Gram(s) IV Push once  docusate sodium Liquid 100 milliGRAM(s) Oral two times a day  heparin  Injectable 5000 Unit(s) SubCutaneous every 8 hours  hydrochlorothiazide 12.5 milliGRAM(s) Oral daily  insulin lispro (HumaLOG) corrective regimen sliding scale   SubCutaneous every 6 hours  losartan 50 milliGRAM(s) Oral daily  piperacillin/tazobactam IVPB. 3.375 Gram(s) IV Intermittent every 8 hours  senna Syrup 10 milliLiter(s) Oral at bedtime  vancomycin  IVPB 1250 milliGRAM(s) IV Intermittent every 12 hours    MEDICATIONS  (PRN):  acetaminophen    Suspension .. 650 milliGRAM(s) Oral every 6 hours PRN Mild Pain (1 - 3)  dextrose 40% Gel 15 Gram(s) Oral once PRN Blood Glucose LESS THAN 70 milliGRAM(s)/deciliter  glucagon  Injectable 1 milliGRAM(s) IntraMuscular once PRN Glucose LESS THAN 70 milligrams/deciliter  oxyCODONE    Solution 5 milliGRAM(s) Oral every 6 hours PRN Moderate Pain (4 - 6)  oxyCODONE    Solution 10 milliGRAM(s) Oral every 6 hours PRN Severe Pain (7 - 10)                            12.5   8.82  )-----------( 200      ( 14 Dec 2018 05:20 )             35.7     12-14    135  |  95<L>  |  11  ----------------------------<  113<H>  3.6   |  26  |  1.00    Ca    9.2      14 Dec 2018 05:20  Phos  3.4     12-14  Mg     2.1     12-14      I&O's Detail    13 Dec 2018 07:01  -  14 Dec 2018 07:00  --------------------------------------------------------  IN:    Free Water: 250 mL    Glucerna: 350 mL  Total IN: 600 mL    OUT:    Voided: 950 mL  Total OUT: 950 mL    Total NET: -350 mL      14 Dec 2018 07:01  -  14 Dec 2018 18:47  --------------------------------------------------------  IN:    Free Water: 500 mL    Glucerna: 700 mL    IV PiggyBack: 600 mL  Total IN: 1800 mL    OUT:    Voided: 200 mL  Total OUT: 200 mL    Total NET: 1600 mL        Vital Signs Last 24 Hrs  T(C): 36.8 (14 Dec 2018 18:29), Max: 36.8 (14 Dec 2018 18:29)  T(F): 98.3 (14 Dec 2018 18:29), Max: 98.3 (14 Dec 2018 18:29)  HR: 94 (14 Dec 2018 18:29) (71 - 95)  BP: 116/71 (14 Dec 2018 18:29) (116/71 - 135/82)  BP(mean): --  RR: 18 (14 Dec 2018 18:29) (18 - 18)  SpO2: 100% (14 Dec 2018 18:29) (96% - 100%)    CBC Full  -  ( 14 Dec 2018 05:20 )  WBC Count : 8.82 K/uL  Hemoglobin : 12.5 g/dL  Hematocrit : 35.7 %  Platelet Count - Automated : 200 K/uL  Mean Cell Volume : 97.0 fL  Mean Cell Hemoglobin : 34.0 pg  Mean Cell Hemoglobin Concentration : 35.0 %  Auto Neutrophil # : x  Auto Lymphocyte # : x  Auto Monocyte # : x  Auto Eosinophil # : x  Auto Basophil # : x  Auto Neutrophil % : x  Auto Lymphocyte % : x  Auto Monocyte % : x  Auto Eosinophil % : x  Auto Basophil % : x      PHYSICAL EXAM:  Constitutional: A & O x 3    Psychiatric: age appropriate behavior, cooperative    Skin: no obvious skin lesions    Lymphatic: Bulky lymphadenopathy L neck    Ears/Nose: WNL  					  Oral Cavity:  Good dentition, tongue midline, no lesions or ulcerations    Tonsilar Size: L tonsil mass without change    Tracheostomy Site: Normal with no evidence of breakdown or excoriation  	Tracheostomy size and type:    Pulmonary: No Acute Distress.     Neurologic: awake and alert

## 2018-12-15 ENCOUNTER — RESULT REVIEW (OUTPATIENT)
Age: 68
End: 2018-12-15

## 2018-12-15 LAB
BUN SERPL-MCNC: 11 MG/DL — SIGNIFICANT CHANGE UP (ref 7–23)
CALCIUM SERPL-MCNC: 9.5 MG/DL — SIGNIFICANT CHANGE UP (ref 8.4–10.5)
CHLORIDE SERPL-SCNC: 97 MMOL/L — LOW (ref 98–107)
CO2 SERPL-SCNC: 24 MMOL/L — SIGNIFICANT CHANGE UP (ref 22–31)
CREAT SERPL-MCNC: 1.11 MG/DL — SIGNIFICANT CHANGE UP (ref 0.5–1.3)
GLUCOSE BLDC GLUCOMTR-MCNC: 111 MG/DL — HIGH (ref 70–99)
GLUCOSE BLDC GLUCOMTR-MCNC: 115 MG/DL — HIGH (ref 70–99)
GLUCOSE SERPL-MCNC: 114 MG/DL — HIGH (ref 70–99)
HBV CORE AB SER-ACNC: NONREACTIVE — SIGNIFICANT CHANGE UP
HBV SURFACE AB SER-ACNC: NONREACTIVE — SIGNIFICANT CHANGE UP
HBV SURFACE AG SER-ACNC: NEGATIVE — SIGNIFICANT CHANGE UP
HCT VFR BLD CALC: 37.9 % — LOW (ref 39–50)
HCV AB S/CO SERPL IA: 11.88 S/CO — SIGNIFICANT CHANGE UP
HCV AB SERPL-IMP: REACTIVE — SIGNIFICANT CHANGE UP
HGB BLD-MCNC: 13.1 G/DL — SIGNIFICANT CHANGE UP (ref 13–17)
LDH SERPL L TO P-CCNC: 272 U/L — HIGH (ref 135–225)
MCHC RBC-ENTMCNC: 33.8 PG — SIGNIFICANT CHANGE UP (ref 27–34)
MCHC RBC-ENTMCNC: 34.6 % — SIGNIFICANT CHANGE UP (ref 32–36)
MCV RBC AUTO: 97.7 FL — SIGNIFICANT CHANGE UP (ref 80–100)
NRBC # FLD: 0 — SIGNIFICANT CHANGE UP
PLATELET # BLD AUTO: 267 K/UL — SIGNIFICANT CHANGE UP (ref 150–400)
PMV BLD: 11.3 FL — SIGNIFICANT CHANGE UP (ref 7–13)
POTASSIUM SERPL-MCNC: 4.1 MMOL/L — SIGNIFICANT CHANGE UP (ref 3.5–5.3)
POTASSIUM SERPL-SCNC: 4.1 MMOL/L — SIGNIFICANT CHANGE UP (ref 3.5–5.3)
RBC # BLD: 3.88 M/UL — LOW (ref 4.2–5.8)
RBC # FLD: 11.8 % — SIGNIFICANT CHANGE UP (ref 10.3–14.5)
SODIUM SERPL-SCNC: 137 MMOL/L — SIGNIFICANT CHANGE UP (ref 135–145)
URATE SERPL-MCNC: 3.5 MG/DL — SIGNIFICANT CHANGE UP (ref 3.4–8.8)
WBC # BLD: 7.33 K/UL — SIGNIFICANT CHANGE UP (ref 3.8–10.5)
WBC # FLD AUTO: 7.33 K/UL — SIGNIFICANT CHANGE UP (ref 3.8–10.5)

## 2018-12-15 PROCEDURE — 88334 PATH CONSLTJ SURG CYTO XM EA: CPT | Mod: 26,59

## 2018-12-15 PROCEDURE — 88305 TISSUE EXAM BY PATHOLOGIST: CPT | Mod: 26

## 2018-12-15 PROCEDURE — 88367 INSITU HYBRIDIZATION AUTO: CPT | Mod: 26

## 2018-12-15 PROCEDURE — 88365 INSITU HYBRIDIZATION (FISH): CPT | Mod: 26,59

## 2018-12-15 PROCEDURE — 88342 IMHCHEM/IMCYTCHM 1ST ANTB: CPT | Mod: 26

## 2018-12-15 PROCEDURE — 99233 SBSQ HOSP IP/OBS HIGH 50: CPT

## 2018-12-15 PROCEDURE — 88331 PATH CONSLTJ SURG 1 BLK 1SPC: CPT | Mod: 26

## 2018-12-15 PROCEDURE — 38510 BIOPSY/REMOVAL LYMPH NODES: CPT | Mod: GC,LT

## 2018-12-15 PROCEDURE — 88332 PATH CONSLTJ SURG EA ADD BLK: CPT | Mod: 26

## 2018-12-15 PROCEDURE — 88341 IMHCHEM/IMCYTCHM EA ADD ANTB: CPT | Mod: 26

## 2018-12-15 RX ORDER — FENTANYL CITRATE 50 UG/ML
25 INJECTION INTRAVENOUS
Qty: 0 | Refills: 0 | Status: DISCONTINUED | OUTPATIENT
Start: 2018-12-15 | End: 2018-12-15

## 2018-12-15 RX ORDER — ONDANSETRON 8 MG/1
4 TABLET, FILM COATED ORAL ONCE
Qty: 0 | Refills: 0 | Status: DISCONTINUED | OUTPATIENT
Start: 2018-12-15 | End: 2018-12-15

## 2018-12-15 RX ORDER — FENTANYL CITRATE 50 UG/ML
50 INJECTION INTRAVENOUS
Qty: 0 | Refills: 0 | Status: DISCONTINUED | OUTPATIENT
Start: 2018-12-15 | End: 2018-12-15

## 2018-12-15 RX ORDER — MORPHINE SULFATE 50 MG/1
4 CAPSULE, EXTENDED RELEASE ORAL EVERY 4 HOURS
Qty: 0 | Refills: 0 | Status: DISCONTINUED | OUTPATIENT
Start: 2018-12-15 | End: 2018-12-17

## 2018-12-15 RX ORDER — SODIUM CHLORIDE 9 MG/ML
1000 INJECTION, SOLUTION INTRAVENOUS
Qty: 0 | Refills: 0 | Status: DISCONTINUED | OUTPATIENT
Start: 2018-12-15 | End: 2018-12-16

## 2018-12-15 RX ADMIN — MORPHINE SULFATE 4 MILLIGRAM(S): 50 CAPSULE, EXTENDED RELEASE ORAL at 21:05

## 2018-12-15 RX ADMIN — SODIUM CHLORIDE 75 MILLILITER(S): 9 INJECTION, SOLUTION INTRAVENOUS at 16:38

## 2018-12-15 RX ADMIN — OXYCODONE HYDROCHLORIDE 10 MILLIGRAM(S): 5 TABLET ORAL at 12:21

## 2018-12-15 RX ADMIN — Medication 166.67 MILLIGRAM(S): at 08:41

## 2018-12-15 RX ADMIN — Medication 166.67 MILLIGRAM(S): at 19:09

## 2018-12-15 RX ADMIN — PIPERACILLIN AND TAZOBACTAM 25 GRAM(S): 4; .5 INJECTION, POWDER, LYOPHILIZED, FOR SOLUTION INTRAVENOUS at 18:30

## 2018-12-15 RX ADMIN — Medication 100 MILLIGRAM(S): at 18:31

## 2018-12-15 RX ADMIN — PIPERACILLIN AND TAZOBACTAM 25 GRAM(S): 4; .5 INJECTION, POWDER, LYOPHILIZED, FOR SOLUTION INTRAVENOUS at 02:43

## 2018-12-15 RX ADMIN — PIPERACILLIN AND TAZOBACTAM 25 GRAM(S): 4; .5 INJECTION, POWDER, LYOPHILIZED, FOR SOLUTION INTRAVENOUS at 10:23

## 2018-12-15 RX ADMIN — MORPHINE SULFATE 4 MILLIGRAM(S): 50 CAPSULE, EXTENDED RELEASE ORAL at 20:35

## 2018-12-15 RX ADMIN — FENTANYL CITRATE 50 MICROGRAM(S): 50 INJECTION INTRAVENOUS at 16:58

## 2018-12-15 RX ADMIN — OXYCODONE HYDROCHLORIDE 10 MILLIGRAM(S): 5 TABLET ORAL at 10:59

## 2018-12-15 RX ADMIN — HEPARIN SODIUM 5000 UNIT(S): 5000 INJECTION INTRAVENOUS; SUBCUTANEOUS at 23:03

## 2018-12-15 RX ADMIN — FENTANYL CITRATE 50 MICROGRAM(S): 50 INJECTION INTRAVENOUS at 16:36

## 2018-12-15 NOTE — PROGRESS NOTE ADULT - ASSESSMENT
69 yo male PMHx HTN, diet controlled DM type 2, asthma, recent tonsillar mass Bx in clinic admitted for airway protection s/p trach with Biopsy negative for SCC malignancy, with increasing LAD and now fevers, consulted for medical management and fever workup. On antibiotics, planned for further lymph node biopsy to establish diagnosis of malignancy

## 2018-12-15 NOTE — PROGRESS NOTE ADULT - PROBLEM SELECTOR PLAN 2
- L tonsillar mass, management as per ENT.   - cytology with atypical B cells, awaiting final bx results.  - appreciate heme/onc input, still concern for head and neck SCC (though negative thus far), flow cytometry also concerning for DLBCL, plan for OR for lymph node biopsy for further evaluation  - further treatment options to be determined

## 2018-12-15 NOTE — PROGRESS NOTE ADULT - SUBJECTIVE AND OBJECTIVE BOX
[facility] Medicine  Pager [phone]    Patient is a 68y old  Male who presents with a chief complaint of respiratory failure (15 Dec 2018 11:00)      INTERVAL HPI/OVERNIGHT EVENTS:    MEDICATIONS  (STANDING):  aspirin  chewable 81 milliGRAM(s) Oral daily  dextrose 5%. 1000 milliLiter(s) (50 mL/Hr) IV Continuous <Continuous>  dextrose 50% Injectable 12.5 Gram(s) IV Push once  dextrose 50% Injectable 25 Gram(s) IV Push once  dextrose 50% Injectable 25 Gram(s) IV Push once  docusate sodium Liquid 100 milliGRAM(s) Oral two times a day  heparin  Injectable 5000 Unit(s) SubCutaneous every 8 hours  hydrochlorothiazide 12.5 milliGRAM(s) Oral daily  insulin lispro (HumaLOG) corrective regimen sliding scale   SubCutaneous every 6 hours  lactated ringers. 1000 milliLiter(s) (75 mL/Hr) IV Continuous <Continuous>  losartan 50 milliGRAM(s) Oral daily  piperacillin/tazobactam IVPB. 3.375 Gram(s) IV Intermittent every 8 hours  senna Syrup 10 milliLiter(s) Oral at bedtime  vancomycin  IVPB 1250 milliGRAM(s) IV Intermittent every 12 hours    MEDICATIONS  (PRN):  acetaminophen    Suspension .. 650 milliGRAM(s) Oral every 6 hours PRN Mild Pain (1 - 3)  dextrose 40% Gel 15 Gram(s) Oral once PRN Blood Glucose LESS THAN 70 milliGRAM(s)/deciliter  glucagon  Injectable 1 milliGRAM(s) IntraMuscular once PRN Glucose LESS THAN 70 milligrams/deciliter  morphine  - Injectable 4 milliGRAM(s) IV Push every 4 hours PRN Severe Pain (7 - 10)  oxyCODONE    Solution 5 milliGRAM(s) Oral every 6 hours PRN Moderate Pain (4 - 6)  oxyCODONE    Solution 10 milliGRAM(s) Oral every 6 hours PRN Severe Pain (7 - 10)      Allergies    No Known Allergies    Intolerances        REVIEW OF SYSTEMS:  Please see interval HPI:    Vital Signs Last 24 Hrs  T(C): 37.2 (15 Dec 2018 10:44), Max: 37.2 (15 Dec 2018 10:44)  T(F): 98.9 (15 Dec 2018 10:44), Max: 98.9 (15 Dec 2018 10:44)  HR: 96 (15 Dec 2018 10:44) (78 - 96)  BP: 140/79 (15 Dec 2018 10:44) (114/74 - 152/79)  BP(mean): --  RR: 19 (15 Dec 2018 10:44) (18 - 19)  SpO2: 97% (15 Dec 2018 10:44) (97% - 100%)  I&O's Detail    14 Dec 2018 07:01  -  15 Dec 2018 07:00  --------------------------------------------------------  IN:    Free Water: 750 mL    Glucerna: 1050 mL    IV PiggyBack: 600 mL  Total IN: 2400 mL    OUT:    Voided: 500 mL  Total OUT: 500 mL    Total NET: 1900 mL      15 Dec 2018 07:01  -  15 Dec 2018 13:29  --------------------------------------------------------  IN:    Free Water: 30 mL    IV PiggyBack: 100 mL    lactated ringers.: 300 mL  Total IN: 430 mL    OUT:  Total OUT: 0 mL    Total NET: 430 mL            PHYSICAL EXAM:  GENERAL:   HEAD:    EYES:   ENMT:   NECK:   NERVOUS SYSTEM:    CHEST/LUNG:   HEART:   ABDOMEN:   EXTREMITIES:    LYMPH:   SKIN:     LABS:                        13.1   7.33  )-----------( 267      ( 15 Dec 2018 06:05 )             37.9     15 Dec 2018 06:05    137    |  97     |  11     ----------------------------<  114    4.1     |  24     |  1.11     Ca    9.5        15 Dec 2018 06:05        CAPILLARY BLOOD GLUCOSE      POCT Blood Glucose.: 111 mg/dL (15 Dec 2018 12:02)  POCT Blood Glucose.: 111 mg/dL (15 Dec 2018 08:37)  POCT Blood Glucose.: 111 mg/dL (15 Dec 2018 06:09)  POCT Blood Glucose.: 129 mg/dL (14 Dec 2018 23:23)  POCT Blood Glucose.: 104 mg/dL (14 Dec 2018 17:39)    BLOOD CULTURE  12-12 @ 09:58 --    NO ORGANISMS ISOLATED  NO ORGANISMS ISOLATED AT 72 HRS.  --    RADIOLOGY & ADDITIONAL TESTS:    Imaging Personally Reviewed:  [ ] YES     Consultant(s) Notes Reviewed:      Care Discussed with Consultants/Other Providers: [facility] Medicine  Pager [phone]    Patient is a 68y old  Male who presents with a chief complaint of respiratory failure (15 Dec 2018 11:00)      INTERVAL HPI/OVERNIGHT EVENTS:  Watching basketball on the television. no acute complaints at this time. Denies F/C/SOB/CP. Understands plan for surgical biopsy of LN.     MEDICATIONS  (STANDING):  aspirin  chewable 81 milliGRAM(s) Oral daily  dextrose 5%. 1000 milliLiter(s) (50 mL/Hr) IV Continuous <Continuous>  dextrose 50% Injectable 12.5 Gram(s) IV Push once  dextrose 50% Injectable 25 Gram(s) IV Push once  dextrose 50% Injectable 25 Gram(s) IV Push once  docusate sodium Liquid 100 milliGRAM(s) Oral two times a day  heparin  Injectable 5000 Unit(s) SubCutaneous every 8 hours  hydrochlorothiazide 12.5 milliGRAM(s) Oral daily  insulin lispro (HumaLOG) corrective regimen sliding scale   SubCutaneous every 6 hours  lactated ringers. 1000 milliLiter(s) (75 mL/Hr) IV Continuous <Continuous>  losartan 50 milliGRAM(s) Oral daily  piperacillin/tazobactam IVPB. 3.375 Gram(s) IV Intermittent every 8 hours  senna Syrup 10 milliLiter(s) Oral at bedtime  vancomycin  IVPB 1250 milliGRAM(s) IV Intermittent every 12 hours    MEDICATIONS  (PRN):  acetaminophen    Suspension .. 650 milliGRAM(s) Oral every 6 hours PRN Mild Pain (1 - 3)  dextrose 40% Gel 15 Gram(s) Oral once PRN Blood Glucose LESS THAN 70 milliGRAM(s)/deciliter  glucagon  Injectable 1 milliGRAM(s) IntraMuscular once PRN Glucose LESS THAN 70 milligrams/deciliter  morphine  - Injectable 4 milliGRAM(s) IV Push every 4 hours PRN Severe Pain (7 - 10)  oxyCODONE    Solution 5 milliGRAM(s) Oral every 6 hours PRN Moderate Pain (4 - 6)  oxyCODONE    Solution 10 milliGRAM(s) Oral every 6 hours PRN Severe Pain (7 - 10)    Allergies  No Known Allergies    Intolerances    REVIEW OF SYSTEMS:  Please see interval HPI:    Vital Signs Last 24 Hrs  T(C): 37.2 (15 Dec 2018 10:44), Max: 37.2 (15 Dec 2018 10:44)  T(F): 98.9 (15 Dec 2018 10:44), Max: 98.9 (15 Dec 2018 10:44)  HR: 96 (15 Dec 2018 10:44) (78 - 96)  BP: 140/79 (15 Dec 2018 10:44) (114/74 - 152/79)  BP(mean): --  RR: 19 (15 Dec 2018 10:44) (18 - 19)  SpO2: 97% (15 Dec 2018 10:44) (97% - 100%)  I&O's Detail    14 Dec 2018 07:01  -  15 Dec 2018 07:00  --------------------------------------------------------  IN:    Free Water: 750 mL    Glucerna: 1050 mL    IV PiggyBack: 600 mL  Total IN: 2400 mL    OUT:    Voided: 500 mL  Total OUT: 500 mL    Total NET: 1900 mL      15 Dec 2018 07:01  -  15 Dec 2018 13:29  --------------------------------------------------------  IN:    Free Water: 30 mL    IV PiggyBack: 100 mL    lactated ringers.: 300 mL  Total IN: 430 mL    OUT:  Total OUT: 0 mL    Total NET: 430 mL    PHYSICAL EXAM:  GENERAL: NAD, sitting in bed  HEAD:  NC/AT  EYES: EOMI, clear sclera/conjunctiva  ENMT: Protruding tongue,   NECK:  Trach in place, bulky lymphadenopathy L neck  NERVOUS SYSTEM:  AOx3, non-focal  CHEST/LUNG: CTAB, no respiratory distress  HEART: S1S2 RRR  ABDOMEN: soft, non-tender +PEG  EXTREMITIES:  no c/c/e    LABS:                        13.1   7.33  )-----------( 267      ( 15 Dec 2018 06:05 )             37.9     15 Dec 2018 06:05    137    |  97     |  11     ----------------------------<  114    4.1     |  24     |  1.11     Ca    9.5        15 Dec 2018 06:05    CAPILLARY BLOOD GLUCOSE  POCT Blood Glucose.: 111 mg/dL (15 Dec 2018 12:02)  POCT Blood Glucose.: 111 mg/dL (15 Dec 2018 08:37)  POCT Blood Glucose.: 111 mg/dL (15 Dec 2018 06:09)  POCT Blood Glucose.: 129 mg/dL (14 Dec 2018 23:23)  POCT Blood Glucose.: 104 mg/dL (14 Dec 2018 17:39)    BLOOD CULTURE  12-12 @ 09:58 --    NO ORGANISMS ISOLATED  NO ORGANISMS ISOLATED AT 72 HRS.  --    RADIOLOGY & ADDITIONAL TESTS:    Imaging Personally Reviewed:  [ ] YES     Consultant(s) Notes Reviewed:  ENT, heme/onc    Care Discussed with Consultants/Other Providers:

## 2018-12-15 NOTE — PROGRESS NOTE ADULT - SUBJECTIVE AND OBJECTIVE BOX
Pt seen and examined. Tolerating tube feeds.   Med onc concerned for diffuse B cell lymphoma on path, patient going to OR today for more biopsies     afeb  NAD, awake and alert   OC/OP: tongue large protruding out of OC w/ interval improvement no bleeding  Neck: 6LPC in place secured with trach tie and sutures x2, cuff deflated, copious secretions suctioned, 6cm conglomerate of left level II/III neck nodes  abdomen: peg in place     cx: gpc, gvr    A/P  68M w/ L tonsillar mass who presented to the ED with SOB with inability to tolerate his secretions s/p DLB.  -NPO, OR today  -preoo labs   -f/u fever workup  -discussed with ID, hold abx for now   -strict NPO by mouth  -pain control prn  -home meds  -routine trach care, trach teaching  -peg teaching  -DM control -FSG, ISS  -will follow-up with his MO/RO team upon discharge  -f/u path  -fu wbc  -fu vanc trough; cont vanc zosyn  -sqh   -PT/OT  -dispo: SW assistance appreciated, pt will need PEG tube for at least 3 months, failed MBS

## 2018-12-15 NOTE — PROGRESS NOTE ADULT - PROBLEM SELECTOR PLAN 1
-likely in setting of tracheitis/PNA with +trach cultures for MRSA on 12/7, would treat empirically.   -patient also at risk for aspiration, would empirically cover for aspiration PNA for 5 days w/ Zosyn (today day 4/5) and vanc for 7 days for MRSA PNA  - monitor vanc level to avoid nephrotoxicity

## 2018-12-16 LAB
GLUCOSE BLDC GLUCOMTR-MCNC: 104 MG/DL — HIGH (ref 70–99)
GLUCOSE BLDC GLUCOMTR-MCNC: 110 MG/DL — HIGH (ref 70–99)
GLUCOSE BLDC GLUCOMTR-MCNC: 121 MG/DL — HIGH (ref 70–99)
GLUCOSE BLDC GLUCOMTR-MCNC: 126 MG/DL — HIGH (ref 70–99)
VANCOMYCIN TROUGH SERPL-MCNC: 15.5 UG/ML — SIGNIFICANT CHANGE UP (ref 10–20)

## 2018-12-16 PROCEDURE — 99233 SBSQ HOSP IP/OBS HIGH 50: CPT

## 2018-12-16 RX ADMIN — Medication 81 MILLIGRAM(S): at 13:09

## 2018-12-16 RX ADMIN — MORPHINE SULFATE 4 MILLIGRAM(S): 50 CAPSULE, EXTENDED RELEASE ORAL at 16:52

## 2018-12-16 RX ADMIN — MORPHINE SULFATE 4 MILLIGRAM(S): 50 CAPSULE, EXTENDED RELEASE ORAL at 03:18

## 2018-12-16 RX ADMIN — HEPARIN SODIUM 5000 UNIT(S): 5000 INJECTION INTRAVENOUS; SUBCUTANEOUS at 13:09

## 2018-12-16 RX ADMIN — Medication 12.5 MILLIGRAM(S): at 07:43

## 2018-12-16 RX ADMIN — Medication 100 MILLIGRAM(S): at 18:32

## 2018-12-16 RX ADMIN — MORPHINE SULFATE 4 MILLIGRAM(S): 50 CAPSULE, EXTENDED RELEASE ORAL at 03:48

## 2018-12-16 RX ADMIN — Medication 166.67 MILLIGRAM(S): at 22:21

## 2018-12-16 RX ADMIN — SENNA PLUS 10 MILLILITER(S): 8.6 TABLET ORAL at 23:17

## 2018-12-16 RX ADMIN — LOSARTAN POTASSIUM 50 MILLIGRAM(S): 100 TABLET, FILM COATED ORAL at 07:42

## 2018-12-16 RX ADMIN — Medication 166.67 MILLIGRAM(S): at 09:34

## 2018-12-16 RX ADMIN — HEPARIN SODIUM 5000 UNIT(S): 5000 INJECTION INTRAVENOUS; SUBCUTANEOUS at 22:16

## 2018-12-16 RX ADMIN — MORPHINE SULFATE 4 MILLIGRAM(S): 50 CAPSULE, EXTENDED RELEASE ORAL at 18:30

## 2018-12-16 RX ADMIN — PIPERACILLIN AND TAZOBACTAM 25 GRAM(S): 4; .5 INJECTION, POWDER, LYOPHILIZED, FOR SOLUTION INTRAVENOUS at 23:48

## 2018-12-16 RX ADMIN — PIPERACILLIN AND TAZOBACTAM 25 GRAM(S): 4; .5 INJECTION, POWDER, LYOPHILIZED, FOR SOLUTION INTRAVENOUS at 02:35

## 2018-12-16 RX ADMIN — PIPERACILLIN AND TAZOBACTAM 25 GRAM(S): 4; .5 INJECTION, POWDER, LYOPHILIZED, FOR SOLUTION INTRAVENOUS at 13:09

## 2018-12-16 RX ADMIN — HEPARIN SODIUM 5000 UNIT(S): 5000 INJECTION INTRAVENOUS; SUBCUTANEOUS at 07:42

## 2018-12-16 NOTE — PROGRESS NOTE ADULT - SUBJECTIVE AND OBJECTIVE BOX
Pt seen and examined. Tolerating tube feeds. Went to OR yesterday for incisional biopsy of left neck mass.     afeb  NAD, awake and alert   OC/OP: tongue large protruding out of OC w/ interval improvement no bleeding  Neck: 6LPC in place secured with trach tie and sutures x2, cuff deflated, copious secretions suctioned, 6cm conglomerate of left level II/III neck nodes  abdomen: peg in place     resp cx: normal resp miranda  path: B cell lymphoma    A/P  68M w/ L tonsillar mass who presented to the ED with SOB with inability to tolerate his secretions s/p DLB. Final path suggest B cell lymphoma  -follow up hemonc re treatment for lymphoma  -discussed with ID  -dysphagia 1 diet + G-tube feeds  -pain control prn  -home meds  -routine trach care, trach teaching  -peg teaching  -DM control -FSG, ISS  -will discuss with medicine re continuation of abx  -sqh   -PT/OT  -dispo: SW assistance appreciated, pt will need PEG tube for at least 3 months, failed MBS

## 2018-12-16 NOTE — PROGRESS NOTE ADULT - ASSESSMENT
69 yo male PMHx HTN, diet controlled DM type 2, asthma, recent tonsillar mass Bx in clinic, admitted for airway protection s/p trach with Biopsy negative for SCC malignancy, with increasing LAD and now fevers, consulted for medical management and fever workup. On antibiotics, s/p OR on 12/15 for incisional biopsy to establish diagnosis of malignancy.

## 2018-12-16 NOTE — PROGRESS NOTE ADULT - PROBLEM SELECTOR PLAN 1
-likely in setting of tracheitis/PNA with +trach cultures for MRSA on 12/7, would treat empirically.   -patient also at risk for aspiration, would empirically cover for aspiration PNA for 5 days w/ Zosyn (completing today, day 5/5) and vanc for 7 days for MRSA PNA (to finish 12/18 Tuesday)  - monitor vanc level to avoid nephrotoxicity  - currently afebrile

## 2018-12-16 NOTE — PROGRESS NOTE ADULT - PROBLEM SELECTOR PLAN 2
- L tonsillar mass, management as per ENT.   - cytology with atypical B cells, awaiting final bx results.  - appreciate heme/onc input, still concern for head and neck SCC (though negative thus far), flow cytometry also concerning for DLBCL, s/p OR 12/15 for incisional biopsy for further evaluation  - further treatment options to be determined based on biopsy results

## 2018-12-16 NOTE — PROGRESS NOTE ADULT - SUBJECTIVE AND OBJECTIVE BOX
[facility] Medicine  Pager [phone]    Patient is a 68y old  Male who presents with a chief complaint of respiratory failure (16 Dec 2018 09:41)      INTERVAL HPI/OVERNIGHT EVENTS:    MEDICATIONS  (STANDING):  aspirin  chewable 81 milliGRAM(s) Oral daily  dextrose 5%. 1000 milliLiter(s) (50 mL/Hr) IV Continuous <Continuous>  dextrose 50% Injectable 12.5 Gram(s) IV Push once  dextrose 50% Injectable 25 Gram(s) IV Push once  dextrose 50% Injectable 25 Gram(s) IV Push once  docusate sodium Liquid 100 milliGRAM(s) Oral two times a day  heparin  Injectable 5000 Unit(s) SubCutaneous every 8 hours  hydrochlorothiazide 12.5 milliGRAM(s) Oral daily  insulin lispro (HumaLOG) corrective regimen sliding scale   SubCutaneous every 6 hours  losartan 50 milliGRAM(s) Oral daily  piperacillin/tazobactam IVPB. 3.375 Gram(s) IV Intermittent every 8 hours  senna Syrup 10 milliLiter(s) Oral at bedtime  vancomycin  IVPB 1250 milliGRAM(s) IV Intermittent every 12 hours    MEDICATIONS  (PRN):  acetaminophen    Suspension .. 650 milliGRAM(s) Oral every 6 hours PRN Mild Pain (1 - 3)  dextrose 40% Gel 15 Gram(s) Oral once PRN Blood Glucose LESS THAN 70 milliGRAM(s)/deciliter  glucagon  Injectable 1 milliGRAM(s) IntraMuscular once PRN Glucose LESS THAN 70 milligrams/deciliter  morphine  - Injectable 4 milliGRAM(s) IV Push every 4 hours PRN Severe Pain (7 - 10)  oxyCODONE    Solution 5 milliGRAM(s) Oral every 6 hours PRN Moderate Pain (4 - 6)  oxyCODONE    Solution 10 milliGRAM(s) Oral every 6 hours PRN Severe Pain (7 - 10)      Allergies    No Known Allergies    Intolerances        REVIEW OF SYSTEMS:  Please see interval HPI:    Vital Signs Last 24 Hrs  T(C): 36.7 (16 Dec 2018 07:45), Max: 36.9 (15 Dec 2018 15:50)  T(F): 98.1 (16 Dec 2018 07:45), Max: 98.4 (15 Dec 2018 15:50)  HR: 97 (16 Dec 2018 07:45) (79 - 107)  BP: 127/80 (16 Dec 2018 07:45) (115/72 - 138/83)  BP(mean): 83 (15 Dec 2018 16:45) (81 - 92)  RR: 18 (16 Dec 2018 07:45) (15 - 30)  SpO2: 98% (16 Dec 2018 07:45) (93% - 100%)  I&O's Detail    15 Dec 2018 07:01  -  16 Dec 2018 07:00  --------------------------------------------------------  IN:    Free Water: 80 mL    IV PiggyBack: 200 mL    lactated ringers.: 1275 mL  Total IN: 1555 mL    OUT:    Bulb: 22 mL    Voided: 900 mL  Total OUT: 922 mL    Total NET: 633 mL            PHYSICAL EXAM:  GENERAL:   HEAD:    EYES:   ENMT:   NECK:   NERVOUS SYSTEM:    CHEST/LUNG:   HEART:   ABDOMEN:   EXTREMITIES:    LYMPH:   SKIN:     LABS:      Ca    9.5        15 Dec 2018 06:05        CAPILLARY BLOOD GLUCOSE      POCT Blood Glucose.: 104 mg/dL (15 Dec 2018 23:59)  POCT Blood Glucose.: 115 mg/dL (15 Dec 2018 18:28)  POCT Blood Glucose.: 111 mg/dL (15 Dec 2018 12:02)    BLOOD CULTURE    RADIOLOGY & ADDITIONAL TESTS:    Imaging Personally Reviewed:  [ ] YES     Consultant(s) Notes Reviewed:      Care Discussed with Consultants/Other Providers: [facility] Medicine  Pager [phone]    Patient is a 68y old  Male who presents with a chief complaint of respiratory failure (16 Dec 2018 09:41)    INTERVAL HPI/OVERNIGHT EVENTS:  Went to OR yesterday for biopsy. Placed on enhanced supervision last night.   Frustrated at this time. Did not want to use communication board. Awaiting results of biopsy. Glares at the PCA.     MEDICATIONS  (STANDING):  aspirin  chewable 81 milliGRAM(s) Oral daily  dextrose 5%. 1000 milliLiter(s) (50 mL/Hr) IV Continuous <Continuous>  dextrose 50% Injectable 12.5 Gram(s) IV Push once  dextrose 50% Injectable 25 Gram(s) IV Push once  dextrose 50% Injectable 25 Gram(s) IV Push once  docusate sodium Liquid 100 milliGRAM(s) Oral two times a day  heparin  Injectable 5000 Unit(s) SubCutaneous every 8 hours  hydrochlorothiazide 12.5 milliGRAM(s) Oral daily  insulin lispro (HumaLOG) corrective regimen sliding scale   SubCutaneous every 6 hours  losartan 50 milliGRAM(s) Oral daily  piperacillin/tazobactam IVPB. 3.375 Gram(s) IV Intermittent every 8 hours  senna Syrup 10 milliLiter(s) Oral at bedtime  vancomycin  IVPB 1250 milliGRAM(s) IV Intermittent every 12 hours    MEDICATIONS  (PRN):  acetaminophen    Suspension .. 650 milliGRAM(s) Oral every 6 hours PRN Mild Pain (1 - 3)  dextrose 40% Gel 15 Gram(s) Oral once PRN Blood Glucose LESS THAN 70 milliGRAM(s)/deciliter  glucagon  Injectable 1 milliGRAM(s) IntraMuscular once PRN Glucose LESS THAN 70 milligrams/deciliter  morphine  - Injectable 4 milliGRAM(s) IV Push every 4 hours PRN Severe Pain (7 - 10)  oxyCODONE    Solution 5 milliGRAM(s) Oral every 6 hours PRN Moderate Pain (4 - 6)  oxyCODONE    Solution 10 milliGRAM(s) Oral every 6 hours PRN Severe Pain (7 - 10)    Allergies  No Known Allergies    Intolerances    REVIEW OF SYSTEMS:  Please see interval HPI:    Vital Signs Last 24 Hrs  T(C): 36.7 (16 Dec 2018 07:45), Max: 36.9 (15 Dec 2018 15:50)  T(F): 98.1 (16 Dec 2018 07:45), Max: 98.4 (15 Dec 2018 15:50)  HR: 97 (16 Dec 2018 07:45) (79 - 107)  BP: 127/80 (16 Dec 2018 07:45) (115/72 - 138/83)  BP(mean): 83 (15 Dec 2018 16:45) (81 - 92)  RR: 18 (16 Dec 2018 07:45) (15 - 30)  SpO2: 98% (16 Dec 2018 07:45) (93% - 100%)  I&O's Detail    15 Dec 2018 07:01  -  16 Dec 2018 07:00  --------------------------------------------------------  IN:    Free Water: 80 mL    IV PiggyBack: 200 mL    lactated ringers.: 1275 mL  Total IN: 1555 mL    OUT:    Bulb: 22 mL    Voided: 900 mL  Total OUT: 922 mL    Total NET: 633 mL    PHYSICAL EXAM:  GENERAL: NAD, sitting in bed, occasionally tries to peek around curtain to glare at PCA  HEAD: NC/AT    EYES: EOMI, clear sclera/conjunctiva  ENMT: protruding tongue  NECK: trach in place, bulky lymphadenopathy L neck +bulb drain  NERVOUS SYSTEM:  moving all extremities, frustrated at difficulty communicating  CHEST/LUNG: No respiratory distress, requiring intermittent suctioning  ABDOMEN: soft, +PEG  EXTREMITIES:  no c/c/e    LABS:  Ca    9.5        15 Dec 2018 06:05    CAPILLARY BLOOD GLUCOSE  POCT Blood Glucose.: 104 mg/dL (15 Dec 2018 23:59)  POCT Blood Glucose.: 115 mg/dL (15 Dec 2018 18:28)  POCT Blood Glucose.: 111 mg/dL (15 Dec 2018 12:02)    BLOOD CULTURE    RADIOLOGY & ADDITIONAL TESTS:    Imaging Personally Reviewed:  [ ] YES     Consultant(s) Notes Reviewed:      Care Discussed with Consultants/Other Providers: ENT @ [id number]

## 2018-12-17 LAB
BACTERIA BLD CULT: SIGNIFICANT CHANGE UP
BUN SERPL-MCNC: 13 MG/DL — SIGNIFICANT CHANGE UP (ref 7–23)
CALCIUM SERPL-MCNC: 9.4 MG/DL — SIGNIFICANT CHANGE UP (ref 8.4–10.5)
CHLORIDE SERPL-SCNC: 97 MMOL/L — LOW (ref 98–107)
CO2 SERPL-SCNC: 22 MMOL/L — SIGNIFICANT CHANGE UP (ref 22–31)
CREAT SERPL-MCNC: 1.22 MG/DL — SIGNIFICANT CHANGE UP (ref 0.5–1.3)
GLUCOSE BLDC GLUCOMTR-MCNC: 106 MG/DL — HIGH (ref 70–99)
GLUCOSE BLDC GLUCOMTR-MCNC: 114 MG/DL — HIGH (ref 70–99)
GLUCOSE BLDC GLUCOMTR-MCNC: 121 MG/DL — HIGH (ref 70–99)
GLUCOSE SERPL-MCNC: 102 MG/DL — HIGH (ref 70–99)
MAGNESIUM SERPL-MCNC: 2.1 MG/DL — SIGNIFICANT CHANGE UP (ref 1.6–2.6)
PHOSPHATE SERPL-MCNC: 3.2 MG/DL — SIGNIFICANT CHANGE UP (ref 2.5–4.5)
POTASSIUM SERPL-MCNC: 4.7 MMOL/L — SIGNIFICANT CHANGE UP (ref 3.5–5.3)
POTASSIUM SERPL-SCNC: 4.7 MMOL/L — SIGNIFICANT CHANGE UP (ref 3.5–5.3)
SODIUM SERPL-SCNC: 135 MMOL/L — SIGNIFICANT CHANGE UP (ref 135–145)
VANCOMYCIN TROUGH SERPL-MCNC: 14.8 UG/ML — SIGNIFICANT CHANGE UP (ref 10–20)

## 2018-12-17 PROCEDURE — 99233 SBSQ HOSP IP/OBS HIGH 50: CPT

## 2018-12-17 PROCEDURE — 93306 TTE W/DOPPLER COMPLETE: CPT | Mod: 26

## 2018-12-17 RX ORDER — OXYCODONE HYDROCHLORIDE 5 MG/1
10 TABLET ORAL EVERY 6 HOURS
Qty: 0 | Refills: 0 | Status: DISCONTINUED | OUTPATIENT
Start: 2018-12-17 | End: 2018-12-20

## 2018-12-17 RX ADMIN — LOSARTAN POTASSIUM 50 MILLIGRAM(S): 100 TABLET, FILM COATED ORAL at 05:11

## 2018-12-17 RX ADMIN — OXYCODONE HYDROCHLORIDE 5 MILLIGRAM(S): 5 TABLET ORAL at 01:50

## 2018-12-17 RX ADMIN — SENNA PLUS 10 MILLILITER(S): 8.6 TABLET ORAL at 22:45

## 2018-12-17 RX ADMIN — Medication 100 MILLIGRAM(S): at 18:10

## 2018-12-17 RX ADMIN — MORPHINE SULFATE 4 MILLIGRAM(S): 50 CAPSULE, EXTENDED RELEASE ORAL at 04:55

## 2018-12-17 RX ADMIN — HEPARIN SODIUM 5000 UNIT(S): 5000 INJECTION INTRAVENOUS; SUBCUTANEOUS at 14:33

## 2018-12-17 RX ADMIN — HEPARIN SODIUM 5000 UNIT(S): 5000 INJECTION INTRAVENOUS; SUBCUTANEOUS at 05:11

## 2018-12-17 RX ADMIN — MORPHINE SULFATE 4 MILLIGRAM(S): 50 CAPSULE, EXTENDED RELEASE ORAL at 05:10

## 2018-12-17 RX ADMIN — OXYCODONE HYDROCHLORIDE 5 MILLIGRAM(S): 5 TABLET ORAL at 02:20

## 2018-12-17 RX ADMIN — MORPHINE SULFATE 4 MILLIGRAM(S): 50 CAPSULE, EXTENDED RELEASE ORAL at 11:00

## 2018-12-17 RX ADMIN — Medication 12.5 MILLIGRAM(S): at 05:11

## 2018-12-17 RX ADMIN — HEPARIN SODIUM 5000 UNIT(S): 5000 INJECTION INTRAVENOUS; SUBCUTANEOUS at 22:45

## 2018-12-17 RX ADMIN — OXYCODONE HYDROCHLORIDE 10 MILLIGRAM(S): 5 TABLET ORAL at 22:45

## 2018-12-17 RX ADMIN — Medication 81 MILLIGRAM(S): at 12:44

## 2018-12-17 RX ADMIN — Medication 100 MILLIGRAM(S): at 05:11

## 2018-12-17 RX ADMIN — Medication 166.67 MILLIGRAM(S): at 10:38

## 2018-12-17 RX ADMIN — OXYCODONE HYDROCHLORIDE 10 MILLIGRAM(S): 5 TABLET ORAL at 23:15

## 2018-12-17 RX ADMIN — MORPHINE SULFATE 4 MILLIGRAM(S): 50 CAPSULE, EXTENDED RELEASE ORAL at 10:35

## 2018-12-17 NOTE — PROGRESS NOTE ADULT - SUBJECTIVE AND OBJECTIVE BOX
Patient is a 68y old  Male who presents with a chief complaint of respiratory failure (17 Dec 2018 06:26)      SUBJECTIVE / OVERNIGHT EVENTS:    MEDICATIONS  (STANDING):  aspirin  chewable 81 milliGRAM(s) Oral daily  dextrose 5%. 1000 milliLiter(s) (50 mL/Hr) IV Continuous <Continuous>  dextrose 50% Injectable 12.5 Gram(s) IV Push once  dextrose 50% Injectable 25 Gram(s) IV Push once  dextrose 50% Injectable 25 Gram(s) IV Push once  docusate sodium Liquid 100 milliGRAM(s) Oral two times a day  heparin  Injectable 5000 Unit(s) SubCutaneous every 8 hours  hydrochlorothiazide 12.5 milliGRAM(s) Oral daily  insulin lispro (HumaLOG) corrective regimen sliding scale   SubCutaneous every 6 hours  losartan 50 milliGRAM(s) Oral daily  senna Syrup 10 milliLiter(s) Oral at bedtime  vancomycin  IVPB 1250 milliGRAM(s) IV Intermittent every 12 hours    MEDICATIONS  (PRN):  acetaminophen    Suspension .. 650 milliGRAM(s) Oral every 6 hours PRN Mild Pain (1 - 3)  dextrose 40% Gel 15 Gram(s) Oral once PRN Blood Glucose LESS THAN 70 milliGRAM(s)/deciliter  glucagon  Injectable 1 milliGRAM(s) IntraMuscular once PRN Glucose LESS THAN 70 milligrams/deciliter  morphine  - Injectable 4 milliGRAM(s) IV Push every 4 hours PRN Severe Pain (7 - 10)  oxyCODONE    Solution 5 milliGRAM(s) Oral every 6 hours PRN Moderate Pain (4 - 6)        CAPILLARY BLOOD GLUCOSE      POCT Blood Glucose.: 106 mg/dL (17 Dec 2018 05:14)  POCT Blood Glucose.: 121 mg/dL (16 Dec 2018 23:34)  POCT Blood Glucose.: 110 mg/dL (16 Dec 2018 17:31)  POCT Blood Glucose.: 126 mg/dL (16 Dec 2018 12:21)    I&O's Summary    16 Dec 2018 07:01  -  17 Dec 2018 07:00  --------------------------------------------------------  IN: 3120 mL / OUT: 942.5 mL / NET: 2177.5 mL        T(C): 36.4 (12-17-18 @ 04:52), Max: 36.6 (12-16-18 @ 16:58)  HR: 78 (12-17-18 @ 05:10) (67 - 86)  BP: 133/89 (12-17-18 @ 05:10) (105/68 - 144/80)  RR: 17 (12-17-18 @ 04:52) (17 - 18)  SpO2: 97% (12-17-18 @ 04:52) (96% - 100%)    PHYSICAL EXAM:  GENERAL: NAD, sitting in bed, occasionally tries to peek around curtain to glare at PCA  HEAD: NC/AT    EYES: EOMI, clear sclera/conjunctiva  ENMT: protruding tongue  NECK: trach in place, bulky lymphadenopathy L neck +bulb drain  NERVOUS SYSTEM:  moving all extremities, frustrated at difficulty communicating  CHEST/LUNG: No respiratory distress, requiring intermittent suctioning  ABDOMEN: soft, +PEG  EXTREMITIES:  no c/c/e    LABS:    12-17    135  |  97<L>  |  13  ----------------------------<  102<H>  4.7   |  22  |  1.22    Ca    9.4      17 Dec 2018 05:55  Phos  3.2     12-17  Mg     2.1     12-17                  RADIOLOGY & ADDITIONAL TESTS:    Imaging Personally Reviewed:    Consultant(s) Notes Reviewed:      Care Discussed with Consultants/Other Providers: Patient is a 68y old  Male who presents with a chief complaint of respiratory failure (17 Dec 2018 06:26)      SUBJECTIVE / OVERNIGHT EVENTS: Food coming out from trach site made NPO. afebrile.     MEDICATIONS  (STANDING):  aspirin  chewable 81 milliGRAM(s) Oral daily  dextrose 5%. 1000 milliLiter(s) (50 mL/Hr) IV Continuous <Continuous>  dextrose 50% Injectable 12.5 Gram(s) IV Push once  dextrose 50% Injectable 25 Gram(s) IV Push once  dextrose 50% Injectable 25 Gram(s) IV Push once  docusate sodium Liquid 100 milliGRAM(s) Oral two times a day  heparin  Injectable 5000 Unit(s) SubCutaneous every 8 hours  hydrochlorothiazide 12.5 milliGRAM(s) Oral daily  insulin lispro (HumaLOG) corrective regimen sliding scale   SubCutaneous every 6 hours  losartan 50 milliGRAM(s) Oral daily  senna Syrup 10 milliLiter(s) Oral at bedtime  vancomycin  IVPB 1250 milliGRAM(s) IV Intermittent every 12 hours    MEDICATIONS  (PRN):  acetaminophen    Suspension .. 650 milliGRAM(s) Oral every 6 hours PRN Mild Pain (1 - 3)  dextrose 40% Gel 15 Gram(s) Oral once PRN Blood Glucose LESS THAN 70 milliGRAM(s)/deciliter  glucagon  Injectable 1 milliGRAM(s) IntraMuscular once PRN Glucose LESS THAN 70 milligrams/deciliter  morphine  - Injectable 4 milliGRAM(s) IV Push every 4 hours PRN Severe Pain (7 - 10)  oxyCODONE    Solution 5 milliGRAM(s) Oral every 6 hours PRN Moderate Pain (4 - 6)        CAPILLARY BLOOD GLUCOSE      POCT Blood Glucose.: 106 mg/dL (17 Dec 2018 05:14)  POCT Blood Glucose.: 121 mg/dL (16 Dec 2018 23:34)  POCT Blood Glucose.: 110 mg/dL (16 Dec 2018 17:31)  POCT Blood Glucose.: 126 mg/dL (16 Dec 2018 12:21)    I&O's Summary    16 Dec 2018 07:01  -  17 Dec 2018 07:00  --------------------------------------------------------  IN: 3120 mL / OUT: 942.5 mL / NET: 2177.5 mL        T(C): 36.4 (12-17-18 @ 04:52), Max: 36.6 (12-16-18 @ 16:58)  HR: 78 (12-17-18 @ 05:10) (67 - 86)  BP: 133/89 (12-17-18 @ 05:10) (105/68 - 144/80)  RR: 17 (12-17-18 @ 04:52) (17 - 18)  SpO2: 97% (12-17-18 @ 04:52) (96% - 100%)    PHYSICAL EXAM:  GENERAL: NAD, sitting in bed  HEAD: NC/AT    EYES: EOMI, clear sclera/conjunctiva  ENMT: protruding tongue  NECK: trach in place, bulky lymphadenopathy L neck +bulb drain  NERVOUS SYSTEM:  moving all extremities, frustrated at difficulty communicating  CHEST/LUNG: No respiratory distress, decrease BS b/l  ABDOMEN: soft, +PEG  EXTREMITIES:  no c/c/e    LABS:    12-17    135  |  97<L>  |  13  ----------------------------<  102<H>  4.7   |  22  |  1.22    Ca    9.4      17 Dec 2018 05:55  Phos  3.2     12-17  Mg     2.1     12-17                  RADIOLOGY & ADDITIONAL TESTS:    Imaging Personally Reviewed:    Consultant(s) Notes Reviewed:      Care Discussed with Consultants/Other Providers:

## 2018-12-17 NOTE — PROGRESS NOTE ADULT - SUBJECTIVE AND OBJECTIVE BOX
Pt seen and examined. Tolerating tube feeds. Incisional biopsy of left neck mass awaiting path.   Appreciate med onc recs, to see patient today, to discuss inpatient vs. outpatient treatment   patient frankly aspirating, anything he eats comes out of the trach, made NPO     Afebrile   NAD, awake and alert   OC/OP: tongue large protruding out of OC w/ interval improvement no bleeding  Neck: 6LPC in place secured with trach tie and sutures x2, cuff deflated, copious secretions suctioned, 6cm conglomerate of left level II/III neck nodes  abdomen: peg in place     resp cx: normal resp miranda  path: B cell lymphoma    A/P  68M w/ L tonsillar mass who presented to the ED with SOB with inability to tolerate his secretions s/p DLB. Final path suggest B cell lymphoma  -follow up hemonc re treatment for lymphoma today   -discussed with ID  -G-tube feeds  -pain control prn  -home meds  -routine trach care, trach teaching  -peg teaching  -DM control -FSG, ISS  -will discuss with medicine re continuation of abx  -sqh   -PT/OT  -dispo: SW assistance appreciated, pt will need PEG tube for at least 3 months, failed MBS

## 2018-12-17 NOTE — PROGRESS NOTE ADULT - PROBLEM SELECTOR PLAN 1
-likely in setting of tracheitis/PNA with +trach cultures for MRSA on 12/7, would treat empirically.   -patient also at risk for aspiration, would empirically cover for aspiration PNA s/p 5 days of Zosyn and vanc for 7 days for MRSA PNA (to finish 12/18 Tuesday)  - monitor vanc level to avoid nephrotoxicity  - currently afebrile

## 2018-12-18 LAB
B2 MICROGLOB SERPL-MCNC: 3.3 MG/L — HIGH (ref 0.8–2.2)
BUN SERPL-MCNC: 14 MG/DL — SIGNIFICANT CHANGE UP (ref 7–23)
CALCIUM SERPL-MCNC: 9.8 MG/DL — SIGNIFICANT CHANGE UP (ref 8.4–10.5)
CHLORIDE SERPL-SCNC: 96 MMOL/L — LOW (ref 98–107)
CO2 SERPL-SCNC: 25 MMOL/L — SIGNIFICANT CHANGE UP (ref 22–31)
CREAT SERPL-MCNC: 1.21 MG/DL — SIGNIFICANT CHANGE UP (ref 0.5–1.3)
GLUCOSE SERPL-MCNC: 106 MG/DL — HIGH (ref 70–99)
MAGNESIUM SERPL-MCNC: 2 MG/DL — SIGNIFICANT CHANGE UP (ref 1.6–2.6)
PHOSPHATE SERPL-MCNC: 3 MG/DL — SIGNIFICANT CHANGE UP (ref 2.5–4.5)
POTASSIUM SERPL-MCNC: 4 MMOL/L — SIGNIFICANT CHANGE UP (ref 3.5–5.3)
POTASSIUM SERPL-SCNC: 4 MMOL/L — SIGNIFICANT CHANGE UP (ref 3.5–5.3)
SODIUM SERPL-SCNC: 136 MMOL/L — SIGNIFICANT CHANGE UP (ref 135–145)

## 2018-12-18 PROCEDURE — 99232 SBSQ HOSP IP/OBS MODERATE 35: CPT | Mod: GC

## 2018-12-18 PROCEDURE — 99233 SBSQ HOSP IP/OBS HIGH 50: CPT

## 2018-12-18 PROCEDURE — 71260 CT THORAX DX C+: CPT | Mod: 26

## 2018-12-18 PROCEDURE — 74177 CT ABD & PELVIS W/CONTRAST: CPT | Mod: 26

## 2018-12-18 RX ORDER — HEPARIN SODIUM 5000 [USP'U]/ML
5000 INJECTION INTRAVENOUS; SUBCUTANEOUS EVERY 8 HOURS
Qty: 0 | Refills: 0 | Status: DISCONTINUED | OUTPATIENT
Start: 2018-12-18 | End: 2018-12-22

## 2018-12-18 RX ORDER — OXYCODONE HYDROCHLORIDE 5 MG/1
5 TABLET ORAL EVERY 6 HOURS
Qty: 0 | Refills: 0 | Status: DISCONTINUED | OUTPATIENT
Start: 2018-12-18 | End: 2018-12-20

## 2018-12-18 RX ADMIN — LOSARTAN POTASSIUM 50 MILLIGRAM(S): 100 TABLET, FILM COATED ORAL at 06:39

## 2018-12-18 RX ADMIN — HEPARIN SODIUM 5000 UNIT(S): 5000 INJECTION INTRAVENOUS; SUBCUTANEOUS at 22:31

## 2018-12-18 RX ADMIN — Medication 166.67 MILLIGRAM(S): at 00:01

## 2018-12-18 RX ADMIN — Medication 166.67 MILLIGRAM(S): at 13:07

## 2018-12-18 RX ADMIN — OXYCODONE HYDROCHLORIDE 10 MILLIGRAM(S): 5 TABLET ORAL at 16:07

## 2018-12-18 RX ADMIN — Medication 12.5 MILLIGRAM(S): at 06:39

## 2018-12-18 RX ADMIN — HEPARIN SODIUM 5000 UNIT(S): 5000 INJECTION INTRAVENOUS; SUBCUTANEOUS at 06:39

## 2018-12-18 RX ADMIN — HEPARIN SODIUM 5000 UNIT(S): 5000 INJECTION INTRAVENOUS; SUBCUTANEOUS at 13:08

## 2018-12-18 RX ADMIN — Medication 81 MILLIGRAM(S): at 13:08

## 2018-12-18 RX ADMIN — Medication 100 MILLIGRAM(S): at 06:39

## 2018-12-18 RX ADMIN — OXYCODONE HYDROCHLORIDE 10 MILLIGRAM(S): 5 TABLET ORAL at 22:31

## 2018-12-18 RX ADMIN — OXYCODONE HYDROCHLORIDE 10 MILLIGRAM(S): 5 TABLET ORAL at 23:01

## 2018-12-18 NOTE — PROGRESS NOTE ADULT - ASSESSMENT
68M w/ h/o asthma, htn, DM (diet controlled) and recently diagnosed left sided tonsillar mass s/p non-diagnostic tonsil biopsy in the office 11/13/18 who presented to the ED with SOB s/p tracheostomy and pathology negative for squamous cell carcinoma however the flow is concerning for possible lymphoma (monotypic large B cells seen)     1. Tonsillar mass with lymphadenopathy  - Tonsillar mass biopsy non-diagnostic. Flow from FNA of L cervical LN on 12/12 with CD5- CD10- monotypic large cells concerning for a possible Diffuse Large B-Cell Lymphoma  - s/p excisional LN biopsy on 12/15 for additional tissue to establish a more definitive diagnosis. F/U Path  - please obtain CT Chest, abdomen, pelvis with IV contrast for staging  - hep panel negative, TTE with EF of 35%  - pending final path results, pt will need inpatient chemotherapy if confirmed DLBCL    d/w primary team    Jamse Boykin, PGY-5  Hematology-Oncology Fellow  Pager: [phone] ([facility]), [phone] ([facility])  (After 5 pm or on weekends please page the on-call fellow)

## 2018-12-18 NOTE — PROGRESS NOTE ADULT - SUBJECTIVE AND OBJECTIVE BOX
INTERVAL HPI/OVERNIGHT EVENTS:  Patient S&E at bedside. No o/n events,     MEDICATIONS  (STANDING):  aspirin  chewable 81 milliGRAM(s) Oral daily  dextrose 5%. 1000 milliLiter(s) (50 mL/Hr) IV Continuous <Continuous>  dextrose 50% Injectable 12.5 Gram(s) IV Push once  dextrose 50% Injectable 25 Gram(s) IV Push once  dextrose 50% Injectable 25 Gram(s) IV Push once  docusate sodium Liquid 100 milliGRAM(s) Oral two times a day  heparin  Injectable 5000 Unit(s) SubCutaneous every 8 hours  hydrochlorothiazide 12.5 milliGRAM(s) Oral daily  insulin lispro (HumaLOG) corrective regimen sliding scale   SubCutaneous every 6 hours  losartan 50 milliGRAM(s) Oral daily  senna Syrup 10 milliLiter(s) Oral at bedtime  vancomycin  IVPB 1250 milliGRAM(s) IV Intermittent every 12 hours    MEDICATIONS  (PRN):  acetaminophen    Suspension .. 650 milliGRAM(s) Oral every 6 hours PRN Mild Pain (1 - 3)  dextrose 40% Gel 15 Gram(s) Oral once PRN Blood Glucose LESS THAN 70 milliGRAM(s)/deciliter  glucagon  Injectable 1 milliGRAM(s) IntraMuscular once PRN Glucose LESS THAN 70 milligrams/deciliter  oxyCODONE    Solution 5 milliGRAM(s) Oral every 6 hours PRN Moderate Pain (4 - 6)  oxyCODONE    Solution 10 milliGRAM(s) Oral every 6 hours PRN Severe Pain (7 - 10)    Allergies    No Known Allergies    Intolerances        VITAL SIGNS:  T(F): 98.4 (12-18-18 @ 05:48)  HR: 88 (12-18-18 @ 05:48)  BP: 118/81 (12-18-18 @ 05:48)  RR: 19 (12-18-18 @ 05:48)  SpO2: 100% (12-18-18 @ 05:48)  Wt(kg): --    PHYSICAL EXAM:    General: NAD  HEENT: large tongue protruding out, had a trach  CHEST/LUNG: Clear to auscultation bilaterally; No wheeze  HEART: RRR, S1/S2  ABDOMEN: +BS, soft, NT/ND  EXTREMITIES: no LE edema  NEUROLOGY: non-focal    LABS:    12-18    136  |  96<L>  |  14  ----------------------------<  106<H>  4.0   |  25  |  1.21    Ca    9.8      18 Dec 2018 06:30  Phos  3.0     12-18  Mg     2.0     12-18    RADIOLOGY & ADDITIONAL TESTS:  Studies reviewed. INTERVAL HPI/OVERNIGHT EVENTS:  Patient S&E at bedside. No o/n events, pt frustrated and stating he wants to go home.     MEDICATIONS  (STANDING):  aspirin  chewable 81 milliGRAM(s) Oral daily  dextrose 5%. 1000 milliLiter(s) (50 mL/Hr) IV Continuous <Continuous>  dextrose 50% Injectable 12.5 Gram(s) IV Push once  dextrose 50% Injectable 25 Gram(s) IV Push once  dextrose 50% Injectable 25 Gram(s) IV Push once  docusate sodium Liquid 100 milliGRAM(s) Oral two times a day  heparin  Injectable 5000 Unit(s) SubCutaneous every 8 hours  hydrochlorothiazide 12.5 milliGRAM(s) Oral daily  insulin lispro (HumaLOG) corrective regimen sliding scale   SubCutaneous every 6 hours  losartan 50 milliGRAM(s) Oral daily  senna Syrup 10 milliLiter(s) Oral at bedtime  vancomycin  IVPB 1250 milliGRAM(s) IV Intermittent every 12 hours    MEDICATIONS  (PRN):  acetaminophen    Suspension .. 650 milliGRAM(s) Oral every 6 hours PRN Mild Pain (1 - 3)  dextrose 40% Gel 15 Gram(s) Oral once PRN Blood Glucose LESS THAN 70 milliGRAM(s)/deciliter  glucagon  Injectable 1 milliGRAM(s) IntraMuscular once PRN Glucose LESS THAN 70 milligrams/deciliter  oxyCODONE    Solution 5 milliGRAM(s) Oral every 6 hours PRN Moderate Pain (4 - 6)  oxyCODONE    Solution 10 milliGRAM(s) Oral every 6 hours PRN Severe Pain (7 - 10)    Allergies    No Known Allergies    Intolerances    VITAL SIGNS:  T(F): 98.4 (12-18-18 @ 05:48)  HR: 88 (12-18-18 @ 05:48)  BP: 118/81 (12-18-18 @ 05:48)  RR: 19 (12-18-18 @ 05:48)  SpO2: 100% (12-18-18 @ 05:48)  Wt(kg): --    PHYSICAL EXAM:    General: NAD  HEENT: large tongue protruding out, had a trach  CHEST/LUNG: Clear to auscultation bilaterally; No wheeze  HEART: RRR, S1/S2  ABDOMEN: +BS, soft, NT/ND  EXTREMITIES: no LE edema  NEUROLOGY: non-focal    LABS:    12-18    136  |  96<L>  |  14  ----------------------------<  106<H>  4.0   |  25  |  1.21    Ca    9.8      18 Dec 2018 06:30  Phos  3.0     12-18  Mg     2.0     12-18    RADIOLOGY & ADDITIONAL TESTS:  Studies reviewed.

## 2018-12-18 NOTE — PROGRESS NOTE ADULT - PROBLEM SELECTOR PLAN 2
- L tonsillar mass, management as per ENT.   - cytology with atypical B cells, awaiting final bx results.  - appreciate heme/onc input, still concern for head and neck SCC (though negative thus far), flow cytometry also concerning for DLBCL, s/p OR 12/15 for incisional biopsy for further evaluation  - further treatment options to be determined based on biopsy results - L tonsillar mass, management as per ENT.   - cytology with atypical B cells, awaiting final bx results.  - appreciate heme/onc input, still concern for head and neck SCC (though negative thus far), flow cytometry also concerning for DLBCL, s/p OR 12/15 for incisional biopsy for further evaluation  -speech for passy-ambar valve  - further treatment options to be determined based on biopsy results

## 2018-12-18 NOTE — PROGRESS NOTE ADULT - SUBJECTIVE AND OBJECTIVE BOX
Patient is a 68y old  Male who presents with a chief complaint of respiratory failure (18 Dec 2018 09:09)      SUBJECTIVE / OVERNIGHT EVENTS: No acute events ON afebrile    MEDICATIONS  (STANDING):  aspirin  chewable 81 milliGRAM(s) Oral daily  dextrose 5%. 1000 milliLiter(s) (50 mL/Hr) IV Continuous <Continuous>  dextrose 50% Injectable 12.5 Gram(s) IV Push once  dextrose 50% Injectable 25 Gram(s) IV Push once  dextrose 50% Injectable 25 Gram(s) IV Push once  docusate sodium Liquid 100 milliGRAM(s) Oral two times a day  heparin  Injectable 5000 Unit(s) SubCutaneous every 8 hours  hydrochlorothiazide 12.5 milliGRAM(s) Oral daily  insulin lispro (HumaLOG) corrective regimen sliding scale   SubCutaneous every 6 hours  losartan 50 milliGRAM(s) Oral daily  senna Syrup 10 milliLiter(s) Oral at bedtime  vancomycin  IVPB 1250 milliGRAM(s) IV Intermittent every 12 hours    MEDICATIONS  (PRN):  acetaminophen    Suspension .. 650 milliGRAM(s) Oral every 6 hours PRN Mild Pain (1 - 3)  dextrose 40% Gel 15 Gram(s) Oral once PRN Blood Glucose LESS THAN 70 milliGRAM(s)/deciliter  glucagon  Injectable 1 milliGRAM(s) IntraMuscular once PRN Glucose LESS THAN 70 milligrams/deciliter  oxyCODONE    Solution 5 milliGRAM(s) Oral every 6 hours PRN Moderate Pain (4 - 6)  oxyCODONE    Solution 10 milliGRAM(s) Oral every 6 hours PRN Severe Pain (7 - 10)        CAPILLARY BLOOD GLUCOSE      POCT Blood Glucose.: 121 mg/dL (17 Dec 2018 17:41)  POCT Blood Glucose.: 114 mg/dL (17 Dec 2018 12:53)    I&O's Summary    17 Dec 2018 07:01  -  18 Dec 2018 07:00  --------------------------------------------------------  IN: 3080 mL / OUT: 1442.5 mL / NET: 1637.5 mL        T(C): 36.9 (12-18-18 @ 05:48), Max: 36.9 (12-17-18 @ 18:20)  HR: 88 (12-18-18 @ 05:48) (79 - 88)  BP: 118/81 (12-18-18 @ 05:48) (117/76 - 124/77)  RR: 19 (12-18-18 @ 05:48) (17 - 19)  SpO2: 100% (12-18-18 @ 05:48) (95% - 100%)    PHYSICAL EXAM:  GENERAL: NAD, sitting in bed  HEAD: NC/AT    EYES: EOMI, clear sclera/conjunctiva  ENMT: protruding tongue  NECK: trach in place, bulky lymphadenopathy L neck +bulb drain  NERVOUS SYSTEM:  moving all extremities, frustrated at difficulty communicating  CHEST/LUNG: No respiratory distress, decrease BS b/l  ABDOMEN: soft, +PEG  EXTREMITIES:  no c/c/e  LABS:    12-18    136  |  96<L>  |  14  ----------------------------<  106<H>  4.0   |  25  |  1.21    Ca    9.8      18 Dec 2018 06:30  Phos  3.0     12-18  Mg     2.0     12-18        Culture - Respiratory:   CULTURE IN PROGRESS, FURTHER REPORT TO FOLLOW. (12.12.18 @ 10:50)    Culture - Blood:   NO ORGANISMS ISOLATED (12.12.18 @ 09:58)    Culture - Blood:   NO ORGANISMS ISOLATED (12.12.18 @ 09:58)    Gram Stain Sputum:   GPCPR Gram Pos Cocci in Pairs  QUANTITY OF BACTERIA SEEN: RARE (1+)  WBC^White Blood Cells  QNTY CELLS IN GRAM STAIN: RARE (1+) (12.07.18 @ 10:00)    Culture - Respiratory with Gram Stain (12.07.18 @ 10:00)    Gram Stain Sputum:   GPCPR Gram Pos Cocci in Pairs  QUANTITY OF BACTERIA SEEN: RARE (1+)  WBC^White Blood Cells  QNTY CELLS IN GRAM STAIN: RARE (1+)    -  Cefazolin: R 16 GINNA    -  Ciprofloxacin: R >2 GINNA    -  Clindamycin: S 0.5 GINNA    -  Erythromycin: R >4 GINNA    -  Gentamicin: S <=1 GINNA    -  Levofloxacin: R >4 GINNA    -  Linezolid: S 4 GINNA    -  Moxifloxacin(Aerobic): R 2 GINNA    -  Oxacillin: R >2 GINNA    -  Penicillin: R >8 GINNA    -  Rifampin: S <=1 GINNA    -  Tetra/Doxy: S <=1 GINNA    -  Trimethoprim/Sulfamethoxazole: S <=0.5/9.5 GINNA    -  Vancomycin: S 2 GINNA    Culture - Respiratory:   Normal Respiratory Marcy Also Present  RESULT CALLED TO / READ BACK:JOSE LEONG RN/Y  DATE / TIME CALLED: 12/09/18 1328  CALLED BY: NAVDEEP TOMPKINS    Specimen Source: TRACHEAL ASPIRATE    Organism Identification: Staph. aureus *MRSA*    Organism: Staph. aureus *MRSA*  QUANTITY OF GROWTH: MODERATE  OXICILLIN-RESISTANT staphylococci should be regarded as  RESISTANT to ALL other Beta-Lactams regardless of the  in-vitro results obtained.  These include: ALL  Penicillins, Cephalosporins, Amoxicillin-clavulanic  acid, Ticarcillin-clavulanic acid,  Ampicillin-sulbactam, and Imipenem.    Method Type: POSITIVE GINNA 29              RADIOLOGY & ADDITIONAL TESTS:    Imaging Personally Reviewed:    Consultant(s) Notes Reviewed:      Care Discussed with Consultants/Other Providers:

## 2018-12-18 NOTE — CHART NOTE - NSCHARTNOTEFT_GEN_A_CORE
Source: Patient [X ]    Family [ ]     other [ X] electronic chart, RN,     Diet : NPO with tube feed,  Glucerna1.2 via gastrostomy bolus feeds of 350ml every 6 hours.     Patient seen for nutrition follow-up. Patient tolerating feeds well, without any GI distress (nausea, vomiting, diarrhea, constipation) as confirmed with RN. Current tube feeding regimen provides total volume of 1400ml, 1680kcal and 84g pro (25kcal/kg and 1.3g/kg of IBW). Planning for d/c as discussed with .        Weight (kg): 97.5 (12-07 @ 12:58) No new weight to assess IBW: 65 kg       Pertinent Medications: dextrose 5%.  dextrose 50% Injectable  dextrose 50% Injectable  dextrose 50% Injectable  docusate sodium Liquid  hydrochlorothiazide  insulin lispro (HumaLOG) corrective regimen sliding scale  losartan  senna Syrup    Pertinent Labs:  12-18 Na136 mmol/L Glu 106 mg/dL<H> K+ 4.0 mmol/L Cr  1.21 mg/dL BUN 14 mg/dL 12-18 Phos 3.0 mg/dL 11-29 [id number] 5.9 %<H>      Skin: intact. No edema noted.     Estimated Needs:   [ X] no change since previous assessment RDN 12/8/18  [ ] recalculated:       Previous Nutrition Diagnosis:     [ X] Inadequate Energy Intake [ ]Inadequate Oral Intake [ ] Excessive Energy Intake     [ ] Underweight [ ] Increased Nutrient Needs [ ] Overweight/Obesity     [ ] Altered GI Function [ ] Unintended Weight Loss [ ] Food & Nutrition Related Knowledge Deficit [ ] Malnutrition      Nutrition Diagnosis is [ ] ongoing  [X ] resolved [ ] not applicable       Additional Recommendations:   1. Continue current TF order, which remains appropriate at this time.   2. Monitor weights, labs, BM's, skin integrity, tube feed tolerance.   3.  Further adjustments to rate/volume/duration/free water provision of enteral feeds dependant on long term monitoring of patient's tolerance, weight trends and needs.

## 2018-12-18 NOTE — PROGRESS NOTE ADULT - SUBJECTIVE AND OBJECTIVE BOX
Pt seen and examined. Tolerating tube feeds. Tolerates finger occlusion    Afebrile   NAD, awake and alert   OC/OP: tongue large protruding out of OC w/ interval improvement no bleeding  Neck: 6CFS in place secured with trach tie, secretions suctioned, 6cm conglomerate of left level II/III neck nodes  abdomen: peg in place     resp cx: normal resp miranda  path: B cell lymphoma    A/P  68M w/ L tonsillar mass who presented to the ED with SOB with inability to tolerate his secretions s/p DLB. Final path suggest B cell lymphoma  -follow up hemonc re treatment for lymphoma today   -discussed with ID  -G-tube feeds  -pain control prn  -home meds  -routine trach care, trach teaching  -SLP for PMV today  -peg teaching  -DM control -FSG, ISS  -finish abx today  -sqh   -PT/OT  -dispo: SW assistance appreciated, pt will need PEG tube for at least 3 months, failed MBS

## 2018-12-18 NOTE — PROGRESS NOTE ADULT - PROBLEM SELECTOR PLAN 1
-likely in setting of tracheitis/PNA with +trach cultures for MRSA on 12/7, would treat empirically.   -patient also at risk for aspiration, would empirically cover for aspiration PNA s/p 5 days of Zosyn and vanc for 7 days for MRSA PNA (to finish 12/18 Today)  - monitor vanc level to avoid nephrotoxicity -likely in setting of tracheitis/PNA with +trach cultures for MRSA on 12/7, would treat empirically.   -patient also at risk for aspiration, would empirically cover for aspiration PNA s/p 5 days of Zosyn and vanc for 7 days for MRSA PNA (to finish 12/18 Today)  -currently NPO with PEG as noted to be aspirating from trach   - monitor vanc level to avoid nephrotoxicity

## 2018-12-18 NOTE — PROGRESS NOTE ADULT - ASSESSMENT
67 yo male PMHx HTN, diet controlled DM type 2, asthma, recent tonsillar mass Bx in clinic, admitted for airway protection s/p trach with Biopsy negative for SCC malignancy, with increasing LAD and now fevers, consulted for medical management and fever workup. On antibiotics, s/p OR on 12/15 for incisional biopsy to establish diagnosis of malignancy.

## 2018-12-19 ENCOUNTER — RESULT REVIEW (OUTPATIENT)
Age: 68
End: 2018-12-19

## 2018-12-19 DIAGNOSIS — N28.9 DISORDER OF KIDNEY AND URETER, UNSPECIFIED: ICD-10-CM

## 2018-12-19 LAB
BUN SERPL-MCNC: 15 MG/DL — SIGNIFICANT CHANGE UP (ref 7–23)
CALCIUM SERPL-MCNC: 9.8 MG/DL — SIGNIFICANT CHANGE UP (ref 8.4–10.5)
CHLORIDE SERPL-SCNC: 96 MMOL/L — LOW (ref 98–107)
CO2 SERPL-SCNC: 26 MMOL/L — SIGNIFICANT CHANGE UP (ref 22–31)
CREAT SERPL-MCNC: 1.26 MG/DL — SIGNIFICANT CHANGE UP (ref 0.5–1.3)
GLUCOSE BLDC GLUCOMTR-MCNC: 121 MG/DL — HIGH (ref 70–99)
GLUCOSE SERPL-MCNC: 104 MG/DL — HIGH (ref 70–99)
HCT VFR BLD CALC: 35.5 % — LOW (ref 39–50)
HGB BLD-MCNC: 12.1 G/DL — LOW (ref 13–17)
MAGNESIUM SERPL-MCNC: 2.1 MG/DL — SIGNIFICANT CHANGE UP (ref 1.6–2.6)
MCHC RBC-ENTMCNC: 33.2 PG — SIGNIFICANT CHANGE UP (ref 27–34)
MCHC RBC-ENTMCNC: 34.1 % — SIGNIFICANT CHANGE UP (ref 32–36)
MCV RBC AUTO: 97.5 FL — SIGNIFICANT CHANGE UP (ref 80–100)
NON-GYNECOLOGICAL CYTOLOGY STUDY: SIGNIFICANT CHANGE UP
NRBC # FLD: 0 — SIGNIFICANT CHANGE UP
PHOSPHATE SERPL-MCNC: 4.5 MG/DL — SIGNIFICANT CHANGE UP (ref 2.5–4.5)
PLATELET # BLD AUTO: 338 K/UL — SIGNIFICANT CHANGE UP (ref 150–400)
PMV BLD: 10.9 FL — SIGNIFICANT CHANGE UP (ref 7–13)
POTASSIUM SERPL-MCNC: 4.7 MMOL/L — SIGNIFICANT CHANGE UP (ref 3.5–5.3)
POTASSIUM SERPL-SCNC: 4.7 MMOL/L — SIGNIFICANT CHANGE UP (ref 3.5–5.3)
RBC # BLD: 3.64 M/UL — LOW (ref 4.2–5.8)
RBC # FLD: 12.1 % — SIGNIFICANT CHANGE UP (ref 10.3–14.5)
SODIUM SERPL-SCNC: 136 MMOL/L — SIGNIFICANT CHANGE UP (ref 135–145)
WBC # BLD: 6.88 K/UL — SIGNIFICANT CHANGE UP (ref 3.8–10.5)
WBC # FLD AUTO: 6.88 K/UL — SIGNIFICANT CHANGE UP (ref 3.8–10.5)

## 2018-12-19 PROCEDURE — 99232 SBSQ HOSP IP/OBS MODERATE 35: CPT

## 2018-12-19 PROCEDURE — 88367 INSITU HYBRIDIZATION AUTO: CPT | Mod: 26

## 2018-12-19 PROCEDURE — 38221 DX BONE MARROW BIOPSIES: CPT

## 2018-12-19 PROCEDURE — 88305 TISSUE EXAM BY PATHOLOGIST: CPT | Mod: 26

## 2018-12-19 PROCEDURE — 88342 IMHCHEM/IMCYTCHM 1ST ANTB: CPT | Mod: 26,59

## 2018-12-19 PROCEDURE — 88313 SPECIAL STAINS GROUP 2: CPT | Mod: 26

## 2018-12-19 PROCEDURE — 88360 TUMOR IMMUNOHISTOCHEM/MANUAL: CPT | Mod: 26

## 2018-12-19 PROCEDURE — 85097 BONE MARROW INTERPRETATION: CPT

## 2018-12-19 PROCEDURE — 88365 INSITU HYBRIDIZATION (FISH): CPT | Mod: 26,59

## 2018-12-19 PROCEDURE — 88189 FLOWCYTOMETRY/READ 16 & >: CPT

## 2018-12-19 PROCEDURE — 88341 IMHCHEM/IMCYTCHM EA ADD ANTB: CPT | Mod: 26,59

## 2018-12-19 PROCEDURE — 88364 INSITU HYBRIDIZATION (FISH): CPT | Mod: 26

## 2018-12-19 PROCEDURE — 99233 SBSQ HOSP IP/OBS HIGH 50: CPT | Mod: GC

## 2018-12-19 RX ORDER — SODIUM CHLORIDE 9 MG/ML
1000 INJECTION INTRAMUSCULAR; INTRAVENOUS; SUBCUTANEOUS
Qty: 0 | Refills: 0 | Status: DISCONTINUED | OUTPATIENT
Start: 2018-12-19 | End: 2018-12-20

## 2018-12-19 RX ADMIN — LOSARTAN POTASSIUM 50 MILLIGRAM(S): 100 TABLET, FILM COATED ORAL at 05:15

## 2018-12-19 RX ADMIN — Medication 166.67 MILLIGRAM(S): at 23:45

## 2018-12-19 RX ADMIN — OXYCODONE HYDROCHLORIDE 10 MILLIGRAM(S): 5 TABLET ORAL at 05:44

## 2018-12-19 RX ADMIN — OXYCODONE HYDROCHLORIDE 10 MILLIGRAM(S): 5 TABLET ORAL at 05:14

## 2018-12-19 RX ADMIN — HEPARIN SODIUM 5000 UNIT(S): 5000 INJECTION INTRAVENOUS; SUBCUTANEOUS at 14:14

## 2018-12-19 RX ADMIN — SENNA PLUS 10 MILLILITER(S): 8.6 TABLET ORAL at 22:05

## 2018-12-19 RX ADMIN — SODIUM CHLORIDE 50 MILLILITER(S): 9 INJECTION INTRAMUSCULAR; INTRAVENOUS; SUBCUTANEOUS at 14:14

## 2018-12-19 RX ADMIN — OXYCODONE HYDROCHLORIDE 10 MILLIGRAM(S): 5 TABLET ORAL at 15:05

## 2018-12-19 RX ADMIN — HEPARIN SODIUM 5000 UNIT(S): 5000 INJECTION INTRAVENOUS; SUBCUTANEOUS at 22:05

## 2018-12-19 RX ADMIN — OXYCODONE HYDROCHLORIDE 10 MILLIGRAM(S): 5 TABLET ORAL at 15:20

## 2018-12-19 RX ADMIN — Medication 81 MILLIGRAM(S): at 12:38

## 2018-12-19 RX ADMIN — OXYCODONE HYDROCHLORIDE 10 MILLIGRAM(S): 5 TABLET ORAL at 23:37

## 2018-12-19 RX ADMIN — HEPARIN SODIUM 5000 UNIT(S): 5000 INJECTION INTRAVENOUS; SUBCUTANEOUS at 05:15

## 2018-12-19 RX ADMIN — Medication 12.5 MILLIGRAM(S): at 05:15

## 2018-12-19 NOTE — PROGRESS NOTE ADULT - ASSESSMENT
68M w/ h/o asthma, htn, DM (diet controlled) and recently diagnosed left sided tonsillar mass s/p non-diagnostic tonsil biopsy in the office 11/13/18 who presented to the ED with SOB s/p tracheostomy and pathology now confirming DLBCL GCB subtype    1. DLBCL:   - core LN biopsy consistent with DLBCL GCB subtype  - also had an excisional LN bx on 12/15  - CT C/A/P with indeterminate adrenal nodules only   - hep panel negative, TTE with EF of 35%  - will need BM biopsy to complete staging  - discussed diagnosis of lymphoma and treatment with chemotherapy at length with the pt today  - currently he is very frustrated and requesting to go home. He wants to see his family and I spoke with his cousin Tez per his request. Updated her regarding biopsy results and plan for bone marrow biopsy and inpatient chemotherapy as well. After prolonged discussion, he is declining bone marrow biopsy until his family is present at bedside.     d/w primary team    James Boykin, PGY-5  Hematology-Oncology Fellow  Pager: [phone] ([facility]), [phone] ([facility])  (After 5 pm or on weekends please page the on-call fellow) 68M w/ h/o asthma, htn, DM (diet controlled) and recently diagnosed left sided tonsillar mass s/p non-diagnostic tonsil biopsy in the office 11/13/18 who presented to the ED with SOB s/p tracheostomy and pathology now confirming DLBCL GCB subtype    1. DLBCL:   - core LN biopsy consistent with DLBCL GCB subtype, FISH negative for BCL6, MYC, MYC/IGH and IGH/BLC2 translocation  - also had an excisional LN bx on 12/15  - CT C/A/P with indeterminate adrenal nodules only   - hep panel negative, TTE with EF of 35%  - will need BM biopsy to complete staging  - discussed diagnosis of lymphoma and treatment with chemotherapy at length with the pt today  - currently he is very frustrated and requesting to go home. He wants to see his family and I spoke with his cousin Tez per his request. Updated her regarding biopsy results and plan for bone marrow biopsy and inpatient chemotherapy as well. After prolonged discussion, he is declining bone marrow biopsy until his family is present at bedside.     d/w primary team    James Boykin, PGY-5  Hematology-Oncology Fellow  Pager: [phone] ([facility]), [phone] ([facility])  (After 5 pm or on weekends please page the on-call fellow) 68M w/ h/o asthma, htn, DM (diet controlled) and recently diagnosed left sided tonsillar mass s/p non-diagnostic tonsil biopsy in the office 11/13/18 who presented to the ED with SOB s/p tracheostomy and pathology now confirming DLBCL GCB subtype    1. DLBCL:   - core LN biopsy consistent with DLBCL GCB subtype, FISH negative for BCL6, MYC, MYC/IGH and IGH/BLC2 translocation  - also had an excisional LN bx on 12/15  - CT C/A/P with indeterminate adrenal nodules only   - hep panel negative, TTE with EF of 35%  - will need BM biopsy to complete staging  - discussed diagnosis of lymphoma and treatment with chemotherapy at length with the pt today  - currently he is very frustrated and requesting to go home. He wants to see his family and I spoke with his cousin Tez per his request. Updated her regarding biopsy results and plan for bone marrow biopsy and inpatient chemotherapy as well. After prolonged discussion, he is declining bone marrow biopsy until his family is present at bedside.     Update:  - Pt's family at bedside. Pt is now agreeable for BM biopsy. Discussed starting chemotherapy with R-CEOP regimen starting tomorrow. Risks, benefits, alternatives, and side effects of chemotherapy discussed with patient and his family. All questions answered. Pt signed informed chemotherapy consent.  - Will do BM biopsy today  - Plan to start Rituxan and prednisone on 12/20. Cytoxan, Etoposide, and Vincristine on Day 2 (12/21). Etoposide will be for 3 days (ending on 12/23)    d/w primary team    James Boykin, PGY-5  Hematology-Oncology Fellow  Pager: [phone] ([facility]), [phone] ([facility])  (After 5 pm or on weekends please page the on-call fellow)

## 2018-12-19 NOTE — PROCEDURE NOTE - PROCEDURE
<<-----Click on this checkbox to enter Procedure Bone marrow aspirate &biopsy  12/19/2018    Active  [id number]

## 2018-12-19 NOTE — PROGRESS NOTE ADULT - SUBJECTIVE AND OBJECTIVE BOX
Pt seen and examined. Tolerating tube feeds. Tolerates finger occlusion. vanc completed yesterday.    Afebrile   NAD, awake and alert   OC/OP: tongue large protruding out of OC w/ interval improvement no bleeding  Neck: 6CFS in place secured with trach tie, secretions suctioned, 6cm conglomerate of left level II/III neck nodes  abdomen: peg in place     A/P  68M w/ L tonsillar mass who presented to the ED with SOB with inability to tolerate his secretions s/p DLB. Final path suggest B cell lymphoma  -transfer to inpt medicine service today to begin chemo per hemeonc  -G-tube feeds  -pain control prn  -home meds  -routine trach care, trach teaching  -SLP for PMV   -peg/trach teaching  -DM control -FSG, ISS  -sqh   -PT/OT  -dispo: SW assistance appreciated, pt will need PEG tube for at least 3 months, failed MBS; scripts for supplies given to case management

## 2018-12-19 NOTE — PROGRESS NOTE ADULT - PROBLEM SELECTOR PLAN 1
-resolved- completed course of rx w/ vanco/zosyn for tracheitis/PNA   -patient also at risk for aspiration,   -currently NPO with PEG as noted to be aspirating from trach

## 2018-12-19 NOTE — PROGRESS NOTE ADULT - SUBJECTIVE AND OBJECTIVE BOX
INTERVAL HPI/OVERNIGHT EVENTS:  Patient S&E at bedside. No o/n events, pathology confirms DLBCL. Pt remains frustrated about not seeing any family and stating he wants to go home.     MEDICATIONS  (STANDING):  aspirin  chewable 81 milliGRAM(s) Oral daily  dextrose 5%. 1000 milliLiter(s) (50 mL/Hr) IV Continuous <Continuous>  dextrose 50% Injectable 12.5 Gram(s) IV Push once  dextrose 50% Injectable 25 Gram(s) IV Push once  dextrose 50% Injectable 25 Gram(s) IV Push once  docusate sodium Liquid 100 milliGRAM(s) Oral two times a day  heparin  Injectable 5000 Unit(s) SubCutaneous every 8 hours  insulin lispro (HumaLOG) corrective regimen sliding scale   SubCutaneous every 6 hours  senna Syrup 10 milliLiter(s) Oral at bedtime  sodium chloride 0.9%. 1000 milliLiter(s) (50 mL/Hr) IV Continuous <Continuous>    MEDICATIONS  (PRN):  acetaminophen    Suspension .. 650 milliGRAM(s) Oral every 6 hours PRN Mild Pain (1 - 3)  dextrose 40% Gel 15 Gram(s) Oral once PRN Blood Glucose LESS THAN 70 milliGRAM(s)/deciliter  glucagon  Injectable 1 milliGRAM(s) IntraMuscular once PRN Glucose LESS THAN 70 milligrams/deciliter  oxyCODONE    Solution 10 milliGRAM(s) Oral every 6 hours PRN Severe Pain (7 - 10)  oxyCODONE    Solution 5 milliGRAM(s) Oral every 6 hours PRN Moderate Pain (4 - 6)    Allergies    No Known Allergies    Intolerances    Vital Signs Last 24 Hrs  T(C): 36.2 (19 Dec 2018 10:11), Max: 37.1 (18 Dec 2018 22:43)  T(F): 97.2 (19 Dec 2018 10:11), Max: 98.7 (18 Dec 2018 22:43)  HR: 92 (19 Dec 2018 10:11) (57 - 96)  BP: 128/61 (19 Dec 2018 10:11) (102/62 - 145/75)  BP(mean): --  RR: 18 (19 Dec 2018 10:11) (17 - 19)  SpO2: 99% (19 Dec 2018 10:11) (95% - 100%)    PHYSICAL EXAM:    General: NAD  HEENT: large tongue protruding out, had a trach  CHEST/LUNG: Clear to auscultation bilaterally; No wheeze  HEART: RRR, S1/S2  ABDOMEN: +BS, soft, NT/ND  EXTREMITIES: no LE edema  NEUROLOGY: non-focal    LABS:                          12.1   6.88  )-----------( 338      ( 19 Dec 2018 06:45 )             35.5   12-19    136  |  96<L>  |  15  ----------------------------<  104<H>  4.7   |  26  |  1.26    Ca    9.8      19 Dec 2018 06:45  Phos  4.5     12-19  Mg     2.1     12-19    RADIOLOGY & ADDITIONAL TESTS:  Studies reviewed.

## 2018-12-19 NOTE — PROGRESS NOTE ADULT - ASSESSMENT
69 yo male PMHx HTN, diet controlled DM type 2, asthma, recent tonsillar mass Bx in clinic, admitted for airway protection s/p trach and peg- was on ENT service- biopsy shows DLBCL- transferred to medicine for chemo- pt had to be convinced to stay inpatient to get the chemo- on cont pulse ox d/t trach

## 2018-12-19 NOTE — PROCEDURE NOTE - NSTOLERANCE_GEN_A_CORE
X-ray to left femur: (-) fx to shaft; X-ray to left hip: (+) left hip intratrochanteric fx/n/a
Patient tolerated procedure well.

## 2018-12-19 NOTE — PROGRESS NOTE ADULT - SUBJECTIVE AND OBJECTIVE BOX
Dr. Rodríguez pager [phone]    Patient is a 68y old  Male who presents with a chief complaint of respiratory failure (19 Dec 2018 13:25)      SUBJECTIVE / OVERNIGHT EVENTS: pt seen today at 11am- was upset at that time about why he is in the hospital  he wanted to go home  insisted that heme fellow speak to his family before bone marrow biopsy done  he finally agreed to it  pt was frustrated when answering writer's questions.      MEDICATIONS  (STANDING):  aspirin  chewable 81 milliGRAM(s) Oral daily  dextrose 5%. 1000 milliLiter(s) (50 mL/Hr) IV Continuous <Continuous>  dextrose 50% Injectable 12.5 Gram(s) IV Push once  dextrose 50% Injectable 25 Gram(s) IV Push once  dextrose 50% Injectable 25 Gram(s) IV Push once  docusate sodium Liquid 100 milliGRAM(s) Oral two times a day  heparin  Injectable 5000 Unit(s) SubCutaneous every 8 hours  insulin lispro (HumaLOG) corrective regimen sliding scale   SubCutaneous every 6 hours  senna Syrup 10 milliLiter(s) Oral at bedtime  sodium chloride 0.9%. 1000 milliLiter(s) (50 mL/Hr) IV Continuous <Continuous>    MEDICATIONS  (PRN):  acetaminophen    Suspension .. 650 milliGRAM(s) Oral every 6 hours PRN Mild Pain (1 - 3)  dextrose 40% Gel 15 Gram(s) Oral once PRN Blood Glucose LESS THAN 70 milliGRAM(s)/deciliter  glucagon  Injectable 1 milliGRAM(s) IntraMuscular once PRN Glucose LESS THAN 70 milligrams/deciliter  oxyCODONE    Solution 10 milliGRAM(s) Oral every 6 hours PRN Severe Pain (7 - 10)  oxyCODONE    Solution 5 milliGRAM(s) Oral every 6 hours PRN Moderate Pain (4 - 6)      Meds ordered within last 24hours  oxyCODONE    Solution:   5 milliGRAM(s), Oral via PEG tube, every 6 hours, PRN for Moderate Pain (4 - 6)  Administration Instructions: This is a Look-alike/Sound-alike Medication  Provider's Contact #: [phone] (12-18 @ 21:06)  sodium chloride 0.9%.: Solution, 1000 milliLiter(s) infuse at 50 mL/Hr, Stop After 24 Hours  Provider's Contact #: [phone] (12-19 @ 11:37)      T(C): 36.4 (12-19-18 @ 14:00), Max: 37.1 (12-18-18 @ 22:43)  HR: 95 (12-19-18 @ 14:00) (57 - 96)  BP: 107/67 (12-19-18 @ 14:00) (102/62 - 145/75)  RR: 17 (12-19-18 @ 14:00) (17 - 19)  SpO2: 100% (12-19-18 @ 14:00) (95% - 100%)    CAPILLARY BLOOD GLUCOSE  106 (19 Dec 2018 12:15)        I&O's Summary    18 Dec 2018 07:01  -  19 Dec 2018 07:00  --------------------------------------------------------  IN: 1650 mL / OUT: 0 mL / NET: 1650 mL    19 Dec 2018 07:01  -  19 Dec 2018 16:20  --------------------------------------------------------  IN: 650 mL / OUT: 0 mL / NET: 650 mL        PHYSICAL EXAM:  GENERAL: NAD slurred speech due to large tongue  CHEST/LUNG: Clear to auscultation bilaterally; No wheeze  HEART: Regular rate and rhythm; No murmurs, rubs, or gallops  ABDOMEN: Soft, Nontender, Nondistended; Bowel sounds present  EXTREMITIES:  No clubbing, cyanosis, or edema        LABS:                        12.1   6.88  )-----------( 338      ( 19 Dec 2018 06:45 )             35.5     12-19    136  |  96<L>  |  15  ----------------------------<  104<H>  4.7   |  26  |  1.26    Ca    9.8      19 Dec 2018 06:45  Phos  4.5     12-19  Mg     2.1     12-19                RADIOLOGY & ADDITIONAL TESTS:    Imaging Personally Reviewed:    Consultant(s) Notes Reviewed:      Care Discussed with Consultants/Other Providers:

## 2018-12-19 NOTE — PROGRESS NOTE ADULT - PROBLEM SELECTOR PLAN 2
- L tonsillar mass, management as per ENT.   - cytology with atypical B cells, awaiting final bx results.  - appreciate heme/onc input, still concern for head and neck SCC (though negative thus far), flow cytometry also concerning for DLBCL, s/p OR 12/15 for incisional biopsy for further evaluation  -speech for passy-ambar valve  - heme to do bone marrow biopsy w/ plans to start rx 12/20

## 2018-12-20 DIAGNOSIS — N17.9 ACUTE KIDNEY FAILURE, UNSPECIFIED: ICD-10-CM

## 2018-12-20 DIAGNOSIS — Z02.9 ENCOUNTER FOR ADMINISTRATIVE EXAMINATIONS, UNSPECIFIED: ICD-10-CM

## 2018-12-20 LAB
APPEARANCE UR: CLEAR — SIGNIFICANT CHANGE UP
BILIRUB UR-MCNC: NEGATIVE — SIGNIFICANT CHANGE UP
BLOOD UR QL VISUAL: NEGATIVE — SIGNIFICANT CHANGE UP
BUN SERPL-MCNC: 20 MG/DL — SIGNIFICANT CHANGE UP (ref 7–23)
CALCIUM SERPL-MCNC: 9.6 MG/DL — SIGNIFICANT CHANGE UP (ref 8.4–10.5)
CHLORIDE SERPL-SCNC: 97 MMOL/L — LOW (ref 98–107)
CHLORIDE UR-SCNC: 27 MMOL/L — SIGNIFICANT CHANGE UP
CO2 SERPL-SCNC: 26 MMOL/L — SIGNIFICANT CHANGE UP (ref 22–31)
COLOR SPEC: YELLOW — SIGNIFICANT CHANGE UP
CREAT ?TM UR-MCNC: 143.5 MG/DL — SIGNIFICANT CHANGE UP
CREAT SERPL-MCNC: 1.44 MG/DL — HIGH (ref 0.5–1.3)
GLUCOSE BLDC GLUCOMTR-MCNC: 104 MG/DL — HIGH (ref 70–99)
GLUCOSE BLDC GLUCOMTR-MCNC: 104 MG/DL — HIGH (ref 70–99)
GLUCOSE BLDC GLUCOMTR-MCNC: 107 MG/DL — HIGH (ref 70–99)
GLUCOSE BLDC GLUCOMTR-MCNC: 118 MG/DL — HIGH (ref 70–99)
GLUCOSE SERPL-MCNC: 115 MG/DL — HIGH (ref 70–99)
GLUCOSE UR-MCNC: NEGATIVE — SIGNIFICANT CHANGE UP
HCT VFR BLD CALC: 37.6 % — LOW (ref 39–50)
HGB BLD-MCNC: 12.4 G/DL — LOW (ref 13–17)
KETONES UR-MCNC: NEGATIVE — SIGNIFICANT CHANGE UP
LDH SERPL L TO P-CCNC: 383 U/L — HIGH (ref 135–225)
LEUKOCYTE ESTERASE UR-ACNC: NEGATIVE — SIGNIFICANT CHANGE UP
MAGNESIUM SERPL-MCNC: 2.1 MG/DL — SIGNIFICANT CHANGE UP (ref 1.6–2.6)
MCHC RBC-ENTMCNC: 32.8 PG — SIGNIFICANT CHANGE UP (ref 27–34)
MCHC RBC-ENTMCNC: 33 % — SIGNIFICANT CHANGE UP (ref 32–36)
MCV RBC AUTO: 99.5 FL — SIGNIFICANT CHANGE UP (ref 80–100)
NITRITE UR-MCNC: NEGATIVE — SIGNIFICANT CHANGE UP
NRBC # FLD: 0 — SIGNIFICANT CHANGE UP
PH UR: 6 — SIGNIFICANT CHANGE UP (ref 5–8)
PHOSPHATE SERPL-MCNC: 4 MG/DL — SIGNIFICANT CHANGE UP (ref 2.5–4.5)
PLATELET # BLD AUTO: 364 K/UL — SIGNIFICANT CHANGE UP (ref 150–400)
PMV BLD: 10 FL — SIGNIFICANT CHANGE UP (ref 7–13)
POTASSIUM SERPL-MCNC: 4.3 MMOL/L — SIGNIFICANT CHANGE UP (ref 3.5–5.3)
POTASSIUM SERPL-SCNC: 4.3 MMOL/L — SIGNIFICANT CHANGE UP (ref 3.5–5.3)
POTASSIUM UR-SCNC: 50.1 MMOL/L — SIGNIFICANT CHANGE UP
PROT UR-MCNC: 10 — SIGNIFICANT CHANGE UP
PROT UR-MCNC: 13.4 MG/DL — SIGNIFICANT CHANGE UP
RBC # BLD: 3.78 M/UL — LOW (ref 4.2–5.8)
RBC # FLD: 12.1 % — SIGNIFICANT CHANGE UP (ref 10.3–14.5)
SODIUM SERPL-SCNC: 136 MMOL/L — SIGNIFICANT CHANGE UP (ref 135–145)
SODIUM UR-SCNC: 45 MMOL/L — SIGNIFICANT CHANGE UP
SP GR SPEC: 1.02 — SIGNIFICANT CHANGE UP (ref 1–1.04)
URATE SERPL-MCNC: 5.8 MG/DL — SIGNIFICANT CHANGE UP (ref 3.4–8.8)
URATE UR-MCNC: 27.8 MG/DL — SIGNIFICANT CHANGE UP
UROBILINOGEN FLD QL: SIGNIFICANT CHANGE UP
WBC # BLD: 7.18 K/UL — SIGNIFICANT CHANGE UP (ref 3.8–10.5)
WBC # FLD AUTO: 7.18 K/UL — SIGNIFICANT CHANGE UP (ref 3.8–10.5)

## 2018-12-20 PROCEDURE — 99223 1ST HOSP IP/OBS HIGH 75: CPT | Mod: GC

## 2018-12-20 PROCEDURE — 99233 SBSQ HOSP IP/OBS HIGH 50: CPT | Mod: GC

## 2018-12-20 PROCEDURE — 99232 SBSQ HOSP IP/OBS MODERATE 35: CPT

## 2018-12-20 RX ORDER — MEPERIDINE HYDROCHLORIDE 50 MG/ML
25 INJECTION INTRAMUSCULAR; INTRAVENOUS; SUBCUTANEOUS ONCE
Qty: 0 | Refills: 0 | Status: DISCONTINUED | OUTPATIENT
Start: 2018-12-20 | End: 2018-12-20

## 2018-12-20 RX ORDER — RITUXIMAB 10 MG/ML
825 INJECTION, SOLUTION INTRAVENOUS ONCE
Qty: 0 | Refills: 0 | Status: COMPLETED | OUTPATIENT
Start: 2018-12-20 | End: 2018-12-20

## 2018-12-20 RX ORDER — ALLOPURINOL 300 MG
300 TABLET ORAL DAILY
Qty: 0 | Refills: 0 | Status: DISCONTINUED | OUTPATIENT
Start: 2018-12-20 | End: 2018-12-31

## 2018-12-20 RX ORDER — ACETAMINOPHEN 500 MG
650 TABLET ORAL ONCE
Qty: 0 | Refills: 0 | Status: COMPLETED | OUTPATIENT
Start: 2018-12-20 | End: 2018-12-20

## 2018-12-20 RX ORDER — OXYCODONE HYDROCHLORIDE 5 MG/1
5 TABLET ORAL EVERY 6 HOURS
Qty: 0 | Refills: 0 | Status: DISCONTINUED | OUTPATIENT
Start: 2018-12-20 | End: 2018-12-27

## 2018-12-20 RX ORDER — OXYCODONE HYDROCHLORIDE 5 MG/1
10 TABLET ORAL EVERY 6 HOURS
Qty: 0 | Refills: 0 | Status: DISCONTINUED | OUTPATIENT
Start: 2018-12-20 | End: 2018-12-27

## 2018-12-20 RX ORDER — HYDROCORTISONE 20 MG
100 TABLET ORAL ONCE
Qty: 0 | Refills: 0 | Status: DISCONTINUED | OUTPATIENT
Start: 2018-12-20 | End: 2018-12-31

## 2018-12-20 RX ORDER — SODIUM CHLORIDE 9 MG/ML
1000 INJECTION INTRAMUSCULAR; INTRAVENOUS; SUBCUTANEOUS
Qty: 0 | Refills: 0 | Status: DISCONTINUED | OUTPATIENT
Start: 2018-12-20 | End: 2018-12-24

## 2018-12-20 RX ORDER — DIPHENHYDRAMINE HCL 50 MG
50 CAPSULE ORAL ONCE
Qty: 0 | Refills: 0 | Status: COMPLETED | OUTPATIENT
Start: 2018-12-20 | End: 2018-12-20

## 2018-12-20 RX ORDER — DIPHENHYDRAMINE HCL 50 MG
25 CAPSULE ORAL ONCE
Qty: 0 | Refills: 0 | Status: DISCONTINUED | OUTPATIENT
Start: 2018-12-20 | End: 2018-12-31

## 2018-12-20 RX ADMIN — Medication 650 MILLIGRAM(S): at 15:50

## 2018-12-20 RX ADMIN — SENNA PLUS 10 MILLILITER(S): 8.6 TABLET ORAL at 22:11

## 2018-12-20 RX ADMIN — HEPARIN SODIUM 5000 UNIT(S): 5000 INJECTION INTRAVENOUS; SUBCUTANEOUS at 22:11

## 2018-12-20 RX ADMIN — HEPARIN SODIUM 5000 UNIT(S): 5000 INJECTION INTRAVENOUS; SUBCUTANEOUS at 14:56

## 2018-12-20 RX ADMIN — Medication 100 MILLIGRAM(S): at 18:05

## 2018-12-20 RX ADMIN — Medication 50 MILLIGRAM(S): at 14:57

## 2018-12-20 RX ADMIN — OXYCODONE HYDROCHLORIDE 10 MILLIGRAM(S): 5 TABLET ORAL at 00:07

## 2018-12-20 RX ADMIN — Medication 650 MILLIGRAM(S): at 14:57

## 2018-12-20 RX ADMIN — HEPARIN SODIUM 5000 UNIT(S): 5000 INJECTION INTRAVENOUS; SUBCUTANEOUS at 05:17

## 2018-12-20 RX ADMIN — Medication 300 MILLIGRAM(S): at 14:56

## 2018-12-20 RX ADMIN — Medication 100 MILLIGRAM(S): at 05:18

## 2018-12-20 RX ADMIN — Medication 81 MILLIGRAM(S): at 14:56

## 2018-12-20 RX ADMIN — RITUXIMAB 825 MILLIGRAM(S): 10 INJECTION, SOLUTION INTRAVENOUS at 15:49

## 2018-12-20 RX ADMIN — SODIUM CHLORIDE 75 MILLILITER(S): 9 INJECTION INTRAMUSCULAR; INTRAVENOUS; SUBCUTANEOUS at 14:56

## 2018-12-20 NOTE — PROGRESS NOTE ADULT - SUBJECTIVE AND OBJECTIVE BOX
Dr. Rodríguez pager [phone]    Patient is a 68y old  Male who presents with a chief complaint of respiratory failure (20 Dec 2018 14:48)      SUBJECTIVE / OVERNIGHT EVENTS: pt seen at 12:30p- upset - still wants to go home by 12/24  states he can't breathe bc mouth is dry    MEDICATIONS  (STANDING):  allopurinol 300 milliGRAM(s) Oral daily  aspirin  chewable 81 milliGRAM(s) Oral daily  dextrose 5%. 1000 milliLiter(s) (50 mL/Hr) IV Continuous <Continuous>  dextrose 50% Injectable 12.5 Gram(s) IV Push once  dextrose 50% Injectable 25 Gram(s) IV Push once  dextrose 50% Injectable 25 Gram(s) IV Push once  docusate sodium Liquid 100 milliGRAM(s) Oral two times a day  heparin  Injectable 5000 Unit(s) SubCutaneous every 8 hours  insulin lispro (HumaLOG) corrective regimen sliding scale   SubCutaneous every 6 hours  senna Syrup 10 milliLiter(s) Oral at bedtime  sodium chloride 0.9%. 1000 milliLiter(s) (75 mL/Hr) IV Continuous <Continuous>    MEDICATIONS  (PRN):  acetaminophen    Suspension .. 650 milliGRAM(s) Oral every 6 hours PRN Mild Pain (1 - 3)  dextrose 40% Gel 15 Gram(s) Oral once PRN Blood Glucose LESS THAN 70 milliGRAM(s)/deciliter  diphenhydrAMINE   Injectable 25 milliGRAM(s) IV Push once PRN REACTION  glucagon  Injectable 1 milliGRAM(s) IntraMuscular once PRN Glucose LESS THAN 70 milligrams/deciliter  hydrocortisone sodium succinate Injectable 100 milliGRAM(s) IV Push once PRN REACTION  meperidine     Injectable 25 milliGRAM(s) IV Push once PRN RIGORS  oxyCODONE    Solution 10 milliGRAM(s) Oral every 6 hours PRN Severe Pain (7 - 10)  oxyCODONE    Solution 5 milliGRAM(s) Oral every 6 hours PRN Moderate Pain (4 - 6)      Meds ordered within last 24hours  oxyCODONE    Solution:   10 milliGRAM(s), Oral via PEG tube, every 6 hours, PRN for Severe Pain (7 - 10)  Special Instructions: hold for sedation hold for RR <12 do not stack medications  VIA PEG TUBE  Administration Instructions: This is a Look-alike/Sound-alike Medication (12-20 @ 09:03)  oxyCODONE    Solution:   5 milliGRAM(s), Oral via PEG tube, every 6 hours, PRN for Moderate Pain (4 - 6)  Special Instructions: hold for sedation hold for RR <12 do not stack medications  VIA PEG TUBE  Administration Instructions: This is a Look-alike/Sound-alike Medication (12-20 @ 09:03)  allopurinol: [Known as ZYLOPRIM]  300 milliGRAM(s), Oral via PEG tube, daily  Provider's Contact #: [phone] (12-20 @ 11:32)  sodium chloride 0.9%.: Solution, 1000 milliLiter(s) infuse at 75 mL/Hr, Stop After 24 Hours (12-20 @ 12:05)  acetaminophen    Suspension ..: [Known as TYLENOL Suspension..]  650 milliGRAM(s), Oral, once, Stop After 1 Doses  Special Instructions: PRE-MEDICATION GIVEN VIA PEG 12/20/2018  TO BE ADMINISTERED BY IV CHEMO NURSE ONLY  Provider's Contact #: [phone] (12-20 @ 12:45)  diphenhydrAMINE   Injectable: [Known as BENADRYL Injectable]  50 milliGRAM(s), IV Push, once, Stop After 1 Doses  Special Instructions: PRE-MEDICATION GIVEN 12/20/2018  TO BE GIVEN BY IV CHEMO NURSE ONLY  Provider's Contact #: [phone] (12-20 @ 12:45)  hydrocortisone sodium succinate Injectable: [Known as Solu-CORTEF]  100 milliGRAM(s), IV Push, once, PRN for REACTION, Stop After 1 Doses  Special Instructions: PRN REACTION 12/20/2018  Provider's Contact #: [phone] (12-20 @ 12:45)  diphenhydrAMINE   Injectable: [Known as BENADRYL Injectable]  25 milliGRAM(s), IV Push, once, PRN for REACTION, Stop After 1 Doses  Special Instructions: PRN REACTION 12/20/2018  Provider's Contact #: [phone] (12-20 @ 12:45)  meperidine     Injectable: [Known as DEMEROL Injectable]  25 milliGRAM(s), IV Push, once, PRN for RIGORS, Stop After 1 Doses  Special Instructions: PRN RIGORS 12/20/2018  Provider's Contact #: [phone] (12-20 @ 12:45)  riTUXimab IVPB (eMAR): Ordered as RITUXAN IVPB (eMAR)  825 milliGRAM(s) in sodium chloride 0.9% 742.5 milliLiter(s), IV Intermittent, once, infuse over 5 Hour(s), Stop After 1 Doses  Special Instructions: TO BE GIVEN DAY 1 12/20/2018  TO BE ADMINISTERED BY IV CHEMO NURSE ONLY  Administration Instructions: Initiate infusion at a rate of 50 mG/Hr. In the absence of infusion toxicity, increase infusion rate by 50 mG/Hr increments every 30 minutes, to a maximum of 400 mG/Hr.  This is a Look-alike/Sound-alike Medication  Provider's Contact #: [phone]  Cycle C1 - Day D1, Diagnosis: DPBCP, Study #: R-CEOP (12-20 @ 12:45)      T(C): 36.7 (12-20-18 @ 15:46), Max: 36.9 (12-20-18 @ 05:16)  HR: 82 (12-20-18 @ 15:46) (82 - 97)  BP: 111/73 (12-20-18 @ 15:46) (102/74 - 140/62)  RR: 18 (12-20-18 @ 15:46) (18 - 19)  SpO2: 100% (12-20-18 @ 15:46) (97% - 100%)    CAPILLARY BLOOD GLUCOSE  104 (19 Dec 2018 18:13)      POCT Blood Glucose.: 107 mg/dL (20 Dec 2018 07:10)  POCT Blood Glucose.: 121 mg/dL (19 Dec 2018 23:48)  POCT Blood Glucose.: 104 mg/dL (19 Dec 2018 18:05)    I&O's Summary    19 Dec 2018 07:01  -  20 Dec 2018 07:00  --------------------------------------------------------  IN: 2450 mL / OUT: 525 mL / NET: 1925 mL    20 Dec 2018 07:01  -  20 Dec 2018 16:01  --------------------------------------------------------  IN: 900 mL / OUT: 0 mL / NET: 900 mL        PHYSICAL EXAM:  GENERAL: NAD  HEENT: protuberant tongue  CHEST/LUNG: Clear to auscultation bilaterally; No wheeze  HEART: Regular rate and rhythm; No murmurs, rubs, or gallops  ABDOMEN: Soft, Nontender, Nondistended; Bowel sounds present  EXTREMITIES:  No clubbing, cyanosis, or edema        LABS:                        12.4   7.18  )-----------( 364      ( 20 Dec 2018 10:00 )             37.6     12-20    136  |  97<L>  |  20  ----------------------------<  115<H>  4.3   |  26  |  1.44<H>    Ca    9.6      20 Dec 2018 10:00  Phos  4.0     12-20  Mg     2.1     12-20                RADIOLOGY & ADDITIONAL TESTS:    Imaging Personally Reviewed:    Consultant(s) Notes Reviewed:      Care Discussed with Consultants/Other Providers:

## 2018-12-20 NOTE — SPEAKING VALVE EVALUATION - DIAGNOSTIC IMPRESSIONS
Placement of PMV was made by this clinician for the purposes of today's assessment. The patient was able to tolerate PMV placement for ~5 minutes. During this time, the patient engaged in automatic speech tasks (i.e. counting) and engaged in low-level conversational discourse with this clinician with audible voicing. He then reported feeling tired, at which time the PMV was removed and the patient and RN were educated on only wearing the PMV during waking hours.

## 2018-12-20 NOTE — CONSULT NOTE ADULT - SUBJECTIVE AND OBJECTIVE BOX
[facility] Division of Kidney Diseases & Hypertension  INITIAL CONSULT NOTE  [phone]--------------------------------------------------------------------------------    HPI: 68 year old male with h/o DM, HTN presented to [facility] on 12/4/18 with complaints of SOB. Patient was found to have large left tonsillar mass invading base of tongue and has tracheostomy done on 12/5/18. 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    Nephrology was consulted for JAVIER. On review of labs prior to admisson, Scr. has been WNL. Scr. on admission was also noted to be WNL(1.22) on 12/4/18. However since 12/13/18, Scr. was noted to be trending up and is now elevated at 1.44 on 12/20/18. On review of inpatient chart, patient was noted to have relatively low BP readings since 12/13. Patient has been on losartan and HCTZ for BP control. Patient also had CT chest and abdomen with IV contrast as well on 12/18/18. Patient unable to provide further history.         PAST HISTORY  --------------------------------------------------------------------------------  PAST MEDICAL & SURGICAL HISTORY:  Sleep apnea  Asthma: Denies recent hospitalizations for asthma  Localized swelling, mass and lump, head  Tonsil cancer  DM (diabetes mellitus): Not on any medications  HTN (hypertension)  History of surgery: Right knee replacement - surgery  Forearm fracture    FAMILY HISTORY:  No pertinent family history in first degree relatives    PAST SOCIAL HISTORY:    ALLERGIES & MEDICATIONS  --------------------------------------------------------------------------------  Allergies    No Known Allergies    Intolerances      Standing Inpatient Medications  allopurinol 300 milliGRAM(s) Oral daily  aspirin  chewable 81 milliGRAM(s) Oral daily  dextrose 5%. 1000 milliLiter(s) IV Continuous <Continuous>  dextrose 50% Injectable 12.5 Gram(s) IV Push once  dextrose 50% Injectable 25 Gram(s) IV Push once  dextrose 50% Injectable 25 Gram(s) IV Push once  docusate sodium Liquid 100 milliGRAM(s) Oral two times a day  heparin  Injectable 5000 Unit(s) SubCutaneous every 8 hours  insulin lispro (HumaLOG) corrective regimen sliding scale   SubCutaneous every 6 hours  senna Syrup 10 milliLiter(s) Oral at bedtime  sodium chloride 0.9%. 1000 milliLiter(s) IV Continuous <Continuous>    PRN Inpatient Medications  acetaminophen    Suspension .. 650 milliGRAM(s) Oral every 6 hours PRN  dextrose 40% Gel 15 Gram(s) Oral once PRN  diphenhydrAMINE   Injectable 25 milliGRAM(s) IV Push once PRN  glucagon  Injectable 1 milliGRAM(s) IntraMuscular once PRN  hydrocortisone sodium succinate Injectable 100 milliGRAM(s) IV Push once PRN  meperidine     Injectable 25 milliGRAM(s) IV Push once PRN  oxyCODONE    Solution 10 milliGRAM(s) Oral every 6 hours PRN  oxyCODONE    Solution 5 milliGRAM(s) Oral every 6 hours PRN      REVIEW OF SYSTEMS  --------------------------------------------------------------------------------  Unable to obtain.     VITALS/PHYSICAL EXAM  --------------------------------------------------------------------------------  T(C): 36.7 (12-20-18 @ 15:46), Max: 36.9 (12-20-18 @ 05:16)  HR: 87 (12-20-18 @ 16:29) (82 - 97)  BP: 114/77 (12-20-18 @ 16:29) (102/74 - 140/62)  RR: 18 (12-20-18 @ 16:29) (18 - 20)  SpO2: 100% (12-20-18 @ 16:29) (97% - 100%)  Wt(kg): --        12-19-18 @ 07:01  -  12-20-18 @ 07:00  --------------------------------------------------------  IN: 2450 mL / OUT: 525 mL / NET: 1925 mL    12-20-18 @ 07:01  -  12-20-18 @ 16:56  --------------------------------------------------------  IN: 900 mL / OUT: 0 mL / NET: 900 mL      Physical Exam:    	Gen: NAD  	HEENT: sclera anicteric; tracheostomy tube present.   	Pulm: CTA B/L  	CV:  S1S2  	Abd: +BS, soft   	Ext: No B/L Lower ext edema  	Neuro: No focal deficits  	Skin: Warm and dry     LABS/STUDIES  --------------------------------------------------------------------------------              12.4   7.18  >-----------<  364      [12-20-18 @ 10:00]              37.6     136  |  97  |  20  ----------------------------<  115      [12-20-18 @ 10:00]  4.3   |  26  |  1.44        Ca     9.6     [12-20-18 @ 10:00]      Mg     2.1     [12-20-18 @ 10:00]      Phos  4.0     [12-20-18 @ 10:00]          Uric acid 5.8      [12-20-18 @ 10:00]        [12-20-18 @ 10:00]    Creatinine Trend:  SCr 1.44 [12-20 @ 10:00]  SCr 1.26 [12-19 @ 06:45]  SCr 1.21 [12-18 @ 06:30]  SCr 1.22 [12-17 @ 05:55]  SCr 1.11 [12-15 @ 06:05]          HBsAb Nonreactive      [12-15-18 @ 06:05]  HBsAg NEGATIVE      [12-15-18 @ 06:05]  HBcAb Nonreactive      [12-15-18 @ 06:05]  HCV 11.88, Reactive      [12-15-18 @ 06:05] [facility] Division of Kidney Diseases & Hypertension  INITIAL CONSULT NOTE  [phone]--------------------------------------------------------------------------------    HPI: 68 year old male with h/o DM, HTN presented to [facility] on 12/4/18 with complaints of SOB. Patient was found to have large left tonsillar mass invading base of tongue and has tracheostomy done on 12/5/18. 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    Nephrology was consulted for JVAIER. On review of labs prior to admisson, Scr. has been WNL. Scr. on admission was also noted to be WNL(1.22) on 12/4/18. However since 12/13/18, Scr. was noted to be trending up and is now elevated at 1.44 on 12/20/18. On review of inpatient chart, patient was noted to have relatively low BP readings since 12/13. Patient has been on losartan and HCTZ for BP control. Patient also had CT chest and abdomen with IV contrast as well on 12/18/18. Patient unable to provide further history.         PAST HISTORY  --------------------------------------------------------------------------------  PAST MEDICAL & SURGICAL HISTORY:  Sleep apnea  Asthma: Denies recent hospitalizations for asthma  Localized swelling, mass and lump, head  Tonsil cancer  DM (diabetes mellitus): Not on any medications  HTN (hypertension)  History of surgery: Right knee replacement - surgery  Forearm fracture    FAMILY HISTORY:  No pertinent family history in first degree relatives    PAST SOCIAL HISTORY: unable to obtain.     ALLERGIES & MEDICATIONS  --------------------------------------------------------------------------------  Allergies    No Known Allergies    Intolerances      Standing Inpatient Medications  allopurinol 300 milliGRAM(s) Oral daily  aspirin  chewable 81 milliGRAM(s) Oral daily  dextrose 5%. 1000 milliLiter(s) IV Continuous <Continuous>  dextrose 50% Injectable 12.5 Gram(s) IV Push once  dextrose 50% Injectable 25 Gram(s) IV Push once  dextrose 50% Injectable 25 Gram(s) IV Push once  docusate sodium Liquid 100 milliGRAM(s) Oral two times a day  heparin  Injectable 5000 Unit(s) SubCutaneous every 8 hours  insulin lispro (HumaLOG) corrective regimen sliding scale   SubCutaneous every 6 hours  senna Syrup 10 milliLiter(s) Oral at bedtime  sodium chloride 0.9%. 1000 milliLiter(s) IV Continuous <Continuous>    PRN Inpatient Medications  acetaminophen    Suspension .. 650 milliGRAM(s) Oral every 6 hours PRN  dextrose 40% Gel 15 Gram(s) Oral once PRN  diphenhydrAMINE   Injectable 25 milliGRAM(s) IV Push once PRN  glucagon  Injectable 1 milliGRAM(s) IntraMuscular once PRN  hydrocortisone sodium succinate Injectable 100 milliGRAM(s) IV Push once PRN  meperidine     Injectable 25 milliGRAM(s) IV Push once PRN  oxyCODONE    Solution 10 milliGRAM(s) Oral every 6 hours PRN  oxyCODONE    Solution 5 milliGRAM(s) Oral every 6 hours PRN      REVIEW OF SYSTEMS  --------------------------------------------------------------------------------  Unable to obtain.     VITALS/PHYSICAL EXAM  --------------------------------------------------------------------------------  T(C): 36.7 (12-20-18 @ 15:46), Max: 36.9 (12-20-18 @ 05:16)  HR: 87 (12-20-18 @ 16:29) (82 - 97)  BP: 114/77 (12-20-18 @ 16:29) (102/74 - 140/62)  RR: 18 (12-20-18 @ 16:29) (18 - 20)  SpO2: 100% (12-20-18 @ 16:29) (97% - 100%)  Wt(kg): --        12-19-18 @ 07:01  -  12-20-18 @ 07:00  --------------------------------------------------------  IN: 2450 mL / OUT: 525 mL / NET: 1925 mL    12-20-18 @ 07:01  -  12-20-18 @ 16:56  --------------------------------------------------------  IN: 900 mL / OUT: 0 mL / NET: 900 mL      Physical Exam:    	Gen: NAD  	HEENT: sclera anicteric; tracheostomy tube present.   	Pulm: CTA B/L  	CV:  S1S2  	Abd: +BS, soft   	Ext: trace edema  	Neuro: drowsy but follows command  	Skin: Warm and dry     LABS/STUDIES  --------------------------------------------------------------------------------              12.4   7.18  >-----------<  364      [12-20-18 @ 10:00]              37.6     136  |  97  |  20  ----------------------------<  115      [12-20-18 @ 10:00]  4.3   |  26  |  1.44        Ca     9.6     [12-20-18 @ 10:00]      Mg     2.1     [12-20-18 @ 10:00]      Phos  4.0     [12-20-18 @ 10:00]          Uric acid 5.8      [12-20-18 @ 10:00]        [12-20-18 @ 10:00]    Creatinine Trend:  SCr 1.44 [12-20 @ 10:00]  SCr 1.26 [12-19 @ 06:45]  SCr 1.21 [12-18 @ 06:30]  SCr 1.22 [12-17 @ 05:55]  SCr 1.11 [12-15 @ 06:05]          HBsAb Nonreactive      [12-15-18 @ 06:05]  HBsAg NEGATIVE      [12-15-18 @ 06:05]  HBcAb Nonreactive      [12-15-18 @ 06:05]  HCV 11.88, Reactive      [12-15-18 @ 06:05]

## 2018-12-20 NOTE — PROGRESS NOTE ADULT - SUBJECTIVE AND OBJECTIVE BOX
Pt seen and examined. Tolerating tube feeds. Tolerates finger occlusion. Sutures and steri strips removed from left neck incision    Afebrile   NAD, awake and alert   OC/OP: tongue large protruding out of OC w/ interval improvement no bleeding  Neck: 6CFS in place secured with trach tie, secretions suctioned, 6cm conglomerate of left level II/III neck nodes  abdomen: peg in place     A/P  68M w/ L tonsillar mass who presented to the ED with SOB with inability to tolerate his secretions s/p DLB. Final path suggest B cell lymphoma. Now on medicine service for chemotherapy. Neck sutures removed.   -G-tube feeds  -pain control prn  -home meds  -routine trach care, trach teaching  -SLP for PMV   -peg/trach teaching  -DM control -FSG, ISS  -sqh   -PT/OT  -dispo: SW assistance appreciated, pt will need PEG tube for at least 3 months, failed MBS; scripts for supplies given to case management

## 2018-12-20 NOTE — CONSULT NOTE ADULT - ASSESSMENT
68 year old male found to have hemodynamically mediated JAVIER in setting of IV contrast use, ARB use and low BP.

## 2018-12-20 NOTE — PROGRESS NOTE ADULT - SUBJECTIVE AND OBJECTIVE BOX
INTERVAL HPI/OVERNIGHT EVENTS:  Patient S&E at bedside. No o/n events, s/p BM biopsy today, no pain at the site. Plan for rituxan today.     MEDICATIONS  (STANDING):  aspirin  chewable 81 milliGRAM(s) Oral daily  dextrose 5%. 1000 milliLiter(s) (50 mL/Hr) IV Continuous <Continuous>  dextrose 50% Injectable 12.5 Gram(s) IV Push once  dextrose 50% Injectable 25 Gram(s) IV Push once  dextrose 50% Injectable 25 Gram(s) IV Push once  docusate sodium Liquid 100 milliGRAM(s) Oral two times a day  heparin  Injectable 5000 Unit(s) SubCutaneous every 8 hours  insulin lispro (HumaLOG) corrective regimen sliding scale   SubCutaneous every 6 hours  senna Syrup 10 milliLiter(s) Oral at bedtime  sodium chloride 0.9%. 1000 milliLiter(s) (50 mL/Hr) IV Continuous <Continuous>    MEDICATIONS  (PRN):  acetaminophen    Suspension .. 650 milliGRAM(s) Oral every 6 hours PRN Mild Pain (1 - 3)  dextrose 40% Gel 15 Gram(s) Oral once PRN Blood Glucose LESS THAN 70 milliGRAM(s)/deciliter  glucagon  Injectable 1 milliGRAM(s) IntraMuscular once PRN Glucose LESS THAN 70 milligrams/deciliter  oxyCODONE    Solution 10 milliGRAM(s) Oral every 6 hours PRN Severe Pain (7 - 10)  oxyCODONE    Solution 5 milliGRAM(s) Oral every 6 hours PRN Moderate Pain (4 - 6)    Allergies    No Known Allergies    Intolerances    Vital Signs Last 24 Hrs  T(C): 36.9 (20 Dec 2018 05:16), Max: 36.9 (20 Dec 2018 05:16)  T(F): 98.5 (20 Dec 2018 05:16), Max: 98.5 (20 Dec 2018 05:16)  HR: 97 (20 Dec 2018 05:16) (84 - 97)  BP: 140/62 (20 Dec 2018 05:16) (102/74 - 140/62)  BP(mean): --  RR: 19 (20 Dec 2018 05:16) (17 - 19)  SpO2: 100% (20 Dec 2018 05:16) (97% - 100%)    PHYSICAL EXAM:    General: NAD  HEENT: large tongue protruding out, has a trach  CHEST/LUNG: Clear to auscultation bilaterally; No wheeze  HEART: RRR, S1/S2  ABDOMEN: +BS, soft, NT/ND  EXTREMITIES: no LE edema  NEUROLOGY: non-focal    LABS:                        12.4   7.18  )-----------( 364      ( 20 Dec 2018 10:00 )             37.6   12-20    136  |  97<L>  |  20  ----------------------------<  115<H>  4.3   |  26  |  1.44<H>    Ca    9.6      20 Dec 2018 10:00  Phos  4.0     12-20  Mg     2.1     12-20    RADIOLOGY & ADDITIONAL TESTS:  Studies reviewed.

## 2018-12-20 NOTE — SPEAKING VALVE EVALUATION - RECOMMENDATIONS
This department to continue to follow with placement of PMV to allow for improved communicative functioning.

## 2018-12-20 NOTE — PROGRESS NOTE ADULT - ASSESSMENT
69 yo male PMHx HTN, diet controlled DM type 2, asthma, recent tonsillar mass Bx in clinic, admitted for airway protection s/p trach and peg- was on ENT service- biopsy shows DLBCL- transferred to medicine for chemo- pt had to be convinced to stay inpatient to get the chemo- on cont pulse ox d/t trach- pt remains upset about being in the hospital- spoke w/ cousin Travis- she said that he doesn't like hospitals

## 2018-12-20 NOTE — CHART NOTE - NSCHARTNOTEFT_GEN_A_CORE
notified by 8s RN patient had questionable reaction (chills) to chemo Chemo Fellow is aware and was treated already by chemo RN with benadryl, demerol and hydrocortisone. Patient is stable at this time, will continue to monitor. Chemo RN at bedside.  VSS

## 2018-12-20 NOTE — PROGRESS NOTE ADULT - PROBLEM SELECTOR PLAN 2
- L tonsillar mass, management as per ENT.   - appreciate heme/onc input  -speech for passy-ambar valve  - heme to do bone marrow biopsy w/ plans to start rx 12/20

## 2018-12-20 NOTE — SPEAKING VALVE EVALUATION - COMMENTS
The patient was seen this PM for a clinical assessment of communicative function/possible implementation of a speaking valve. The patient was received awake sitting upright in a chair, though reporting feeling tired. Chemo RN present at bedside throughout this evaluation and reporting that the patient had just received Benadryl. Patient currently with size 6 cuffless trach in place. Per charting, the patient is a 67 y/o M w/ L tonsillar mass who presented to the ED with SOB with inability to tolerate his secretions. He is now s/p emergent tracheostomy on 12/4 and s/p PEG 12/7. He was seen by this department for a clinical swallow evaluation on 12/6 (please see full report for details), at which time a PO diet could not be recommended. The patient demonstrated audible phonation upon clinical digital occlusion of trach site though he is exhibiting a dysarthria which is affecting his overall speech intelligibility. Discussed results and recommendations from this evaluation with the patient, chemo RN, and primary RN on unit. The patient was seen this PM for a clinical assessment of communicative function/possible implementation of a speaking valve. The patient was received awake sitting upright in a chair, though reporting feeling tired. Chemo RN present at bedside throughout this evaluation and reporting that the patient had just received Benadryl. Patient currently with size 6 cuffless trach in place. Per charting, the patient is a 67 y/o M w/ L tonsillar mass who presented to the ED with SOB with inability to tolerate his secretions. He is now s/p emergent tracheostomy on 12/4 and s/p PEG 12/7. He was seen by this department for a clinical swallow evaluation on 12/6 (please see full report for details), at which time a PO diet could not be recommended. The patient demonstrated audible phonation upon clinician digital occlusion of trach site, though he is exhibiting a dysarthria which is negatively affecting his overall speech intelligibility. Discussed results and recommendations from this evaluation with the patient, chemo RN, primary RN on unit, and call out to ENT.

## 2018-12-20 NOTE — SPEAKING VALVE EVALUATION - REMOVE VALVE FOR
Aerosolized respiratory treatments/Increased work of breathing/Evidence of air trapping/Sleep/SpO2 < 92%/Diaphoresis/Subjective complaints/Increased HR (1.5 x baseline)/Excessive coughing

## 2018-12-20 NOTE — PROGRESS NOTE ADULT - ASSESSMENT
68M w/ h/o asthma, htn, DM (diet controlled) and recently diagnosed left sided tonsillar mass s/p non-diagnostic tonsil biopsy in the office 11/13/18 who presented to the ED with SOB s/p tracheostomy and pathology now confirming DLBCL GCB subtype    1. DLBCL:   - core LN biopsy consistent with DLBCL GCB subtype, FISH negative for BCL6, MYC, MYC/IGH and IGH/BLC2 translocation  - also had an excisional LN bx on 12/15  - CT C/A/P with indeterminate adrenal nodules only   - hep panel negative, TTE with EF of 35%  - s/p BM biopsy on 12/19/18  - Treatment plan is for R-CEOP regimen  - Day 1 (12/20): Rituxan and prednisone 100 mg daily x 5 days  - Day 2 (12/21): cytoxan and vincristine  - Day 2 - Day 4: etoposide (ending on 12/23)  - start neupogen 24 hours after completion of chemotherapy  - started on allopurinol  - please check tumor lysis labs (CMP with Phos, LDH, uric acid) daily    2. JAVIER: Cr is 1.44 today (up from 1.26), ? pre-renal  - would start IVF at 75 cc/hr for now given EF of 35%  - monitor tumor lysis labs, already on allopurinol  - if any concern for tumor lysis will give rasburicase  - if continues to have uptrending Cr tomorrow then may have to or dose adjust chemotherapy    d/w primary team    James Boykin, PGY-5  Hematology-Oncology Fellow  Pager: [phone] ([facility]), [phone] ([facility])  (After 5 pm or on weekends please page the on-call fellow)

## 2018-12-20 NOTE — PROGRESS NOTE ADULT - SUBJECTIVE AND OBJECTIVE BOX
patient has tonsil lymphoma and required tracheostomy and peg tube placement. Will need to have both for at least 3 months.

## 2018-12-21 ENCOUNTER — OUTPATIENT (OUTPATIENT)
Dept: OUTPATIENT SERVICES | Facility: HOSPITAL | Age: 68
LOS: 1 days | Discharge: HOME | End: 2018-12-21

## 2018-12-21 DIAGNOSIS — C83.39 DIFFUSE LARGE B-CELL LYMPHOMA, EXTRANODAL AND SOLID ORGAN SITES: ICD-10-CM

## 2018-12-21 DIAGNOSIS — C09.0 MALIGNANT NEOPLASM OF TONSILLAR FOSSA: ICD-10-CM

## 2018-12-21 DIAGNOSIS — Z51.89 ENCOUNTER FOR OTHER SPECIFIED AFTERCARE: ICD-10-CM

## 2018-12-21 DIAGNOSIS — S52.90XA UNSPECIFIED FRACTURE OF UNSPECIFIED FOREARM, INITIAL ENCOUNTER FOR CLOSED FRACTURE: Chronic | ICD-10-CM

## 2018-12-21 DIAGNOSIS — Z98.890 OTHER SPECIFIED POSTPROCEDURAL STATES: Chronic | ICD-10-CM

## 2018-12-21 DIAGNOSIS — R13.10 DYSPHAGIA, UNSPECIFIED: ICD-10-CM

## 2018-12-21 DIAGNOSIS — C83.31 DIFFUSE LARGE B-CELL LYMPHOMA, LYMPH NODES OF HEAD, FACE, AND NECK: ICD-10-CM

## 2018-12-21 DIAGNOSIS — I50.20 UNSPECIFIED SYSTOLIC (CONGESTIVE) HEART FAILURE: ICD-10-CM

## 2018-12-21 DIAGNOSIS — Z51.11 ENCOUNTER FOR ANTINEOPLASTIC CHEMOTHERAPY: ICD-10-CM

## 2018-12-21 LAB
ALBUMIN SERPL ELPH-MCNC: 3.3 G/DL — SIGNIFICANT CHANGE UP (ref 3.3–5)
ALP SERPL-CCNC: 62 U/L — SIGNIFICANT CHANGE UP (ref 40–120)
ALT FLD-CCNC: 11 U/L — SIGNIFICANT CHANGE UP (ref 4–41)
AST SERPL-CCNC: 17 U/L — SIGNIFICANT CHANGE UP (ref 4–40)
BILIRUB SERPL-MCNC: 0.2 MG/DL — SIGNIFICANT CHANGE UP (ref 0.2–1.2)
BUN SERPL-MCNC: 21 MG/DL — SIGNIFICANT CHANGE UP (ref 7–23)
CALCIUM SERPL-MCNC: 9.6 MG/DL — SIGNIFICANT CHANGE UP (ref 8.4–10.5)
CHLORIDE SERPL-SCNC: 99 MMOL/L — SIGNIFICANT CHANGE UP (ref 98–107)
CO2 SERPL-SCNC: 24 MMOL/L — SIGNIFICANT CHANGE UP (ref 22–31)
CREAT SERPL-MCNC: 1.3 MG/DL — SIGNIFICANT CHANGE UP (ref 0.5–1.3)
GLUCOSE BLDC GLUCOMTR-MCNC: 145 MG/DL — HIGH (ref 70–99)
GLUCOSE BLDC GLUCOMTR-MCNC: 145 MG/DL — HIGH (ref 70–99)
GLUCOSE BLDC GLUCOMTR-MCNC: 166 MG/DL — HIGH (ref 70–99)
GLUCOSE SERPL-MCNC: 99 MG/DL — SIGNIFICANT CHANGE UP (ref 70–99)
HCT VFR BLD CALC: 24.5 % — LOW (ref 39–50)
HCT VFR BLD CALC: 34.6 % — LOW (ref 39–50)
HCV RNA FLD QL NAA+PROBE: SIGNIFICANT CHANGE UP
HCV RNA SPEC QL PROBE+SIG AMP: NOT DETECTED — SIGNIFICANT CHANGE UP
HGB BLD-MCNC: 11.6 G/DL — LOW (ref 13–17)
HGB BLD-MCNC: 8.3 G/DL — LOW (ref 13–17)
LDH SERPL L TO P-CCNC: 353 U/L — HIGH (ref 135–225)
MAGNESIUM SERPL-MCNC: 2.1 MG/DL — SIGNIFICANT CHANGE UP (ref 1.6–2.6)
MCHC RBC-ENTMCNC: 33.3 PG — SIGNIFICANT CHANGE UP (ref 27–34)
MCHC RBC-ENTMCNC: 33.5 % — SIGNIFICANT CHANGE UP (ref 32–36)
MCHC RBC-ENTMCNC: 33.6 PG — SIGNIFICANT CHANGE UP (ref 27–34)
MCHC RBC-ENTMCNC: 33.9 % — SIGNIFICANT CHANGE UP (ref 32–36)
MCV RBC AUTO: 99.2 FL — SIGNIFICANT CHANGE UP (ref 80–100)
MCV RBC AUTO: 99.4 FL — SIGNIFICANT CHANGE UP (ref 80–100)
NRBC # FLD: 0 — SIGNIFICANT CHANGE UP
NRBC # FLD: 0 — SIGNIFICANT CHANGE UP
PHOSPHATE SERPL-MCNC: 3.1 MG/DL — SIGNIFICANT CHANGE UP (ref 2.5–4.5)
PLATELET # BLD AUTO: 274 K/UL — SIGNIFICANT CHANGE UP (ref 150–400)
PLATELET # BLD AUTO: 361 K/UL — SIGNIFICANT CHANGE UP (ref 150–400)
PMV BLD: 10.6 FL — SIGNIFICANT CHANGE UP (ref 7–13)
PMV BLD: 11 FL — SIGNIFICANT CHANGE UP (ref 7–13)
POTASSIUM SERPL-MCNC: 4.1 MMOL/L — SIGNIFICANT CHANGE UP (ref 3.5–5.3)
POTASSIUM SERPL-SCNC: 4.1 MMOL/L — SIGNIFICANT CHANGE UP (ref 3.5–5.3)
PROT SERPL-MCNC: 7.6 G/DL — SIGNIFICANT CHANGE UP (ref 6–8.3)
RBC # BLD: 2.47 M/UL — LOW (ref 4.2–5.8)
RBC # BLD: 3.48 M/UL — LOW (ref 4.2–5.8)
RBC # FLD: 12 % — SIGNIFICANT CHANGE UP (ref 10.3–14.5)
RBC # FLD: 12.3 % — SIGNIFICANT CHANGE UP (ref 10.3–14.5)
SODIUM SERPL-SCNC: 137 MMOL/L — SIGNIFICANT CHANGE UP (ref 135–145)
SURGICAL PATHOLOGY STUDY: SIGNIFICANT CHANGE UP
URATE SERPL-MCNC: 5.2 MG/DL — SIGNIFICANT CHANGE UP (ref 3.4–8.8)
WBC # BLD: 7.21 K/UL — SIGNIFICANT CHANGE UP (ref 3.8–10.5)
WBC # BLD: 8.49 K/UL — SIGNIFICANT CHANGE UP (ref 3.8–10.5)
WBC # FLD AUTO: 7.21 K/UL — SIGNIFICANT CHANGE UP (ref 3.8–10.5)
WBC # FLD AUTO: 8.49 K/UL — SIGNIFICANT CHANGE UP (ref 3.8–10.5)

## 2018-12-21 PROCEDURE — 99232 SBSQ HOSP IP/OBS MODERATE 35: CPT | Mod: GC

## 2018-12-21 PROCEDURE — 76770 US EXAM ABDO BACK WALL COMP: CPT | Mod: 26

## 2018-12-21 PROCEDURE — 99233 SBSQ HOSP IP/OBS HIGH 50: CPT | Mod: GC

## 2018-12-21 PROCEDURE — 99232 SBSQ HOSP IP/OBS MODERATE 35: CPT

## 2018-12-21 RX ORDER — ETOPOSIDE 20 MG/ML
110 VIAL (ML) INTRAVENOUS ONCE
Qty: 0 | Refills: 0 | Status: COMPLETED | OUTPATIENT
Start: 2018-12-21 | End: 2018-12-21

## 2018-12-21 RX ORDER — CYCLOPHOSPHAMIDE 100 MG
1650 VIAL (EA) INTRAVENOUS ONCE
Qty: 0 | Refills: 0 | Status: COMPLETED | OUTPATIENT
Start: 2018-12-21 | End: 2018-12-21

## 2018-12-21 RX ORDER — FOSAPREPITANT DIMEGLUMINE 150 MG/5ML
150 INJECTION, POWDER, LYOPHILIZED, FOR SOLUTION INTRAVENOUS ONCE
Qty: 0 | Refills: 0 | Status: COMPLETED | OUTPATIENT
Start: 2018-12-21 | End: 2018-12-21

## 2018-12-21 RX ORDER — VINCRISTINE SULFATE 1 MG/ML
2 VIAL (ML) INTRAVENOUS ONCE
Qty: 0 | Refills: 0 | Status: COMPLETED | OUTPATIENT
Start: 2018-12-21 | End: 2018-12-21

## 2018-12-21 RX ORDER — ETOPOSIDE 20 MG/ML
220 VIAL (ML) INTRAVENOUS DAILY
Qty: 0 | Refills: 0 | Status: COMPLETED | OUTPATIENT
Start: 2018-12-22 | End: 2018-12-23

## 2018-12-21 RX ORDER — PALONOSETRON HYDROCHLORIDE 0.25 MG/5ML
0.25 INJECTION, SOLUTION INTRAVENOUS ONCE
Qty: 0 | Refills: 0 | Status: COMPLETED | OUTPATIENT
Start: 2018-12-21 | End: 2018-12-21

## 2018-12-21 RX ADMIN — FOSAPREPITANT DIMEGLUMINE 450 MILLIGRAM(S): 150 INJECTION, POWDER, LYOPHILIZED, FOR SOLUTION INTRAVENOUS at 13:48

## 2018-12-21 RX ADMIN — Medication 81 MILLIGRAM(S): at 11:50

## 2018-12-21 RX ADMIN — HEPARIN SODIUM 5000 UNIT(S): 5000 INJECTION INTRAVENOUS; SUBCUTANEOUS at 14:25

## 2018-12-21 RX ADMIN — Medication 125 MILLIGRAM(S): at 15:36

## 2018-12-21 RX ADMIN — Medication 100 MILLIGRAM(S): at 13:45

## 2018-12-21 RX ADMIN — OXYCODONE HYDROCHLORIDE 10 MILLIGRAM(S): 5 TABLET ORAL at 03:02

## 2018-12-21 RX ADMIN — PALONOSETRON HYDROCHLORIDE 0.25 MILLIGRAM(S): 0.25 INJECTION, SOLUTION INTRAVENOUS at 13:48

## 2018-12-21 RX ADMIN — HEPARIN SODIUM 5000 UNIT(S): 5000 INJECTION INTRAVENOUS; SUBCUTANEOUS at 05:11

## 2018-12-21 RX ADMIN — Medication 500 MILLIGRAM(S): at 17:59

## 2018-12-21 RX ADMIN — Medication 2: at 18:24

## 2018-12-21 RX ADMIN — Medication 300 MILLIGRAM(S): at 11:50

## 2018-12-21 RX ADMIN — Medication 2: at 00:39

## 2018-12-21 RX ADMIN — Medication 100 MILLIGRAM(S): at 05:11

## 2018-12-21 RX ADMIN — Medication 100 MILLIGRAM(S): at 18:25

## 2018-12-21 RX ADMIN — OXYCODONE HYDROCHLORIDE 10 MILLIGRAM(S): 5 TABLET ORAL at 02:32

## 2018-12-21 RX ADMIN — Medication 2: at 07:34

## 2018-12-21 RX ADMIN — Medication 900 MILLIGRAM(S): at 19:17

## 2018-12-21 NOTE — PROGRESS NOTE ADULT - PROBLEM SELECTOR PLAN 2
- L tonsillar mass, management as per ENT.   - appreciate heme/onc input  -speech for passy-ambar valve  - need clearance by RN and ENT for discharge based on ability to manage trach

## 2018-12-21 NOTE — PROGRESS NOTE ADULT - PROBLEM SELECTOR PLAN 1
-apprecaite heme recs, etoposide/cyclophosphamide until 12/23.   -daily tumor lysis labs.  -per heme, no contra-indication for discharge after treatment on 12/23

## 2018-12-21 NOTE — PROGRESS NOTE ADULT - SUBJECTIVE AND OBJECTIVE BOX
Dr. Rodríguez pager [phone]    Patient is a 68y old  Male who presents with a chief complaint of respiratory failure (20 Dec 2018 14:48)      SUBJECTIVE / OVERNIGHT EVENTS: No acute events overnight. Patient is upset this morning, agitated is in the hospital.  Spent 25 minutes reviewing [facility] hospital course, reviewing current labs and plan re: chemotherapy.  Patient less agitated by time interview ended.  No physical complaints.     MEDICATIONS  (STANDING):  allopurinol 300 milliGRAM(s) Oral daily  aspirin  chewable 81 milliGRAM(s) Oral daily  dextrose 5%. 1000 milliLiter(s) (50 mL/Hr) IV Continuous <Continuous>  dextrose 50% Injectable 12.5 Gram(s) IV Push once  dextrose 50% Injectable 25 Gram(s) IV Push once  dextrose 50% Injectable 25 Gram(s) IV Push once  docusate sodium Liquid 100 milliGRAM(s) Oral two times a day  heparin  Injectable 5000 Unit(s) SubCutaneous every 8 hours  insulin lispro (HumaLOG) corrective regimen sliding scale   SubCutaneous every 6 hours  senna Syrup 10 milliLiter(s) Oral at bedtime  sodium chloride 0.9%. 1000 milliLiter(s) (75 mL/Hr) IV Continuous <Continuous>    MEDICATIONS  (PRN):  acetaminophen    Suspension .. 650 milliGRAM(s) Oral every 6 hours PRN Mild Pain (1 - 3)  dextrose 40% Gel 15 Gram(s) Oral once PRN Blood Glucose LESS THAN 70 milliGRAM(s)/deciliter  diphenhydrAMINE   Injectable 25 milliGRAM(s) IV Push once PRN REACTION  glucagon  Injectable 1 milliGRAM(s) IntraMuscular once PRN Glucose LESS THAN 70 milligrams/deciliter  hydrocortisone sodium succinate Injectable 100 milliGRAM(s) IV Push once PRN REACTION  meperidine     Injectable 25 milliGRAM(s) IV Push once PRN RIGORS  oxyCODONE    Solution 10 milliGRAM(s) Oral every 6 hours PRN Severe Pain (7 - 10)  oxyCODONE    Solution 5 milliGRAM(s) Oral every 6 hours PRN Moderate Pain (4 - 6)    Vital Signs Last 24 Hrs  T(C): 36.6 (21 Dec 2018 10:00), Max: 36.8 (20 Dec 2018 17:29)  T(F): 97.9 (21 Dec 2018 10:00), Max: 98.2 (20 Dec 2018 17:29)  HR: 95 (21 Dec 2018 10:00) (76 - 102)  BP: 109/78 (21 Dec 2018 10:00) (109/78 - 138/86)  BP(mean): --  RR: 18 (21 Dec 2018 10:00) (17 - 18)  SpO2: 98% (21 Dec 2018 10:00) (97% - 100%)    CAPILLARY BLOOD GLUCOSE      POCT Blood Glucose.: 145 mg/dL (21 Dec 2018 12:35)    I&O's Summary    20 Dec 2018 07:01  -  21 Dec 2018 07:00  --------------------------------------------------------  IN: 3550 mL / OUT: 550 mL / NET: 3000 mL    21 Dec 2018 07:01  -  21 Dec 2018 15:20  --------------------------------------------------------  IN: 0 mL / OUT: 200 mL / NET: -200 mL          PHYSICAL EXAM:  GENERAL: NAD  HEENT: protuberant tongue. Trach collar in place  CHEST/LUNG: Clear to auscultation bilaterally; No wheeze  HEART: Regular rate and rhythm; No murmurs, rubs, or gallops  ABDOMEN: Soft, Nontender, Nondistended; Bowel sounds present. +PEG tube c/d/i.  EXTREMITIES:  No clubbing, cyanosis, or edema        LABS:                                   11.6   7.21  )-----------( 361      ( 21 Dec 2018 06:05 )             34.6   12-21    137  |  99  |  21  ----------------------------<  99  4.1   |  24  |  1.30    Ca    9.6      21 Dec 2018 06:05  Phos  3.1     12-21  Mg     2.1     12-21    TPro  7.6  /  Alb  3.3  /  TBili  0.2  /  DBili  x   /  AST  17  /  ALT  11  /  AlkPhos  62  12-21                RADIOLOGY & ADDITIONAL TESTS:    Imaging Personally Reviewed:    Consultant(s) Notes Reviewed:    Nephrology (Dr. Pagan)  Care Discussed with Consultants/Other Providers:  Hematology fellow (Dr. Goldberg)

## 2018-12-21 NOTE — CHART NOTE - NSCHARTNOTEFT_GEN_A_CORE
Notified by RN pt had episode of melanotic stool x 1. Prior to examination, pt already flushed stool. Instructed to make RN aware of repeat episode. STAT CBC ordered as well as fecal occult. Will monitor.    Emani Agustin PA-C  ADS Notified by RN pt had episode of melanotic stool x 1. Pt states he had a prior smaller episode today but does not recall when.  Prior to examination, pt already flushed stool. Instructed to make RN aware of repeat episode. STAT CBC ordered as well as fecal occult. Will monitor.    Emani Agustin PA-C  ADS

## 2018-12-21 NOTE — PROGRESS NOTE ADULT - SUBJECTIVE AND OBJECTIVE BOX
[facility] Division of Kidney Diseases & Hypertension  FOLLOW UP NOTE  [phone]--------------------------------------------------------------------------------    HPI: 68 year old male with h/o DM, HTN presented to [facility] on 12/4/18 with complaints of SOB. Patient was found to have large left tonsillar mass invading base of tongue and has tracheostomy done on 12/5/18. 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 Nephrology was consulted for JAVIER. On review of labs prior to admisson, Scr. has been WNL. Scr. on admission was also noted to be WNL(1.22) on 12/4/18. However since 12/13/18, Scr. was noted to be trending up and is now elevated at 1.44 on 12/20/18. On review of inpatient chart, patient was noted to have relatively low BP readings since 12/13. Patient has been on losartan and HCTZ for BP control. Patient also had CT chest and abdomen with IV contrast as well on 12/18/18.     Patient seen and examined. He appears to be more awake and alert today. Patient has complains of mild headache. But he denies c/o SOB, CP, abdominal pain, nausea, or vomiting.       PAST HISTORY  --------------------------------------------------------------------------------  No significant changes to PMH, PSH, FHx, SHx, unless otherwise noted    ALLERGIES & MEDICATIONS  --------------------------------------------------------------------------------  Allergies    No Known Allergies    Intolerances      Standing Inpatient Medications  allopurinol 300 milliGRAM(s) Oral daily  aspirin  chewable 81 milliGRAM(s) Oral daily  cyclophosphamide IVPB (eMAR) 1650 milliGRAM(s) IV Intermittent once  dextrose 5%. 1000 milliLiter(s) IV Continuous <Continuous>  dextrose 50% Injectable 12.5 Gram(s) IV Push once  dextrose 50% Injectable 25 Gram(s) IV Push once  dextrose 50% Injectable 25 Gram(s) IV Push once  docusate sodium Liquid 100 milliGRAM(s) Oral two times a day  etoposide IVPB (eMAR) 110 milliGRAM(s) IV Intermittent once  fosaprepitant IVPB 150 milliGRAM(s) IV Intermittent once  heparin  Injectable 5000 Unit(s) SubCutaneous every 8 hours  insulin lispro (HumaLOG) corrective regimen sliding scale   SubCutaneous every 6 hours  palonosetron Injectable 0.25 milliGRAM(s) IV Push once  predniSONE   Tablet 100 milliGRAM(s) Oral daily  senna Syrup 10 milliLiter(s) Oral at bedtime  sodium chloride 0.9%. 1000 milliLiter(s) IV Continuous <Continuous>  vinCRIStine IVPB (eMAR) 2 milliGRAM(s) IV Intermittent once    PRN Inpatient Medications  acetaminophen    Suspension .. 650 milliGRAM(s) Oral every 6 hours PRN  dextrose 40% Gel 15 Gram(s) Oral once PRN  diphenhydrAMINE   Injectable 25 milliGRAM(s) IV Push once PRN  glucagon  Injectable 1 milliGRAM(s) IntraMuscular once PRN  hydrocortisone sodium succinate Injectable 100 milliGRAM(s) IV Push once PRN  meperidine     Injectable 25 milliGRAM(s) IV Push once PRN  oxyCODONE    Solution 10 milliGRAM(s) Oral every 6 hours PRN  oxyCODONE    Solution 5 milliGRAM(s) Oral every 6 hours PRN      REVIEW OF SYSTEMS  --------------------------------------------------------------------------------  Gen: No  fevers/chills  Head/Eyes/Ears/Mouth: Headache +;  Respiratory: No dyspnea  CV: No chest pain  GI: No abdominal pain, diarrhea,  nausea, vomiting  MSK:  no edema      All other systems were reviewed and are negative, except as noted.    VITALS/PHYSICAL EXAM  --------------------------------------------------------------------------------  T(C): 36.6 (12-21-18 @ 10:00), Max: 36.8 (12-20-18 @ 17:29)  HR: 95 (12-21-18 @ 10:00) (76 - 102)  BP: 109/78 (12-21-18 @ 10:00) (109/78 - 138/86)  RR: 18 (12-21-18 @ 10:00) (17 - 20)  SpO2: 98% (12-21-18 @ 10:00) (97% - 100%)  Wt(kg): --        12-20-18 @ 07:01  -  12-21-18 @ 07:00  --------------------------------------------------------  IN: 3550 mL / OUT: 550 mL / NET: 3000 mL    12-21-18 @ 07:01  -  12-21-18 @ 12:28  --------------------------------------------------------  IN: 0 mL / OUT: 200 mL / NET: -200 mL      Physical Exam:  	  Gen: NAD  	HEENT: sclera anicteric; tracheostomy tube present.   	Pulm: CTA B/L  	CV:  S1S2  	Abd: +BS, soft   	Ext: trace edema  	Neuro: awake and alert.   	Skin: Warm and dry       LABS/STUDIES  --------------------------------------------------------------------------------              11.6   7.21  >-----------<  361      [12-21-18 @ 06:05]              34.6     137  |  99  |  21  ----------------------------<  99      [12-21-18 @ 06:05]  4.1   |  24  |  1.30        Ca     9.6     [12-21-18 @ 06:05]      Mg     2.1     [12-21-18 @ 06:05]      Phos  3.1     [12-21-18 @ 06:05]    TPro  7.6  /  Alb  3.3  /  TBili  0.2  /  DBili  x   /  AST  17  /  ALT  11  /  AlkPhos  62  [12-21-18 @ 06:05]        Uric acid 5.8      [12-20-18 @ 10:00]        [12-20-18 @ 10:00]    Creatinine Trend:  SCr 1.30 [12-21 @ 06:05]  SCr 1.44 [12-20 @ 10:00]  SCr 1.26 [12-19 @ 06:45]  SCr 1.21 [12-18 @ 06:30]  SCr 1.22 [12-17 @ 05:55]    Urinalysis - [12-20-18 @ 20:24]      Color YELLOW / Appearance CLEAR / SG 1.021 / pH 6.0      Gluc NEGATIVE / Ketone NEGATIVE  / Bili NEGATIVE / Urobili TRACE       Blood NEGATIVE / Protein 10 / Leuk Est NEGATIVE / Nitrite NEGATIVE      RBC  / WBC  / Hyaline  / Gran  / Sq Epi  / Non Sq Epi  / Bacteria     Urine Creatinine 143.50      [12-20-18 @ 20:24]  Urine Protein 13.4      [12-20-18 @ 20:24]  Urine Sodium 45      [12-20-18 @ 20:24]  Urine Potassium 50.1      [12-20-18 @ 20:24]  Urine Chloride 27      [12-20-18 @ 20:24]      HBsAb Nonreactive      [12-15-18 @ 06:05]  HBsAg NEGATIVE      [12-15-18 @ 06:05]  HBcAb Nonreactive      [12-15-18 @ 06:05]  HCV 11.88, Reactive      [12-15-18 @ 06:05]

## 2018-12-21 NOTE — PROGRESS NOTE ADULT - SUBJECTIVE AND OBJECTIVE BOX
INTERVAL HPI/OVERNIGHT EVENTS:  Patient S&E at bedside. No o/n events, patient with reported reaction to rituximab last night but finished OK and is feeling at his baseline now.     VITAL SIGNS:  T(F): 97.9 (18 @ 10:00)  HR: 95 (18 @ 10:00)  BP: 109/78 (18 @ 10:00)  RR: 18 (18 @ 10:00)  SpO2: 98% (18 @ 10:00)  Wt(kg): --    PHYSICAL EXAM:    General: NAD  HEENT: large tongue protruding out, has a trach  CHEST/LUNG: Clear to auscultation bilaterally; No wheeze  HEART: RRR, S1/S2  ABDOMEN: +BS, soft, NT/ND  EXTREMITIES: no LE edema  NEUROLOGY: non-focal    MEDICATIONS  (STANDING):  allopurinol 300 milliGRAM(s) Oral daily  aspirin  chewable 81 milliGRAM(s) Oral daily  cyclophosphamide IVPB (eMAR) 1650 milliGRAM(s) IV Intermittent once  dextrose 5%. 1000 milliLiter(s) (50 mL/Hr) IV Continuous <Continuous>  dextrose 50% Injectable 12.5 Gram(s) IV Push once  dextrose 50% Injectable 25 Gram(s) IV Push once  dextrose 50% Injectable 25 Gram(s) IV Push once  docusate sodium Liquid 100 milliGRAM(s) Oral two times a day  etoposide IVPB (eMAR) 110 milliGRAM(s) IV Intermittent once  fosaprepitant IVPB 150 milliGRAM(s) IV Intermittent once  heparin  Injectable 5000 Unit(s) SubCutaneous every 8 hours  insulin lispro (HumaLOG) corrective regimen sliding scale   SubCutaneous every 6 hours  palonosetron Injectable 0.25 milliGRAM(s) IV Push once  predniSONE   Tablet 100 milliGRAM(s) Oral daily  senna Syrup 10 milliLiter(s) Oral at bedtime  sodium chloride 0.9%. 1000 milliLiter(s) (75 mL/Hr) IV Continuous <Continuous>  vinCRIStine IVPB (eMAR) 2 milliGRAM(s) IV Intermittent once    MEDICATIONS  (PRN):  acetaminophen    Suspension .. 650 milliGRAM(s) Oral every 6 hours PRN Mild Pain (1 - 3)  dextrose 40% Gel 15 Gram(s) Oral once PRN Blood Glucose LESS THAN 70 milliGRAM(s)/deciliter  diphenhydrAMINE   Injectable 25 milliGRAM(s) IV Push once PRN REACTION  glucagon  Injectable 1 milliGRAM(s) IntraMuscular once PRN Glucose LESS THAN 70 milligrams/deciliter  hydrocortisone sodium succinate Injectable 100 milliGRAM(s) IV Push once PRN REACTION  meperidine     Injectable 25 milliGRAM(s) IV Push once PRN RIGORS  oxyCODONE    Solution 10 milliGRAM(s) Oral every 6 hours PRN Severe Pain (7 - 10)  oxyCODONE    Solution 5 milliGRAM(s) Oral every 6 hours PRN Moderate Pain (4 - 6)      Allergies    No Known Allergies    Intolerances        LABS:                        11.6   7.21  )-----------( 361      ( 21 Dec 2018 06:05 )             34.6         137  |  99  |  21  ----------------------------<  99  4.1   |  24  |  1.30    Ca    9.6      21 Dec 2018 06:05  Phos  3.1       Mg     2.1         TPro  7.6  /  Alb  3.3  /  TBili  0.2  /  DBili  x   /  AST  17  /  ALT  11  /  AlkPhos  62          Urinalysis Basic - ( 20 Dec 2018 20:24 )    Color: YELLOW / Appearance: CLEAR / S.021 / pH: 6.0  Gluc: NEGATIVE / Ketone: NEGATIVE  / Bili: NEGATIVE / Urobili: TRACE   Blood: NEGATIVE / Protein: 10 / Nitrite: NEGATIVE   Leuk Esterase: NEGATIVE / RBC: x / WBC x   Sq Epi: x / Non Sq Epi: x / Bacteria: x        RADIOLOGY & ADDITIONAL TESTS:  Studies reviewed.    < from: US Kidney and Bladder (18 @ 10:04) >  IMPRESSION:     *  Left lower pole renal cyst measuring up to 1.5 cm.  *  Otherwise normal renal ultrasound.      < end of copied text >

## 2018-12-21 NOTE — PROGRESS NOTE ADULT - ASSESSMENT
67 yo male PMHx HTN, diet controlled DM type 2, asthma, recent tonsillar mass Bx in clinic, admitted for airway protection s/p trach and peg- was on ENT service- biopsy shows DLBCL- transferred to medicine for chemo s/p Rituxumab yesterday and continuing chemotherapy until 12/23

## 2018-12-21 NOTE — PROGRESS NOTE ADULT - PROBLEM SELECTOR PLAN 1
Patient with hemodynamically mediated JAVIER in setting of IV contrast, ARB use and low BP which has resolved. Latest Scr. is WNL( 1.3) today. Recommend to hold ARBs for now. Monitor BMP daily and trend TLS labs while on chemotherapy. Will sign off for now; please recall if needed. Patient with hemodynamically mediated JAVIER in setting of IV contrast, ARB use and low BP which has resolved. Latest Scr. is WNL( 1.3) today. Recommend to hold ARBs for now. Monitor BMP daily and trend TLS labs while on chemotherapy. Will sign off for now; please recall if creatinine does not improve further or rises.

## 2018-12-21 NOTE — PROGRESS NOTE ADULT - ASSESSMENT
68M w/ h/o asthma, htn, DM (diet controlled) and recently diagnosed left sided tonsillar mass s/p non-diagnostic tonsil biopsy in the office 11/13/18 who presented to the ED with SOB s/p tracheostomy and pathology now confirming DLBCL GCB subtype    1. DLBCL:   - core LN biopsy consistent with DLBCL GCB subtype, FISH negative for BCL6, MYC, MYC/IGH and IGH/BLC2 translocation  - also had an excisional LN bx on 12/15  - CT C/A/P with indeterminate adrenal nodules only   - hep panel negative, TTE with EF of 35%  - s/p BM biopsy on 12/19/18  - Treatment plan is for R-CEOP regimen  - Day 1 (12/20): Rituxan and prednisone 100 mg daily x 5 days  - Day 2 (12/21): cytoxan and vincristine  - Day 2 - Day 4: etoposide (ending on 12/23)  - start neupogen 24 hours after completion of chemotherapy  - started on allopurinol  - please check tumor lysis labs (CMP with Phos, LDH, uric acid) daily    2. JAVIER: Cr downtrending today  - would continue IVF at 75 cc/hr for now given EF of 35% and closely monitor respiratory status.  - monitor tumor lysis labs (d/w primary team, will add on uric acid and LDH), already on allopurinol  - if any concern for tumor lysis will give rasburicase, but for now downtrending so will hold off.     d/w primary team    Bradley Goldberg M.D. - PGY-6  Hematology/Oncology Fellow  Pager: [phone] ([facility])   [phone] ([facility])  Weekends and after 5PM please contact on call fellow.

## 2018-12-22 DIAGNOSIS — D50.0 IRON DEFICIENCY ANEMIA SECONDARY TO BLOOD LOSS (CHRONIC): ICD-10-CM

## 2018-12-22 LAB
ALBUMIN SERPL ELPH-MCNC: 3 G/DL — LOW (ref 3.3–5)
ALP SERPL-CCNC: 52 U/L — SIGNIFICANT CHANGE UP (ref 40–120)
ALT FLD-CCNC: 11 U/L — SIGNIFICANT CHANGE UP (ref 4–41)
AST SERPL-CCNC: 17 U/L — SIGNIFICANT CHANGE UP (ref 4–40)
BASOPHILS # BLD AUTO: 0.02 K/UL — SIGNIFICANT CHANGE UP (ref 0–0.2)
BASOPHILS NFR BLD AUTO: 0.3 % — SIGNIFICANT CHANGE UP (ref 0–2)
BILIRUB SERPL-MCNC: < 0.2 MG/DL — LOW (ref 0.2–1.2)
BUN SERPL-MCNC: 60 MG/DL — HIGH (ref 7–23)
CALCIUM SERPL-MCNC: 9 MG/DL — SIGNIFICANT CHANGE UP (ref 8.4–10.5)
CHLORIDE SERPL-SCNC: 105 MMOL/L — SIGNIFICANT CHANGE UP (ref 98–107)
CO2 SERPL-SCNC: 24 MMOL/L — SIGNIFICANT CHANGE UP (ref 22–31)
CREAT SERPL-MCNC: 1.4 MG/DL — HIGH (ref 0.5–1.3)
EOSINOPHIL # BLD AUTO: 0 K/UL — SIGNIFICANT CHANGE UP (ref 0–0.5)
EOSINOPHIL NFR BLD AUTO: 0 % — SIGNIFICANT CHANGE UP (ref 0–6)
GLUCOSE BLDC GLUCOMTR-MCNC: 120 MG/DL — HIGH (ref 70–99)
GLUCOSE BLDC GLUCOMTR-MCNC: 121 MG/DL — HIGH (ref 70–99)
GLUCOSE BLDC GLUCOMTR-MCNC: 140 MG/DL — HIGH (ref 70–99)
GLUCOSE BLDC GLUCOMTR-MCNC: 192 MG/DL — HIGH (ref 70–99)
GLUCOSE SERPL-MCNC: 117 MG/DL — HIGH (ref 70–99)
HCT VFR BLD CALC: 21.8 % — LOW (ref 39–50)
HCT VFR BLD CALC: 24 % — LOW (ref 39–50)
HGB BLD-MCNC: 7.5 G/DL — LOW (ref 13–17)
HGB BLD-MCNC: 8.1 G/DL — LOW (ref 13–17)
IMM GRANULOCYTES # BLD AUTO: 0.05 # — SIGNIFICANT CHANGE UP
IMM GRANULOCYTES NFR BLD AUTO: 0.6 % — SIGNIFICANT CHANGE UP (ref 0–1.5)
LDH SERPL L TO P-CCNC: 325 U/L — HIGH (ref 135–225)
LYMPHOCYTES # BLD AUTO: 1.23 K/UL — SIGNIFICANT CHANGE UP (ref 1–3.3)
LYMPHOCYTES # BLD AUTO: 15.8 % — SIGNIFICANT CHANGE UP (ref 13–44)
MAGNESIUM SERPL-MCNC: 2.1 MG/DL — SIGNIFICANT CHANGE UP (ref 1.6–2.6)
MCHC RBC-ENTMCNC: 33 PG — SIGNIFICANT CHANGE UP (ref 27–34)
MCHC RBC-ENTMCNC: 33.2 PG — SIGNIFICANT CHANGE UP (ref 27–34)
MCHC RBC-ENTMCNC: 33.8 % — SIGNIFICANT CHANGE UP (ref 32–36)
MCHC RBC-ENTMCNC: 34.4 % — SIGNIFICANT CHANGE UP (ref 32–36)
MCV RBC AUTO: 96 FL — SIGNIFICANT CHANGE UP (ref 80–100)
MCV RBC AUTO: 98.4 FL — SIGNIFICANT CHANGE UP (ref 80–100)
MONOCYTES # BLD AUTO: 0.76 K/UL — SIGNIFICANT CHANGE UP (ref 0–0.9)
MONOCYTES NFR BLD AUTO: 9.7 % — SIGNIFICANT CHANGE UP (ref 2–14)
NEUTROPHILS # BLD AUTO: 5.74 K/UL — SIGNIFICANT CHANGE UP (ref 1.8–7.4)
NEUTROPHILS NFR BLD AUTO: 73.6 % — SIGNIFICANT CHANGE UP (ref 43–77)
NRBC # FLD: 0 — SIGNIFICANT CHANGE UP
NRBC # FLD: 0 — SIGNIFICANT CHANGE UP
OB PNL STL: POSITIVE — SIGNIFICANT CHANGE UP
PHOSPHATE SERPL-MCNC: 2.7 MG/DL — SIGNIFICANT CHANGE UP (ref 2.5–4.5)
PLATELET # BLD AUTO: 252 K/UL — SIGNIFICANT CHANGE UP (ref 150–400)
PLATELET # BLD AUTO: 307 K/UL — SIGNIFICANT CHANGE UP (ref 150–400)
PMV BLD: 10.3 FL — SIGNIFICANT CHANGE UP (ref 7–13)
PMV BLD: 11 FL — SIGNIFICANT CHANGE UP (ref 7–13)
POTASSIUM SERPL-MCNC: 4.5 MMOL/L — SIGNIFICANT CHANGE UP (ref 3.5–5.3)
POTASSIUM SERPL-SCNC: 4.5 MMOL/L — SIGNIFICANT CHANGE UP (ref 3.5–5.3)
PROT SERPL-MCNC: 7 G/DL — SIGNIFICANT CHANGE UP (ref 6–8.3)
RBC # BLD: 2.27 M/UL — LOW (ref 4.2–5.8)
RBC # BLD: 2.44 M/UL — LOW (ref 4.2–5.8)
RBC # FLD: 12.1 % — SIGNIFICANT CHANGE UP (ref 10.3–14.5)
RBC # FLD: 12.2 % — SIGNIFICANT CHANGE UP (ref 10.3–14.5)
SODIUM SERPL-SCNC: 141 MMOL/L — SIGNIFICANT CHANGE UP (ref 135–145)
URATE SERPL-MCNC: 4.8 MG/DL — SIGNIFICANT CHANGE UP (ref 3.4–8.8)
WBC # BLD: 10.71 K/UL — HIGH (ref 3.8–10.5)
WBC # BLD: 7.8 K/UL — SIGNIFICANT CHANGE UP (ref 3.8–10.5)
WBC # FLD AUTO: 10.71 K/UL — HIGH (ref 3.8–10.5)
WBC # FLD AUTO: 7.8 K/UL — SIGNIFICANT CHANGE UP (ref 3.8–10.5)

## 2018-12-22 PROCEDURE — 99233 SBSQ HOSP IP/OBS HIGH 50: CPT

## 2018-12-22 PROCEDURE — 99222 1ST HOSP IP/OBS MODERATE 55: CPT | Mod: GC

## 2018-12-22 RX ORDER — PANTOPRAZOLE SODIUM 20 MG/1
8 TABLET, DELAYED RELEASE ORAL
Qty: 80 | Refills: 0 | Status: DISCONTINUED | OUTPATIENT
Start: 2018-12-22 | End: 2018-12-24

## 2018-12-22 RX ORDER — PANTOPRAZOLE SODIUM 20 MG/1
80 TABLET, DELAYED RELEASE ORAL ONCE
Qty: 0 | Refills: 0 | Status: COMPLETED | OUTPATIENT
Start: 2018-12-22 | End: 2018-12-22

## 2018-12-22 RX ADMIN — Medication 600 MILLIGRAM(S): at 16:52

## 2018-12-22 RX ADMIN — Medication 100 MILLIGRAM(S): at 15:39

## 2018-12-22 RX ADMIN — PANTOPRAZOLE SODIUM 10 MG/HR: 20 TABLET, DELAYED RELEASE ORAL at 02:36

## 2018-12-22 RX ADMIN — PANTOPRAZOLE SODIUM 80 MILLIGRAM(S): 20 TABLET, DELAYED RELEASE ORAL at 02:10

## 2018-12-22 NOTE — PROVIDER CONTACT NOTE (OTHER) - BACKGROUND
Pt s/p trach
Pt s/p Trach
Pt s/p tracheostomy. Pt  had black stool
S/P tracheostomy, PEG
SBAR continued: refusing to wear humidified collar, continuous pulse ox  s/p tracheostomy, PEG
Status post trach
neck dissection and trach
neck dissection and trach
pt s/p trach
s/p PEG 12/7
s/p trach, s/p PEG

## 2018-12-22 NOTE — CONSULT NOTE ADULT - SUBJECTIVE AND OBJECTIVE BOX
Chief Complaint:  Patient is a 68y old  Male who presents with a chief complaint of respiratory failure (22 Dec 2018 10:18)      HPI:    Allergies:  No Known Allergies      Home Medications:    Hospital Medications:  acetaminophen    Suspension .. 650 milliGRAM(s) Oral every 6 hours PRN  allopurinol 300 milliGRAM(s) Oral daily  dextrose 40% Gel 15 Gram(s) Oral once PRN  dextrose 5%. 1000 milliLiter(s) IV Continuous <Continuous>  dextrose 50% Injectable 12.5 Gram(s) IV Push once  dextrose 50% Injectable 25 Gram(s) IV Push once  dextrose 50% Injectable 25 Gram(s) IV Push once  diphenhydrAMINE   Injectable 25 milliGRAM(s) IV Push once PRN  docusate sodium Liquid 100 milliGRAM(s) Oral two times a day  etoposide IVPB (eMAR) 220 milliGRAM(s) IV Intermittent daily  glucagon  Injectable 1 milliGRAM(s) IntraMuscular once PRN  hydrocortisone sodium succinate Injectable 100 milliGRAM(s) IV Push once PRN  insulin lispro (HumaLOG) corrective regimen sliding scale   SubCutaneous every 6 hours  meperidine     Injectable 25 milliGRAM(s) IV Push once PRN  oxyCODONE    Solution 10 milliGRAM(s) Oral every 6 hours PRN  oxyCODONE    Solution 5 milliGRAM(s) Oral every 6 hours PRN  pantoprazole Infusion 8 mG/Hr IV Continuous <Continuous>  predniSONE   Tablet 100 milliGRAM(s) Oral daily  senna Syrup 10 milliLiter(s) Oral at bedtime  sodium chloride 0.9%. 1000 milliLiter(s) IV Continuous <Continuous>      PMHX/PSHX:  Sleep apnea  Asthma  Localized swelling, mass and lump, head  Tonsil cancer  DM (diabetes mellitus)  HTN (hypertension)  History of surgery  Forearm fracture      Family history:  No pertinent family history in first degree relatives      There is no family history of peptic ulcer disease, gastric cancer, colon polyps, colon cancer, celiac disease, biliary, hepatic, or pancreatic disease.  None of the female relatives have breast, uterine, or ovarian cancer.     Social History:     ROS:     General:  No wt loss, fevers, chills, night sweats, fatigue,   Eyes:  Good vision, no reported pain  ENT:  No sore throat, pain, runny nose, dysphagia  CV:  No pain, palpitations, hypo/hypertension  Resp:  No dyspnea, cough, tachypnea, wheezing  GI:  No pain, No nausea, No vomiting, No diarrhea, No constipation, No weight loss, No fever, No pruritis, No rectal bleeding, No tarry stools, No dysphagia,  :  No pain, bleeding, incontinence, nocturia  Muscle:  No pain, weakness  Neuro:  No weakness, tingling, memory problems  Psych:  No fatigue, insomnia, mood problems, depression  Endocrine:  No polyuria, polydipsia, cold/heat intolerance  Heme:  No petechiae, ecchymosis, easy bruisability  Skin:  No rash, tattoos, scars, edema      PHYSICAL EXAM:     GENERAL:  Appears stated age, well-groomed, well-nourished, no distress  HEENT:  NC/AT,  conjunctivae clear and pink, no thyromegaly, nodules, adenopathy, no JVD, sclera -anicteric  CHEST:  Full & symmetric excursion, no increased effort, breath sounds clear  HEART:  Regular rhythm, S1, S2, no murmur/rub/S3/S4, no abdominal bruit, no edema  ABDOMEN:  Soft, non-tender, non-distended, normoactive bowel sounds,  no masses ,no hepato-splenomegaly, no signs of chronic liver disease  EXTEREMITIES:  no cyanosis,clubbing or edema  SKIN:  No rash/erythema/ecchymoses/petechiae/wounds/abscess/warm/dry  NEURO:  Alert, oriented, no asterixis, no tremor, no encephalopathy    Vital Signs:  Vital Signs Last 24 Hrs  T(C): 36.8 (22 Dec 2018 02:41), Max: 37.2 (21 Dec 2018 22:00)  T(F): 98.2 (22 Dec 2018 02:41), Max: 98.9 (21 Dec 2018 22:00)  HR: 95 (22 Dec 2018 02:41) (91 - 100)  BP: 105/68 (22 Dec 2018 02:41) (100/60 - 112/66)  BP(mean): --  RR: 18 (22 Dec 2018 02:41) (17 - 18)  SpO2: 100% (22 Dec 2018 02:41) (96% - 100%)  Daily     Daily     LABS:                        8.1    10.71 )-----------( 307      ( 22 Dec 2018 05:40 )             24.0     Mean Cell Volume: 98.4 fL (12-22-18 @ 05:40)        141  |  105  |  60<H>  ----------------------------<  117<H>  4.5   |  24  |  1.40<H>    Ca    9.0      22 Dec 2018 05:40  Phos  2.7       Mg     2.1         TPro  7.0  /  Alb  3.0<L>  /  TBili  < 0.2<L>  /  DBili  x   /  AST  17  /  ALT  11  /  AlkPhos  52      LIVER FUNCTIONS - ( 22 Dec 2018 05:40 )  Alb: 3.0 g/dL / Pro: 7.0 g/dL / ALK PHOS: 52 u/L / ALT: 11 u/L / AST: 17 u/L / GGT: x             Urinalysis Basic - ( 20 Dec 2018 20:24 )    Color: YELLOW / Appearance: CLEAR / S.021 / pH: 6.0  Gluc: NEGATIVE / Ketone: NEGATIVE  / Bili: NEGATIVE / Urobili: TRACE   Blood: NEGATIVE / Protein: 10 / Nitrite: NEGATIVE   Leuk Esterase: NEGATIVE / RBC: x / WBC x   Sq Epi: x / Non Sq Epi: x / Bacteria: x                              8.1    10.71 )-----------( 307      ( 22 Dec 2018 05:40 )             24.0                         8.3    8.49  )-----------( 274      ( 21 Dec 2018 23:29 )             24.5                         11.6   7.21  )-----------( 361      ( 21 Dec 2018 06:05 )             34.6                         12.4   7.18  )-----------( 364      ( 20 Dec 2018 10:00 )             37.6     Imaging: Chief Complaint:  Patient is a 68y old  Male who presents with a chief complaint of respiratory failure (22 Dec 2018 10:18)      HPI: 69 yo male PMHx HTN, diet controlled DM type 2, asthma, recent tonsillar mass Bx in clinic, admitted for airway protection s/p trach and peg. Pt was found to have DLBCL, initially on the ENT service, was transferred to medicine for chemotherapy. Pt received Rituximab yesterday and continuing chemotherapy till . Course in the hospital has been complicated by MRSA in the trach culture, status post completed treatment with vancomycin. Blood cultures negative.     GI was consulted for one episode of 3 episodes of melanotic stool. CBC came back as 8.7 (from from 11.6). When examined, pt states that his bowel movements are brown and refused to be examined and talk to. Pt otherwise denies nausea, vomiting, abdominal pain, dizziness, HA, dysphagia, odynophagia. Unsure if pt had previous EGD or colonoscopy. Pt was previously on Aspirin 81mg. Pt does not take motrin, advil, blood thinners at home.     Allergies:  No Known Allergies      Home Medications:    Hospital Medications:  acetaminophen    Suspension .. 650 milliGRAM(s) Oral every 6 hours PRN  allopurinol 300 milliGRAM(s) Oral daily  dextrose 40% Gel 15 Gram(s) Oral once PRN  dextrose 5%. 1000 milliLiter(s) IV Continuous <Continuous>  dextrose 50% Injectable 12.5 Gram(s) IV Push once  dextrose 50% Injectable 25 Gram(s) IV Push once  dextrose 50% Injectable 25 Gram(s) IV Push once  diphenhydrAMINE   Injectable 25 milliGRAM(s) IV Push once PRN  docusate sodium Liquid 100 milliGRAM(s) Oral two times a day  etoposide IVPB (eMAR) 220 milliGRAM(s) IV Intermittent daily  glucagon  Injectable 1 milliGRAM(s) IntraMuscular once PRN  hydrocortisone sodium succinate Injectable 100 milliGRAM(s) IV Push once PRN  insulin lispro (HumaLOG) corrective regimen sliding scale   SubCutaneous every 6 hours  meperidine     Injectable 25 milliGRAM(s) IV Push once PRN  oxyCODONE    Solution 10 milliGRAM(s) Oral every 6 hours PRN  oxyCODONE    Solution 5 milliGRAM(s) Oral every 6 hours PRN  pantoprazole Infusion 8 mG/Hr IV Continuous <Continuous>  predniSONE   Tablet 100 milliGRAM(s) Oral daily  senna Syrup 10 milliLiter(s) Oral at bedtime  sodium chloride 0.9%. 1000 milliLiter(s) IV Continuous <Continuous>      PMHX/PSHX:  Sleep apnea  Asthma  Localized swelling, mass and lump, head  Tonsil cancer  DM (diabetes mellitus)  HTN (hypertension)  History of surgery  Forearm fracture      Family history:  No pertinent family history in first degree relatives      There is no family history of peptic ulcer disease, gastric cancer, colon polyps, colon cancer, celiac disease, biliary, hepatic, or pancreatic disease.  None of the female relatives have breast, uterine, or ovarian cancer.     Social History: current 20 pack year smoker.     ROS:     General:  No wt loss, fevers, chills, night sweats, fatigue,   Eyes:  Good vision, no reported pain  ENT:  No sore throat, pain, runny nose, dysphagia  CV:  No pain, palpitations, hypo/hypertension  Resp:  No dyspnea, cough, tachypnea, wheezing  GI:  SeeHPI  :  No pain, bleeding, incontinence, nocturia  Muscle:  No pain, weakness  Neuro:  No weakness, tingling, memory problems  Psych:  No fatigue, insomnia, mood problems, depression  Endocrine:  No polyuria, polydipsia, cold/heat intolerance  Heme:  No petechiae, ecchymosis, easy bruisability  Skin:  No rash, tattoos, scars, edema      PHYSICAL EXAM:     GENERAL:  Appears stated age, well-groomed, well-nourished, no distress  HEENT:  NC/AT,  conjunctivae clear and pink, no thyromegaly, nodules, adenopathy, no JVD, sclera -anicteric  CHEST:  Full & symmetric excursion, no increased effort, breath sounds clear  HEART:  Regular rhythm, S1, S2, no murmur/rub/S3/S4, no abdominal bruit, no edema  ABDOMEN:  Soft, non-tender, non-distended, normoactive bowel sounds,  no masses ,no hepato-splenomegaly, no signs of chronic liver disease  EXTEREMITIES:  no cyanosis,clubbing or edema  SKIN:  No rash/erythema/ecchymoses/petechiae/wounds/abscess/warm/dry  NEURO:  Alert, oriented, no asterixis, no tremor, no encephalopathy    Vital Signs:  Vital Signs Last 24 Hrs  T(C): 36.8 (22 Dec 2018 02:41), Max: 37.2 (21 Dec 2018 22:00)  T(F): 98.2 (22 Dec 2018 02:41), Max: 98.9 (21 Dec 2018 22:00)  HR: 95 (22 Dec 2018 02:41) (91 - 100)  BP: 105/68 (22 Dec 2018 02:41) (100/60 - 112/66)  BP(mean): --  RR: 18 (22 Dec 2018 02:41) (17 - 18)  SpO2: 100% (22 Dec 2018 02:41) (96% - 100%)  Daily     Daily     LABS:                        8.1    10.71 )-----------( 307      ( 22 Dec 2018 05:40 )             24.0     Mean Cell Volume: 98.4 fL (- @ 05:40)        141  |  105  |  60<H>  ----------------------------<  117<H>  4.5   |  24  |  1.40<H>    Ca    9.0      22 Dec 2018 05:40  Phos  2.7       Mg     2.1         TPro  7.0  /  Alb  3.0<L>  /  TBili  < 0.2<L>  /  DBili  x   /  AST  17  /  ALT  11  /  AlkPhos  52      LIVER FUNCTIONS - ( 22 Dec 2018 05:40 )  Alb: 3.0 g/dL / Pro: 7.0 g/dL / ALK PHOS: 52 u/L / ALT: 11 u/L / AST: 17 u/L / GGT: x             Urinalysis Basic - ( 20 Dec 2018 20:24 )    Color: YELLOW / Appearance: CLEAR / S.021 / pH: 6.0  Gluc: NEGATIVE / Ketone: NEGATIVE  / Bili: NEGATIVE / Urobili: TRACE   Blood: NEGATIVE / Protein: 10 / Nitrite: NEGATIVE   Leuk Esterase: NEGATIVE / RBC: x / WBC x   Sq Epi: x / Non Sq Epi: x / Bacteria: x                              8.1    10.71 )-----------( 307      ( 22 Dec 2018 05:40 )             24.0                         8.3    8.49  )-----------( 274      ( 21 Dec 2018 23:29 )             24.5                         11.6   7.21  )-----------( 361      ( 21 Dec 2018 06:05 )             34.6                         12.4   7.18  )-----------( 364      ( 20 Dec 2018 10:00 )             37.6     Imaging: Chief Complaint:  Patient is a 68y old  Male who presents with a chief complaint of respiratory failure (22 Dec 2018 10:18)      HPI: 69 yo male PMHx HTN, diet controlled DM type 2, asthma, recent tonsillar mass Bx in clinic, admitted for airway protection s/p trach and peg. Pt was found to have DLBCL, initially on the ENT service, was transferred to medicine for chemotherapy. Pt received Rituximab yesterday and continuing chemotherapy till . Course in the hospital has been complicated by MRSA in the trach culture, status post completed treatment with vancomycin. Blood cultures negative.     GI was consulted for 3 episodes of melanotic stool. CBC came back as 8.7 (from from 11.6). When examined, pt states that his bowel movements are brown and refused to be examined and talk to. Pt otherwise denies nausea, vomiting, abdominal pain, dizziness, HA, dysphagia, odynophagia. Unsure if pt had previous EGD or colonoscopy. Pt was previously on Aspirin 81mg. Pt does not take motrin, advil, blood thinners at home.     Allergies:  No Known Allergies      Home Medications:    Hospital Medications:  acetaminophen    Suspension .. 650 milliGRAM(s) Oral every 6 hours PRN  allopurinol 300 milliGRAM(s) Oral daily  dextrose 40% Gel 15 Gram(s) Oral once PRN  dextrose 5%. 1000 milliLiter(s) IV Continuous <Continuous>  dextrose 50% Injectable 12.5 Gram(s) IV Push once  dextrose 50% Injectable 25 Gram(s) IV Push once  dextrose 50% Injectable 25 Gram(s) IV Push once  diphenhydrAMINE   Injectable 25 milliGRAM(s) IV Push once PRN  docusate sodium Liquid 100 milliGRAM(s) Oral two times a day  etoposide IVPB (eMAR) 220 milliGRAM(s) IV Intermittent daily  glucagon  Injectable 1 milliGRAM(s) IntraMuscular once PRN  hydrocortisone sodium succinate Injectable 100 milliGRAM(s) IV Push once PRN  insulin lispro (HumaLOG) corrective regimen sliding scale   SubCutaneous every 6 hours  meperidine     Injectable 25 milliGRAM(s) IV Push once PRN  oxyCODONE    Solution 10 milliGRAM(s) Oral every 6 hours PRN  oxyCODONE    Solution 5 milliGRAM(s) Oral every 6 hours PRN  pantoprazole Infusion 8 mG/Hr IV Continuous <Continuous>  predniSONE   Tablet 100 milliGRAM(s) Oral daily  senna Syrup 10 milliLiter(s) Oral at bedtime  sodium chloride 0.9%. 1000 milliLiter(s) IV Continuous <Continuous>      PMHX/PSHX:  Sleep apnea  Asthma  Localized swelling, mass and lump, head  Tonsil cancer  DM (diabetes mellitus)  HTN (hypertension)  History of surgery  Forearm fracture      Family history:  No pertinent family history in first degree relatives      There is no family history of peptic ulcer disease, gastric cancer, colon polyps, colon cancer, celiac disease, biliary, hepatic, or pancreatic disease.  None of the female relatives have breast, uterine, or ovarian cancer.     Social History: current 20 pack year smoker.     ROS:     General:  No wt loss, fevers, chills, night sweats, fatigue,   Eyes:  Good vision, no reported pain  ENT:  No sore throat, pain, runny nose, dysphagia  CV:  No pain, palpitations, hypo/hypertension  Resp:  No dyspnea, cough, tachypnea, wheezing  GI:  SeeHPI  :  No pain, bleeding, incontinence, nocturia  Muscle:  No pain, weakness  Neuro:  No weakness, tingling, memory problems  Psych:  No fatigue, insomnia, mood problems, depression  Endocrine:  No polyuria, polydipsia, cold/heat intolerance  Heme:  No petechiae, ecchymosis, easy bruisability  Skin:  No rash, tattoos, scars, edema      PHYSICAL EXAM:     GENERAL:  Appears stated age, well-groomed, well-nourished, no distress  HEENT:  NC/AT,  conjunctivae clear and pink, no thyromegaly, nodules, adenopathy, no JVD, sclera -anicteric  CHEST:  Full & symmetric excursion, no increased effort, breath sounds clear  HEART:  Regular rhythm, S1, S2, no murmur/rub/S3/S4, no abdominal bruit, no edema  ABDOMEN:  Soft, non-tender, non-distended, normoactive bowel sounds,  no masses ,no hepato-splenomegaly, no signs of chronic liver disease  EXTEREMITIES:  no cyanosis,clubbing or edema  SKIN:  No rash/erythema/ecchymoses/petechiae/wounds/abscess/warm/dry  NEURO:  Alert, oriented, no asterixis, no tremor, no encephalopathy    Vital Signs:  Vital Signs Last 24 Hrs  T(C): 36.8 (22 Dec 2018 02:41), Max: 37.2 (21 Dec 2018 22:00)  T(F): 98.2 (22 Dec 2018 02:41), Max: 98.9 (21 Dec 2018 22:00)  HR: 95 (22 Dec 2018 02:41) (91 - 100)  BP: 105/68 (22 Dec 2018 02:41) (100/60 - 112/66)  BP(mean): --  RR: 18 (22 Dec 2018 02:41) (17 - 18)  SpO2: 100% (22 Dec 2018 02:41) (96% - 100%)  Daily     Daily     LABS:                        8.1    10.71 )-----------( 307      ( 22 Dec 2018 05:40 )             24.0     Mean Cell Volume: 98.4 fL (- @ 05:40)        141  |  105  |  60<H>  ----------------------------<  117<H>  4.5   |  24  |  1.40<H>    Ca    9.0      22 Dec 2018 05:40  Phos  2.7       Mg     2.1         TPro  7.0  /  Alb  3.0<L>  /  TBili  < 0.2<L>  /  DBili  x   /  AST  17  /  ALT  11  /  AlkPhos  52      LIVER FUNCTIONS - ( 22 Dec 2018 05:40 )  Alb: 3.0 g/dL / Pro: 7.0 g/dL / ALK PHOS: 52 u/L / ALT: 11 u/L / AST: 17 u/L / GGT: x             Urinalysis Basic - ( 20 Dec 2018 20:24 )    Color: YELLOW / Appearance: CLEAR / S.021 / pH: 6.0  Gluc: NEGATIVE / Ketone: NEGATIVE  / Bili: NEGATIVE / Urobili: TRACE   Blood: NEGATIVE / Protein: 10 / Nitrite: NEGATIVE   Leuk Esterase: NEGATIVE / RBC: x / WBC x   Sq Epi: x / Non Sq Epi: x / Bacteria: x                              8.1    10.71 )-----------( 307      ( 22 Dec 2018 05:40 )             24.0                         8.3    8.49  )-----------( 274      ( 21 Dec 2018 23:29 )             24.5                         11.6   7.21  )-----------( 361      ( 21 Dec 2018 06:05 )             34.6                         12.4   7.18  )-----------( 364      ( 20 Dec 2018 10:00 )             37.6     Imaging: Chief Complaint:  Patient is a 68y old  Male who presents with a chief complaint of respiratory failure now with melena and anemia, suspected acute blood loss secondary to GI source      HPI: 67 yo male PMHx HTN, diet controlled DM type 2, asthma, recent tonsillar mass Bx in clinic, admitted for airway protection s/p trach and peg. Pt was found to have DLBCL, initially on the ENT service, was transferred to medicine for chemotherapy. Pt received Rituximab yesterday and continuing chemotherapy till . Course in the hospital has been complicated by MRSA in the trach culture, status post completed treatment with vancomycin. Blood cultures negative.     GI was consulted for 3 episodes of melanotic stool. CBC came back as 8.7 (from from 11.6). When examined, pt states that his bowel movements are brown and refused to give hisotry or be examined. Pt otherwise denies nausea, vomiting, abdominal pain, dizziness, HA, dysphagia, odynophagia. Unsure if pt had previous EGD or colonoscopy. Pt was previously on Aspirin 81mg. Pt does not take motrin, advil, blood thinners at home.     Allergies:  No Known Allergies      Home Medications:    Hospital Medications:  acetaminophen    Suspension .. 650 milliGRAM(s) Oral every 6 hours PRN  allopurinol 300 milliGRAM(s) Oral daily  dextrose 40% Gel 15 Gram(s) Oral once PRN  dextrose 5%. 1000 milliLiter(s) IV Continuous <Continuous>  dextrose 50% Injectable 12.5 Gram(s) IV Push once  dextrose 50% Injectable 25 Gram(s) IV Push once  dextrose 50% Injectable 25 Gram(s) IV Push once  diphenhydrAMINE   Injectable 25 milliGRAM(s) IV Push once PRN  docusate sodium Liquid 100 milliGRAM(s) Oral two times a day  etoposide IVPB (eMAR) 220 milliGRAM(s) IV Intermittent daily  glucagon  Injectable 1 milliGRAM(s) IntraMuscular once PRN  hydrocortisone sodium succinate Injectable 100 milliGRAM(s) IV Push once PRN  insulin lispro (HumaLOG) corrective regimen sliding scale   SubCutaneous every 6 hours  meperidine     Injectable 25 milliGRAM(s) IV Push once PRN  oxyCODONE    Solution 10 milliGRAM(s) Oral every 6 hours PRN  oxyCODONE    Solution 5 milliGRAM(s) Oral every 6 hours PRN  pantoprazole Infusion 8 mG/Hr IV Continuous <Continuous>  predniSONE   Tablet 100 milliGRAM(s) Oral daily  senna Syrup 10 milliLiter(s) Oral at bedtime  sodium chloride 0.9%. 1000 milliLiter(s) IV Continuous <Continuous>      PMHX/PSHX:  Sleep apnea  Asthma  Localized swelling, mass and lump, head  Tonsil cancer  DM (diabetes mellitus)  HTN (hypertension)  History of surgery  Forearm fracture      Family history:  No pertinent family history in first degree relatives      There is no family history of peptic ulcer disease, gastric cancer, colon polyps, colon cancer, celiac disease, biliary, hepatic, or pancreatic disease.  None of the female relatives have breast, uterine, or ovarian cancer.     Social History: current 20 pack year smoker.     ROS:     General:  No wt loss, fevers, chills, night sweats, fatigue,   Eyes:  Good vision, no reported pain  ENT:  No sore throat, pain, runny nose, dysphagia  CV:  No pain, palpitations, hypo/hypertension  Resp:  No dyspnea, cough, tachypnea, wheezing  GI:  See HPI  :  No pain, bleeding, incontinence, nocturia  Muscle:  No pain, weakness  Neuro:  No weakness, tingling, memory problems  Psych:  No fatigue, insomnia, mood problems, depression  Endocrine:  No polyuria, polydipsia, cold/heat intolerance  Heme:  No petechiae, ecchymosis, easy bruisability  Skin:  No rash, tattoos, scars, edema      PHYSICAL EXAM:     GENERAL:  Appears stated age, well-groomed, well-nourished  HEENT:  NC/AT, anicteric, +trach  CHEST:  Full & symmetric excursion, no increased effort, breath sounds clear  HEART:  Regular rhythm, S1, S2  ABDOMEN: Refused abdominal exam, PEG site dressed in bandage  EXTREMITIES:  no cyanosis, clubbing or edema  SKIN:  No rash/erythema/ecchymoses/petechiae/wounds/abscess/warm/dry  NEURO: No focal deficits  Psych: Alert and oriented but poor insight    Vital Signs:  Vital Signs Last 24 Hrs  T(C): 36.8 (22 Dec 2018 02:41), Max: 37.2 (21 Dec 2018 22:00)  T(F): 98.2 (22 Dec 2018 02:41), Max: 98.9 (21 Dec 2018 22:00)  HR: 95 (22 Dec 2018 02:41) (91 - 100)  BP: 105/68 (22 Dec 2018 02:41) (100/60 - 112/66)  BP(mean): --  RR: 18 (22 Dec 2018 02:41) (17 - 18)  SpO2: 100% (22 Dec 2018 02:41) (96% - 100%)  Daily     Daily     LABS:                        8.1    10.71 )-----------( 307      ( 22 Dec 2018 05:40 )             24.0     Mean Cell Volume: 98.4 fL (-18 @ 05:40)        141  |  105  |  60<H>  ----------------------------<  117<H>  4.5   |  24  |  1.40<H>    Ca    9.0      22 Dec 2018 05:40  Phos  2.7       Mg     2.1         TPro  7.0  /  Alb  3.0<L>  /  TBili  < 0.2<L>  /  DBili  x   /  AST  17  /  ALT  11  /  AlkPhos  52  12    LIVER FUNCTIONS - ( 22 Dec 2018 05:40 )  Alb: 3.0 g/dL / Pro: 7.0 g/dL / ALK PHOS: 52 u/L / ALT: 11 u/L / AST: 17 u/L / GGT: x             Urinalysis Basic - ( 20 Dec 2018 20:24 )    Color: YELLOW / Appearance: CLEAR / S.021 / pH: 6.0  Gluc: NEGATIVE / Ketone: NEGATIVE  / Bili: NEGATIVE / Urobili: TRACE   Blood: NEGATIVE / Protein: 10 / Nitrite: NEGATIVE   Leuk Esterase: NEGATIVE / RBC: x / WBC x   Sq Epi: x / Non Sq Epi: x / Bacteria: x                              8.1    10.71 )-----------( 307      ( 22 Dec 2018 05:40 )             24.0                         8.3    8.49  )-----------( 274      ( 21 Dec 2018 23:29 )             24.5                         11.6   7.21  )-----------( 361      ( 21 Dec 2018 06:05 )             34.6                         12.4   7.18  )-----------( 364      ( 20 Dec 2018 10:00 )             37.6     Imaging:

## 2018-12-22 NOTE — PROVIDER CONTACT NOTE (MEDICATION) - SITUATION
Pt was briefly disconnected from Protonix drip, pt refusing to be reconnected. When discussed the risks of being disconnected; pt became aggressive

## 2018-12-22 NOTE — CONSULT NOTE ADULT - REASON FOR ADMISSION
respiratory failure

## 2018-12-22 NOTE — CHART NOTE - NSCHARTNOTEFT_GEN_A_CORE
Repeat CBC revealed Hgb 8.7 down from 11.6 earlier this am.  Will discontinue heparin and ASA, hold G-tube feeds, administer protonix, monitor for repeat episodes of melena, and obtain repeat CBC at 4 am.     Emani Agustin PA-C  ADS

## 2018-12-22 NOTE — PROVIDER CONTACT NOTE (OTHER) - SITUATION
Patient agitated, confused, refusing the remaining of protonix drip
849B temp is 101.8, , bp 157/94.
Patient found to be aspirating with chocolate pudding coming through trach with increase coughing.
Patient having dark black stool, refusing  midnight feed and not connected to Pulse ox machine
Patient refusing protonix drip
Pt. PEG LLQ bleeding from site, pt. reports pain at incision site
Pt. refusing trach care, suctioning, some meds, tube feedings, free water, refused to go down for ultrasound procedure, pt. has urinated x1 for shift. Pt. pulled out PIV, trying to leave room.
Vanco trough 14.8.
bp 180/89
patient having episodes of confusion- keeps getting out of bed without calling for assistance. urinated on the floor. keeps telling nurse he doesn't know where he is
pt not voiding

## 2018-12-22 NOTE — CONSULT NOTE ADULT - ATTENDING COMMENTS
Mr Federica johnston developed a large left sided mass at level 2 and 3 that is firm and measures 6 cm X 6 cm. The mass is fixed. A review of his MRI shows a large base of tongue mass extending to the hypopharynx. Repeated biopsies have been negative for squamous cell carcinoma although clinically this may be the diagnosis. I would recommend a left neck lymph node excision to confirm a diagnosis of lymphoma as recent biopsies have shown inflammatory cells and lymphocytes
I have seen and evaluated the patient with the GI Fellow and GI Team.  I agree with the findings, formulation and plan of care as documented in the resident / fellows note and edited where appropriate.

## 2018-12-22 NOTE — PROVIDER CONTACT NOTE (OTHER) - ACTION/TREATMENT ORDERED:
Will monitor
Bladder scan, call MD with results
CBC and Occult slide ordered.Team on floor to see patient
MD aware- will put in orders. Rn will continue to monitor
MD recommended to give 4th dose. No changes made to dosage.
None at the time
Patient now NPO.
Provider aware of situation, will come to see pt., bladder scan to be ordered, pysch consult possible to be ordered, will continue to monitor pt.
Provider aware, will come look at PEG site, continue to monitor pt./bleeding
awaiting on MD
md will put in a BP med

## 2018-12-22 NOTE — PROGRESS NOTE ADULT - SUBJECTIVE AND OBJECTIVE BOX
INTERVAL HPI/OVERNIGHT EVENTS:  Pt with melanotic stools overnight with drop in hemoglobin of about 3 gm. He was found to have occult + FOBT and started on protonix gtt. GI has been consulted. This AM pt adamantly denies having bleeding episodes and says he is fine. He seems quite angry this AM and is intermittently refusing treatments. Unable to further clarify history.      MEDICATIONS  (STANDING):  allopurinol 300 milliGRAM(s) Oral daily  dextrose 5%. 1000 milliLiter(s) (50 mL/Hr) IV Continuous <Continuous>  dextrose 50% Injectable 12.5 Gram(s) IV Push once  dextrose 50% Injectable 25 Gram(s) IV Push once  dextrose 50% Injectable 25 Gram(s) IV Push once  docusate sodium Liquid 100 milliGRAM(s) Oral two times a day  etoposide IVPB (eMAR) 220 milliGRAM(s) IV Intermittent daily  insulin lispro (HumaLOG) corrective regimen sliding scale   SubCutaneous every 6 hours  pantoprazole Infusion 8 mG/Hr (10 mL/Hr) IV Continuous <Continuous>  predniSONE   Tablet 100 milliGRAM(s) Oral daily  senna Syrup 10 milliLiter(s) Oral at bedtime  sodium chloride 0.9%. 1000 milliLiter(s) (75 mL/Hr) IV Continuous <Continuous>    MEDICATIONS  (PRN):  acetaminophen    Suspension .. 650 milliGRAM(s) Oral every 6 hours PRN Mild Pain (1 - 3)  dextrose 40% Gel 15 Gram(s) Oral once PRN Blood Glucose LESS THAN 70 milliGRAM(s)/deciliter  diphenhydrAMINE   Injectable 25 milliGRAM(s) IV Push once PRN REACTION  glucagon  Injectable 1 milliGRAM(s) IntraMuscular once PRN Glucose LESS THAN 70 milligrams/deciliter  hydrocortisone sodium succinate Injectable 100 milliGRAM(s) IV Push once PRN REACTION  meperidine     Injectable 25 milliGRAM(s) IV Push once PRN RIGORS  oxyCODONE    Solution 10 milliGRAM(s) Oral every 6 hours PRN Severe Pain (7 - 10)  oxyCODONE    Solution 5 milliGRAM(s) Oral every 6 hours PRN Moderate Pain (4 - 6)    Allergies    No Known Allergies    Intolerances          VITAL SIGNS:  T(F): 98.2 (18 @ 02:41)  HR: 95 (18 @ 02:41)  BP: 105/68 (12-22-18 @ 02:41)  RR: 18 (18 @ 02:41)  SpO2: 100% (18 @ 02:41)  Wt(kg): --    PHYSICAL EXAM:    General: NAD, appears angry   HEENT: large tongue protruding out, has a trach  CHEST/LUNG: Clear to auscultation bilaterally  HEART: RRR, S1/S2  ABDOMEN: +BS, soft, NT/ND  EXTREMITIES: no LE edema      LABS:                        8.1    10.71 )-----------( 307      ( 22 Dec 2018 05:40 )             24.0     12    141  |  105  |  60<H>  ----------------------------<  117<H>  4.5   |  24  |  1.40<H>    Ca    9.0      22 Dec 2018 05:40  Phos  2.7       Mg     2.1         TPro  7.0  /  Alb  3.0<L>  /  TBili  < 0.2<L>  /  DBili  x   /  AST  17  /  ALT  11  /  AlkPhos  52  12-22      Urinalysis Basic - ( 20 Dec 2018 20:24 )    Color: YELLOW / Appearance: CLEAR / S.021 / pH: 6.0  Gluc: NEGATIVE / Ketone: NEGATIVE  / Bili: NEGATIVE / Urobili: TRACE   Blood: NEGATIVE / Protein: 10 / Nitrite: NEGATIVE   Leuk Esterase: NEGATIVE / RBC: x / WBC x   Sq Epi: x / Non Sq Epi: x / Bacteria: x        RADIOLOGY & ADDITIONAL TESTS:  Studies reviewed. INTERVAL HPI/OVERNIGHT EVENTS:   Pt with melanotic stools overnight with drop in hemoglobin of about 3 gm. He was found to have occult + FOBT and started on protonix gtt. GI has been consulted. This AM pt adamantly denies having bleeding episodes and says he is fine. He seems quite angry this AM and is intermittently refusing treatments. Unable to further clarify history.      MEDICATIONS  (STANDING):  allopurinol 300 milliGRAM(s) Oral daily  dextrose 5%. 1000 milliLiter(s) (50 mL/Hr) IV Continuous <Continuous>  dextrose 50% Injectable 12.5 Gram(s) IV Push once  dextrose 50% Injectable 25 Gram(s) IV Push once  dextrose 50% Injectable 25 Gram(s) IV Push once  docusate sodium Liquid 100 milliGRAM(s) Oral two times a day  etoposide IVPB (eMAR) 220 milliGRAM(s) IV Intermittent daily  insulin lispro (HumaLOG) corrective regimen sliding scale   SubCutaneous every 6 hours  pantoprazole Infusion 8 mG/Hr (10 mL/Hr) IV Continuous <Continuous>  predniSONE   Tablet 100 milliGRAM(s) Oral daily  senna Syrup 10 milliLiter(s) Oral at bedtime  sodium chloride 0.9%. 1000 milliLiter(s) (75 mL/Hr) IV Continuous <Continuous>    MEDICATIONS  (PRN):  acetaminophen    Suspension .. 650 milliGRAM(s) Oral every 6 hours PRN Mild Pain (1 - 3)  dextrose 40% Gel 15 Gram(s) Oral once PRN Blood Glucose LESS THAN 70 milliGRAM(s)/deciliter  diphenhydrAMINE   Injectable 25 milliGRAM(s) IV Push once PRN REACTION  glucagon  Injectable 1 milliGRAM(s) IntraMuscular once PRN Glucose LESS THAN 70 milligrams/deciliter  hydrocortisone sodium succinate Injectable 100 milliGRAM(s) IV Push once PRN REACTION  meperidine     Injectable 25 milliGRAM(s) IV Push once PRN RIGORS  oxyCODONE    Solution 10 milliGRAM(s) Oral every 6 hours PRN Severe Pain (7 - 10)  oxyCODONE    Solution 5 milliGRAM(s) Oral every 6 hours PRN Moderate Pain (4 - 6)    No Known Allergies    VITAL SIGNS:  T(F): 98.2 (18 @ 02:41)  HR: 95 (18 @ 02:41)  BP: 105/68 (18 @ 02:41)  RR: 18 (18 @ 02:41)  SpO2: 100% (18 @ 02:41)  Wt(kg): --    PHYSICAL EXAM:    General: NAD, appears angry, moderate protein calorie malnutrition   HEENT: large tongue protruding out, has a tracheosteomy in good position  CHEST/LUNG: Clear to auscultation bilaterally  HEART: RRR, S1/S2  ABDOMEN: +BS, soft, NT/ND, G tube placed for feeding but not yet usec  EXTREMITIES: no LE edema    LABS:                        8.1    10.71 )-----------( 307      ( 22 Dec 2018 05:40 )             24.0         141  |  105  |  60<H>  ----------------------------<  117<H>  4.5   |  24  |  1.40<H>    Ca    9.0      22 Dec 2018 05:40  Phos  2.7       Mg     2.1         TPro  7.0  /  Alb  3.0<L>  /  TBili  < 0.2<L>  /  DBili  x   /  AST  17  /  ALT  11  /  AlkPhos  52      Urinalysis Basic - ( 20 Dec 2018 20:24 )    Color: YELLOW / Appearance: CLEAR / S.021 / pH: 6.0  Gluc: NEGATIVE / Ketone: NEGATIVE  / Bili: NEGATIVE / Urobili: TRACE   Blood: NEGATIVE / Protein: 10 / Nitrite: NEGATIVE   Leuk Esterase: NEGATIVE / RBC: x / WBC x   Sq Epi: x / Non Sq Epi: x / Bacteria: x    RADIOLOGY & ADDITIONAL TESTS:  Studies reviewed.

## 2018-12-22 NOTE — PROGRESS NOTE ADULT - PROBLEM SELECTOR PLAN 2
-apprecaite heme recs, etoposide/cyclophosphamide until 12/23.   -daily tumor lysis labs.  -discussed with heme fellow, appreciate note, to resume chemo today.   -per heme, no contra-indication for discharge after treatment on 12/23

## 2018-12-22 NOTE — PROGRESS NOTE ADULT - ASSESSMENT
68M w/ h/o asthma, htn, DM (diet controlled) and recently diagnosed left sided tonsillar mass s/p non-diagnostic tonsil biopsy in the office 11/13/18 who presented to the ED with SOB s/p tracheostomy and pathology now confirming DLBCL GCB subtype    1. DLBCL:   - core LN biopsy consistent with DLBCL GCB subtype, FISH negative for BCL6, MYC, MYC/IGH and IGH/BLC2 translocation  - also had an excisional LN bx on 12/15  - CT C/A/P with indeterminate adrenal nodules only   - hep panel negative, TTE with EF of 35%  - s/p BM biopsy on 12/19/18  - Treatment plan is for R-CEOP regimen  - Day 1 (12/20): Rituxan and prednisone 100 mg daily x 5 days  - Day 2 (12/21): cytoxan and vincristine  - Day 2 - Day 4: planned for etoposide (ending on 12/23); however would hold for now in setting of potential GI bleed. Can resume when pt acute issues resolve   - start neupogen 24 hours after completion of chemotherapy  - continue allopurinol  - please check tumor lysis labs (CMP with Phos, LDH, uric acid) daily    2. JAVIER: Cr increased to 1.40 with significant increase in BUN  - likely in setting of GI bleed +/- component from tumor lysis   - continue IVFs keeping cardiac function in mind with serial lung exams   - monitor tumor lysis labs daily (uric acid, LDH, phos, creatinine, K)     3. Anemia- drop in hemoglobin, which suspected upper GI bleed  - pt with melanotic stools, with FOBT +   - trend H/H q8 for now and continue protnoix gtt  - GI consult pending  - would hold further chemo until pt stabilized from GI perspective       Joleen Llanos, PGY-4  Hematology-Oncology Fellow  [phone] ([place]) [phone] ([facility]) 68M w/ h/o asthma, htn, DM (diet controlled) and recently diagnosed left sided tonsillar mass s/p non-diagnostic tonsil biopsy in the office 11/13/18 who presented to the ED with SOB s/p tracheostomy and pathology now confirming DLBCL GCB subtype    1. DLBCL:   - core LN biopsy consistent with DLBCL GCB subtype, FISH negative for BCL6, MYC, MYC/IGH and IGH/BLC2 translocation  - also had an excisional LN bx on 12/15  - CT C/A/P with indeterminate adrenal nodules only   - hep panel negative, TTE with EF of 35%  - s/p BM biopsy on 12/19/18  - Treatment plan is for R-CEOP regimen  - Day 1 (12/20): Rituxan and prednisone 100 mg daily x 5 days  - Day 2 (12/21): cytoxan and vincristine  - Day 2 - Day 4: planned for etoposide (ending on 12/23); can continue therapy today for today    - start neupogen 24 hours after completion of chemotherapy  - continue allopurinol  - please check tumor lysis labs (CMP with Phos, LDH, uric acid) daily    2. JAVIER: Cr increased to 1.40 with significant increase in BUN  - likely in setting of GI bleed +/- component from tumor lysis   - continue IVFs keeping cardiac function in mind with serial lung exams   - monitor tumor lysis labs daily (uric acid, LDH, phos, creatinine, K)     3. Anemia- drop in hemoglobin, which suspected upper GI bleed  - pt with melanotic stools, with FOBT +   - trend H/H q8 for now and continue protnoix gtt  - GI consult pending  -will continue chemotherapy as pt is adamant about this       Joleen Llanos, PGY-4  Hematology-Oncology Fellow  [phone] ([place]) [phone] ([facility]) 68M w/ h/o asthma, htn, DM (diet controlled) and recently diagnosed left sided tonsillar mass s/p non-diagnostic tonsil biopsy in the office 11/13/18 who presented to the ED with SOB s/p tracheostomy and pathology confirming DLBCL, GCB subtype    1. DLBCL:     - core LN biopsy consistent with DLBCL GCB subtype, FISH negative for BCL6, MYC, MYC/IGH and IGH/BLC2 translocation  - also had an excisional LN bx on 12/15  - CT C/A/P with indeterminate adrenal nodules only   - hep panel negative, TTE with EF of 35%  - s/p BM biopsy on 12/19/18  - Treatment plan is R-CEOP regimen  - Day 1 (12/20): Rituximab and Prednisone 100 mg daily x 5 days  - Day 2 (12/21): Cyclophosphamide and Vincristine  - Day 2 - Day 4: planned for etoposide (ending on 12/23); can continue therapy today for today    - start filgrastim 24 hours after completion of chemotherapy  - continue allopurinol  - please check tumor lysis labs (CMP with Phos, LDH, uric acid) daily    2. JAVIER: Cr increased to 1.40 with significant increase in BUN  - likely in setting of GI bleed +/- component from tumor lysis vs purely dilutional event  - continue IVFs keeping cardiac function in mind with serial lung exams   - monitor tumor lysis labs daily (uric acid, LDH, phos, creatinine, K)     3. Anemia- drop in hemoglobin, which suspected upper GI bleed  - pt with melanotic stools, with FOBT +   - trend H/H q8 for now and continue protnoix gtt  - GI consult pending  - will continue chemotherapy as there is no reason to delay given aggressive nature of cancer; may mean longer term of more intensive supportive care, however.     Seen with inpt attending Dr Bernadine Llanos, PGY-4  Hematology-Oncology Fellow  [phone] ([place]) [phone] ([facility])

## 2018-12-22 NOTE — PROGRESS NOTE ADULT - ASSESSMENT
69 yo male PMHx HTN, diet controlled DM type 2, asthma, recent tonsillar mass Bx in clinic, admitted for airway protection s/p trach and peg- was on ENT service- biopsy shows DLBCL- transferred to medicine for chemo s/p Rituxumab yesterday and continuing chemotherapy until 12/23 nwo c/b by acute blood loss anemia, suspected GI source

## 2018-12-22 NOTE — PROVIDER CONTACT NOTE (OTHER) - RECOMMENDATIONS
ADS notified
Bladder scan
MD notified
MD notify
Make NPO with tube feeding going.
Provider to be made aware, PEG to be assessed by CT surgery team
Provider to be notified, monitor pt, give education regarding nursing interventions, possible psych consult
Team notified
Team notified
pt was suctioned and Trach was cleaned. repositioned, BP meds were given. awaiting on MD
pt was suctioned and Trach was cleaned. repositioned, took to the bathroom

## 2018-12-22 NOTE — PROGRESS NOTE ADULT - SUBJECTIVE AND OBJECTIVE BOX
Authored by Mason Waterman, DO: Beeper#[phone]    CHIOMA COOL  68y  Male      Patient is a 68y old  Male who presents with a chief complaint of respiratory failure (22 Dec 2018 12:31)      INTERVAL HPI/OVERNIGHT EVENTS: Patient with melanotic stool overnight, had 3g drop in H/H.  Patient seen this morning.  Sitting in bed, upset because bathroom was dirty. Despite lengthy conversation yesterday and again this morning, patient with poor insight into severity of disease of DLBCL as well as acute drop in H/H, harping on blood draws and only getting bolus feeds instead of acute issues.        T(C): 36.7 (12-22-18 @ 13:28), Max: 37.2 (12-21-18 @ 22:00)  HR: 85 (12-22-18 @ 13:28) (85 - 95)  BP: 115/64 (12-22-18 @ 13:28) (102/57 - 115/64)  RR: 18 (12-22-18 @ 13:28) (16 - 18)  SpO2: 100% (12-22-18 @ 13:28) (96% - 100%)  Wt(kg): --Vital Signs Last 24 Hrs  T(C): 36.7 (22 Dec 2018 13:28), Max: 37.2 (21 Dec 2018 22:00)  T(F): 98 (22 Dec 2018 13:28), Max: 98.9 (21 Dec 2018 22:00)  HR: 85 (22 Dec 2018 13:28) (85 - 95)  BP: 115/64 (22 Dec 2018 13:28) (102/57 - 115/64)  BP(mean): --  RR: 18 (22 Dec 2018 13:28) (16 - 18)  SpO2: 100% (22 Dec 2018 13:28) (96% - 100%)    PHYSICAL EXAM:  GENERAL: NAD, well-groomed, well-developed. Agitated  HEENT: trach in place, protuberant tongue improving.   CHEST/LUNG: Clear to percussion bilaterally; No rales, rhonchi, wheezing, or rubs  HEART: Regular rate and rhythm; No murmurs, rubs, or gallops  ABDOMEN: Soft. PEG site with black secretion in tubing at site of PEG tube on dressing.     EXTREMITIES:  2+ Peripheral Pulses, No clubbing, cyanosis, or edema  PSYCH: Alert & Oriented x3, poor insight into disease process.  Agitated  NERVOUS SYSTEM:  ; Motor Strength 5/5 B/L upper and lower extremities.  Sensory intact    Consultant(s) Notes Reviewed:  [x ] YES  [ ] NO  Care Discussed with Consultants/Other Providers [ x] YES  [ ] NO: GI fellow, ENT resident, ADS     LABS:                        8.1    10.71 )-----------( 307      ( 22 Dec 2018 05:40 )             24.0     12-22    141  |  105  |  60<H>  ----------------------------<  117<H>  4.5   |  24  |  1.40<H>    Ca    9.0      22 Dec 2018 05:40  Phos  2.7     12-22  Mg     2.1     12-22    TPro  7.0  /  Alb  3.0<L>  /  TBili  < 0.2<L>  /  DBili  x   /  AST  17  /  ALT  11  /  AlkPhos  52  12-22      RADIOLOGY & ADDITIONAL TESTS:    Imaging Personally Reviewed:  [ ] YES  [ ] NO

## 2018-12-22 NOTE — PROGRESS NOTE ADULT - SUBJECTIVE AND OBJECTIVE BOX
ENT called to evaluate trach due to drop in h/h. Patient denies any bleeding from the trach or the mouth. On exam patient's oral cavity and trach site are clear of any blood and the secretions suctioned from his trach are clear secretions.  -no evidence of bleeding from oral cavity or trach  -work-up of h/h drop including possible GI bleed per primary team

## 2018-12-22 NOTE — CONSULT NOTE ADULT - ASSESSMENT
Impression:  1) Melena- Differential diagnosis includes PUD, erosive gastropathy/esophagitis, angiectasia, malignancy (lymphoma), Dieulafoy's lesion  2) Respiratory failure sp trach and peg  3) DLBCL, tonsillar mass    Recommendations:  -Pt currently refusing endoscopic intervention  -Continue to monitor BM  -Serial CBC q8 hours  -2 large bore IVs  -PPI gtt  -Rest of care per primary team Impression:  1) Melena- Differential diagnosis includes PUD, erosive gastropathy/esophagitis, angiectasia, malignancy (lymphoma), Dieulafoy's lesion  2) Respiratory failure sp trach and PEG  3) DLBCL, tonsillar mass    Recommendations:  - Pt currently refusing endoscopic intervention  - Continue to monitor BM  - Serial CBC q8 hours  - 2 large bore IVs  - PPI gtt  - Rest of care per primary team

## 2018-12-23 LAB
ALBUMIN SERPL ELPH-MCNC: 3.3 G/DL — SIGNIFICANT CHANGE UP (ref 3.3–5)
ALP SERPL-CCNC: 51 U/L — SIGNIFICANT CHANGE UP (ref 40–120)
ALT FLD-CCNC: 10 U/L — SIGNIFICANT CHANGE UP (ref 4–41)
AST SERPL-CCNC: 17 U/L — SIGNIFICANT CHANGE UP (ref 4–40)
BILIRUB SERPL-MCNC: 0.3 MG/DL — SIGNIFICANT CHANGE UP (ref 0.2–1.2)
BLD GP AB SCN SERPL QL: NEGATIVE — SIGNIFICANT CHANGE UP
BUN SERPL-MCNC: 44 MG/DL — HIGH (ref 7–23)
CALCIUM SERPL-MCNC: 9.3 MG/DL — SIGNIFICANT CHANGE UP (ref 8.4–10.5)
CHLORIDE SERPL-SCNC: 112 MMOL/L — HIGH (ref 98–107)
CO2 SERPL-SCNC: 23 MMOL/L — SIGNIFICANT CHANGE UP (ref 22–31)
CREAT SERPL-MCNC: 1.4 MG/DL — HIGH (ref 0.5–1.3)
GLUCOSE BLDC GLUCOMTR-MCNC: 126 MG/DL — HIGH (ref 70–99)
GLUCOSE BLDC GLUCOMTR-MCNC: 134 MG/DL — HIGH (ref 70–99)
GLUCOSE BLDC GLUCOMTR-MCNC: 154 MG/DL — HIGH (ref 70–99)
GLUCOSE BLDC GLUCOMTR-MCNC: 162 MG/DL — HIGH (ref 70–99)
GLUCOSE SERPL-MCNC: 122 MG/DL — HIGH (ref 70–99)
HCT VFR BLD CALC: 21.8 % — LOW (ref 39–50)
HCT VFR BLD CALC: 22.5 % — LOW (ref 39–50)
HCT VFR BLD CALC: 23.7 % — LOW (ref 39–50)
HGB BLD-MCNC: 7.5 G/DL — LOW (ref 13–17)
HGB BLD-MCNC: 7.6 G/DL — LOW (ref 13–17)
HGB BLD-MCNC: 8 G/DL — LOW (ref 13–17)
LDH SERPL L TO P-CCNC: 356 U/L — HIGH (ref 135–225)
MAGNESIUM SERPL-MCNC: 2.3 MG/DL — SIGNIFICANT CHANGE UP (ref 1.6–2.6)
MCHC RBC-ENTMCNC: 32.8 PG — SIGNIFICANT CHANGE UP (ref 27–34)
MCHC RBC-ENTMCNC: 33.2 PG — SIGNIFICANT CHANGE UP (ref 27–34)
MCHC RBC-ENTMCNC: 33.8 % — SIGNIFICANT CHANGE UP (ref 32–36)
MCHC RBC-ENTMCNC: 33.8 % — SIGNIFICANT CHANGE UP (ref 32–36)
MCHC RBC-ENTMCNC: 34.4 % — SIGNIFICANT CHANGE UP (ref 32–36)
MCHC RBC-ENTMCNC: 34.6 PG — HIGH (ref 27–34)
MCV RBC AUTO: 100.5 FL — HIGH (ref 80–100)
MCV RBC AUTO: 97.1 FL — SIGNIFICANT CHANGE UP (ref 80–100)
MCV RBC AUTO: 98.3 FL — SIGNIFICANT CHANGE UP (ref 80–100)
NRBC # FLD: 0 — SIGNIFICANT CHANGE UP
PHOSPHATE SERPL-MCNC: 3.6 MG/DL — SIGNIFICANT CHANGE UP (ref 2.5–4.5)
PLATELET # BLD AUTO: 261 K/UL — SIGNIFICANT CHANGE UP (ref 150–400)
PLATELET # BLD AUTO: 287 K/UL — SIGNIFICANT CHANGE UP (ref 150–400)
PLATELET # BLD AUTO: 293 K/UL — SIGNIFICANT CHANGE UP (ref 150–400)
PMV BLD: 10.4 FL — SIGNIFICANT CHANGE UP (ref 7–13)
PMV BLD: 10.5 FL — SIGNIFICANT CHANGE UP (ref 7–13)
PMV BLD: 10.7 FL — SIGNIFICANT CHANGE UP (ref 7–13)
POTASSIUM SERPL-MCNC: 4.2 MMOL/L — SIGNIFICANT CHANGE UP (ref 3.5–5.3)
POTASSIUM SERPL-SCNC: 4.2 MMOL/L — SIGNIFICANT CHANGE UP (ref 3.5–5.3)
PROT SERPL-MCNC: 6.9 G/DL — SIGNIFICANT CHANGE UP (ref 6–8.3)
RBC # BLD: 2.17 M/UL — LOW (ref 4.2–5.8)
RBC # BLD: 2.29 M/UL — LOW (ref 4.2–5.8)
RBC # BLD: 2.44 M/UL — LOW (ref 4.2–5.8)
RBC # FLD: 12.3 % — SIGNIFICANT CHANGE UP (ref 10.3–14.5)
RBC # FLD: 12.4 % — SIGNIFICANT CHANGE UP (ref 10.3–14.5)
RBC # FLD: 13.4 % — SIGNIFICANT CHANGE UP (ref 10.3–14.5)
RH IG SCN BLD-IMP: POSITIVE — SIGNIFICANT CHANGE UP
SODIUM SERPL-SCNC: 146 MMOL/L — HIGH (ref 135–145)
URATE SERPL-MCNC: 5.7 MG/DL — SIGNIFICANT CHANGE UP (ref 3.4–8.8)
WBC # BLD: 6.92 K/UL — SIGNIFICANT CHANGE UP (ref 3.8–10.5)
WBC # BLD: 6.97 K/UL — SIGNIFICANT CHANGE UP (ref 3.8–10.5)
WBC # BLD: 7.85 K/UL — SIGNIFICANT CHANGE UP (ref 3.8–10.5)
WBC # FLD AUTO: 6.92 K/UL — SIGNIFICANT CHANGE UP (ref 3.8–10.5)
WBC # FLD AUTO: 6.97 K/UL — SIGNIFICANT CHANGE UP (ref 3.8–10.5)
WBC # FLD AUTO: 7.85 K/UL — SIGNIFICANT CHANGE UP (ref 3.8–10.5)

## 2018-12-23 PROCEDURE — 99233 SBSQ HOSP IP/OBS HIGH 50: CPT

## 2018-12-23 PROCEDURE — 99232 SBSQ HOSP IP/OBS MODERATE 35: CPT | Mod: GC

## 2018-12-23 RX ADMIN — Medication 2: at 18:34

## 2018-12-23 RX ADMIN — Medication 2: at 12:46

## 2018-12-23 RX ADMIN — OXYCODONE HYDROCHLORIDE 10 MILLIGRAM(S): 5 TABLET ORAL at 10:20

## 2018-12-23 RX ADMIN — Medication 300 MILLIGRAM(S): at 12:46

## 2018-12-23 RX ADMIN — OXYCODONE HYDROCHLORIDE 10 MILLIGRAM(S): 5 TABLET ORAL at 22:03

## 2018-12-23 RX ADMIN — Medication 2: at 00:48

## 2018-12-23 RX ADMIN — Medication 100 MILLIGRAM(S): at 07:56

## 2018-12-23 RX ADMIN — OXYCODONE HYDROCHLORIDE 10 MILLIGRAM(S): 5 TABLET ORAL at 10:35

## 2018-12-23 RX ADMIN — Medication 600 MILLIGRAM(S): at 16:50

## 2018-12-23 RX ADMIN — OXYCODONE HYDROCHLORIDE 10 MILLIGRAM(S): 5 TABLET ORAL at 03:11

## 2018-12-23 RX ADMIN — Medication 100 MILLIGRAM(S): at 18:34

## 2018-12-23 RX ADMIN — OXYCODONE HYDROCHLORIDE 10 MILLIGRAM(S): 5 TABLET ORAL at 02:41

## 2018-12-23 RX ADMIN — OXYCODONE HYDROCHLORIDE 10 MILLIGRAM(S): 5 TABLET ORAL at 21:33

## 2018-12-23 NOTE — PROGRESS NOTE ADULT - ASSESSMENT
67 yo male PMHx HTN, diet controlled DM type 2, asthma, recent tonsillar mass Bx in clinic, admitted for airway protection s/p trach and peg- was on ENT service- biopsy shows DLBCL- transferred to medicine for chemo s/p Rituxumab yesterday and continuing chemotherapy until 12/23 nwo c/b by acute blood loss anemia, suspected GI source

## 2018-12-23 NOTE — PROGRESS NOTE ADULT - PROBLEM SELECTOR PLAN 2
-apprecaite heme recs, etoposide/cyclophosphamide until 12/23.   -daily tumor lysis labs.  -last day of chemotherapy today.

## 2018-12-23 NOTE — PROGRESS NOTE ADULT - REASON FOR ADMISSION
respiratory failure Respiratory failure, tonsillary DLBCL (high grade lymphoma), GCB subtype (germinal center, B cell like -- a better prognosis subset)

## 2018-12-23 NOTE — CHART NOTE - NSCHARTNOTEFT_GEN_A_CORE
Attempted to speak to patient regarding EGD; Patient became agitated and states that 'everyone just comes in and out' and that he feels fine. Explained that he would benefit from an EGD to evaluate upper GI source of bleed given dark material seen coming out of his PEG. Pt only wants his chemotherapy today at 4am and is refusing endoscopy.

## 2018-12-23 NOTE — PROGRESS NOTE ADULT - PROBLEM SELECTOR PLAN 1
patient with melanotic stool, melena appearing fluid in PEG site, new elevated BUN/Cre, all pointing towards UGI source.  -patient refused endoscopy yesterday  -GI to re-ses patient. If refuses will have psych se epatient as believe anger of being in hospital Is clouding sound medical decision making. Discussed with ADS who will follow up with GI after discusion and call psych if refuses EGD  -transfuse 1 unit today.

## 2018-12-23 NOTE — PROGRESS NOTE ADULT - ASSESSMENT
68M w/ h/o asthma, htn, DM (diet controlled) and recently diagnosed left sided tonsillar mass s/p non-diagnostic tonsil biopsy in the office 11/13/18 who presented to the ED with SOB s/p tracheostomy and pathology confirming DLBCL, GCB subtype    # DLBCL:   - core LN biopsy consistent with DLBCL GCB subtype, FISH negative for BCL6, MYC, MYC/IGH and IGH/BLC2 translocation  - also had an excisional LN bx on 12/15  - CT C/A/P with indeterminate adrenal nodules only   - hep panel negative, TTE with EF of 35%  - s/p BM biopsy on 12/19/18; pending results  - Treatment plan is R-CEOP regimen  - Day 1 (12/20): Rituximab and Prednisone 100 mg daily x 5 days  - Day 2 (12/21): Cyclophosphamide and Vincristine  - Day 2 - Day 4: planned for etoposide (ending on 12/23); can continue therapy today (12/23)  - start filgrastim 24 hours after completion of chemotherapy  - continue allopurinol  - please check tumor lysis labs (CMP with Phos, LDH, uric acid) daily    2. JAVIER:   - Cr stable at 1.40   - likely in setting of GI bleed +/- component from tumor lysis   - continue IVFs keeping cardiac function in mind with serial lung exams   - monitor tumor lysis labs daily (uric acid, LDH, phos, creatinine, K)     3. Anemia  - drop in hemoglobin (normal at admission to 7.5 today), which suspected upper GI bleed  - pt with melanotic stools, with FOBT +   - trend H/H q8 for now and continue protnoix gtt  - GI onboard - pt refusing EGD  - will continue chemotherapy as there is no reason to delay given aggressive nature of cancer; may mean longer term of more intensive supportive care, however.

## 2018-12-23 NOTE — PROGRESS NOTE ADULT - SUBJECTIVE AND OBJECTIVE BOX
Authored by Mason Waterman, DO: Beeper#[phone]    CHIOMA COOL  68y  Male      Patient is a 68y old  Male who presents with a chief complaint of respiratory failure (22 Dec 2018 12:31)      INTERVAL HPI/OVERNIGHT EVENTS: No acute events overnight. Patient with no episodes of melena.  Patient offers no complaints. Less cantankerous today. Agreeable to transfusion. Agreebal eto see GI for discussion of endsocopy.      Vital Signs Last 24 Hrs  T(C): 36.6 (23 Dec 2018 09:57), Max: 37.2 (22 Dec 2018 22:20)  T(F): 97.8 (23 Dec 2018 09:57), Max: 99 (22 Dec 2018 22:20)  HR: 85 (23 Dec 2018 09:57) (84 - 98)  BP: 119/84 (23 Dec 2018 09:57) (104/65 - 124/68)  BP(mean): --  RR: 18 (23 Dec 2018 09:57) (17 - 18)  SpO2: 100% (23 Dec 2018 09:57) (100% - 100%)    PHYSICAL EXAM:  GENERAL: NAD, well-groomed, well-developed. Agitated  HEENT: trach in place, protuberant tongue improving.   CHEST/LUNG: Clear to percussion bilaterally; No rales, rhonchi, wheezing, or rubs  HEART: Regular rate and rhythm; No murmurs, rubs, or gallops  ABDOMEN: Soft. PEG site c/d/i    EXTREMITIES:  2+ Peripheral Pulses, No clubbing, cyanosis, or edema  PSYCH: Alert & Oriented x3, poor insight into disease process.  Agitated  NERVOUS SYSTEM:  ; Motor Strength 5/5 B/L upper and lower extremities.  Sensory intact    Consultant(s) Notes Reviewed:  [x ] YES  [ ] NO  Care Discussed with Consultants/Other Providers [ x] YES  [ ] NO: GI fellow,  ADS     LABS:                                    7.5    6.92  )-----------( 261      ( 23 Dec 2018 07:00 )             21.8   12-23    146<H>  |  112<H>  |  44<H>  ----------------------------<  122<H>  4.2   |  23  |  1.40<H>    Ca    9.3      23 Dec 2018 07:00  Phos  3.6     12-23  Mg     2.3     12-23    TPro  6.9  /  Alb  3.3  /  TBili  0.3  /  DBili  x   /  AST  17  /  ALT  10  /  AlkPhos  51  12-23      RADIOLOGY & ADDITIONAL TESTS:    Imaging Personally Reviewed:  [ ] YES  [ ] NO

## 2018-12-23 NOTE — CHART NOTE - NSCHARTNOTEFT_GEN_A_CORE
Labs notable for hgb 7.5 and hct 21.8.  Unchanged from 1AM result.  Notes reviewed.  No recurrent black or melena stool.  Pt remained is hemodynamically stable.  abd pain w/o pain or tend on exam-  Pt amenable to blood transfusion after lengthy discussion informing him risks vs benefits.  GI consulted to revisit need for scope with pt.  Pt continues to declined scope.  Psych consulted and will see pt tomorrow as no acute GI blood loss or pending emergent scope.  GI informed of my discussion w/ psych.  Will attempt to have pt's relatives present tomorrow for psych and GI discussion. Labs notable for hgb 7.5 and hct 21.8.  Unchanged from 1AM result.  Notes reviewed.  No recurrent black or melena stool.  Pt remained is hemodynamically stable.  abd pain w/o pain or tend on exam-  Pt amenable to blood transfusion after lengthy discussion informing him risks vs benefits.  GI consulted to revisit need for scope with pt.  Pt continues to declined scope.  Psych consulted and will see pt tomorrow as no acute GI blood loss or pending emergent scope.  GI informed of my discussion w/ psych.  Will attempt to have pt's relatives present tomorrow for psych and GI discussion.      Update: 18:40-  KOKI Luque and myself spoke with pt's son Luis but he was not able to provide us with a time when he would likely come to hospital tomorrow for discussion with pt regarding scope.

## 2018-12-24 ENCOUNTER — RESULT REVIEW (OUTPATIENT)
Age: 68
End: 2018-12-24

## 2018-12-24 DIAGNOSIS — E87.0 HYPEROSMOLALITY AND HYPERNATREMIA: ICD-10-CM

## 2018-12-24 LAB
ALBUMIN SERPL ELPH-MCNC: 3.3 G/DL — SIGNIFICANT CHANGE UP (ref 3.3–5)
ALP SERPL-CCNC: 50 U/L — SIGNIFICANT CHANGE UP (ref 40–120)
ALT FLD-CCNC: 10 U/L — SIGNIFICANT CHANGE UP (ref 4–41)
AST SERPL-CCNC: 16 U/L — SIGNIFICANT CHANGE UP (ref 4–40)
BILIRUB SERPL-MCNC: 0.5 MG/DL — SIGNIFICANT CHANGE UP (ref 0.2–1.2)
BUN SERPL-MCNC: 31 MG/DL — HIGH (ref 7–23)
CALCIUM SERPL-MCNC: 9.5 MG/DL — SIGNIFICANT CHANGE UP (ref 8.4–10.5)
CHLORIDE SERPL-SCNC: 115 MMOL/L — HIGH (ref 98–107)
CO2 SERPL-SCNC: 24 MMOL/L — SIGNIFICANT CHANGE UP (ref 22–31)
CREAT SERPL-MCNC: 1.33 MG/DL — HIGH (ref 0.5–1.3)
GLUCOSE BLDC GLUCOMTR-MCNC: 120 MG/DL — HIGH (ref 70–99)
GLUCOSE BLDC GLUCOMTR-MCNC: 165 MG/DL — HIGH (ref 70–99)
GLUCOSE SERPL-MCNC: 118 MG/DL — HIGH (ref 70–99)
HCT VFR BLD CALC: 23.7 % — LOW (ref 39–50)
HGB BLD-MCNC: 8 G/DL — LOW (ref 13–17)
MAGNESIUM SERPL-MCNC: 2.4 MG/DL — SIGNIFICANT CHANGE UP (ref 1.6–2.6)
MCHC RBC-ENTMCNC: 33.1 PG — SIGNIFICANT CHANGE UP (ref 27–34)
MCHC RBC-ENTMCNC: 33.8 % — SIGNIFICANT CHANGE UP (ref 32–36)
MCV RBC AUTO: 97.9 FL — SIGNIFICANT CHANGE UP (ref 80–100)
NRBC # FLD: 0 — SIGNIFICANT CHANGE UP
PHOSPHATE SERPL-MCNC: 3.2 MG/DL — SIGNIFICANT CHANGE UP (ref 2.5–4.5)
PLATELET # BLD AUTO: 263 K/UL — SIGNIFICANT CHANGE UP (ref 150–400)
PMV BLD: 10.6 FL — SIGNIFICANT CHANGE UP (ref 7–13)
POTASSIUM SERPL-MCNC: 3.9 MMOL/L — SIGNIFICANT CHANGE UP (ref 3.5–5.3)
POTASSIUM SERPL-SCNC: 3.9 MMOL/L — SIGNIFICANT CHANGE UP (ref 3.5–5.3)
PROT SERPL-MCNC: 6.8 G/DL — SIGNIFICANT CHANGE UP (ref 6–8.3)
RBC # BLD: 2.42 M/UL — LOW (ref 4.2–5.8)
RBC # FLD: 13.4 % — SIGNIFICANT CHANGE UP (ref 10.3–14.5)
SODIUM SERPL-SCNC: 150 MMOL/L — HIGH (ref 135–145)
TM INTERPRETATION: SIGNIFICANT CHANGE UP
WBC # BLD: 6.48 K/UL — SIGNIFICANT CHANGE UP (ref 3.8–10.5)
WBC # FLD AUTO: 6.48 K/UL — SIGNIFICANT CHANGE UP (ref 3.8–10.5)

## 2018-12-24 PROCEDURE — 88342 IMHCHEM/IMCYTCHM 1ST ANTB: CPT | Mod: 26

## 2018-12-24 PROCEDURE — 88341 IMHCHEM/IMCYTCHM EA ADD ANTB: CPT | Mod: 26

## 2018-12-24 PROCEDURE — 99233 SBSQ HOSP IP/OBS HIGH 50: CPT

## 2018-12-24 PROCEDURE — 43239 EGD BIOPSY SINGLE/MULTIPLE: CPT | Mod: GC

## 2018-12-24 PROCEDURE — 90792 PSYCH DIAG EVAL W/MED SRVCS: CPT

## 2018-12-24 PROCEDURE — 88312 SPECIAL STAINS GROUP 1: CPT | Mod: 26

## 2018-12-24 PROCEDURE — 99233 SBSQ HOSP IP/OBS HIGH 50: CPT | Mod: GC

## 2018-12-24 PROCEDURE — 88305 TISSUE EXAM BY PATHOLOGIST: CPT | Mod: 26

## 2018-12-24 RX ORDER — PANTOPRAZOLE SODIUM 20 MG/1
40 TABLET, DELAYED RELEASE ORAL DAILY
Qty: 0 | Refills: 0 | Status: DISCONTINUED | OUTPATIENT
Start: 2018-12-24 | End: 2018-12-31

## 2018-12-24 RX ORDER — FILGRASTIM 480MCG/1.6
480 VIAL (ML) INJECTION DAILY
Qty: 0 | Refills: 0 | Status: COMPLETED | OUTPATIENT
Start: 2018-12-24 | End: 2018-12-27

## 2018-12-24 RX ADMIN — OXYCODONE HYDROCHLORIDE 10 MILLIGRAM(S): 5 TABLET ORAL at 21:57

## 2018-12-24 RX ADMIN — Medication 100 MILLIGRAM(S): at 06:15

## 2018-12-24 RX ADMIN — OXYCODONE HYDROCHLORIDE 10 MILLIGRAM(S): 5 TABLET ORAL at 06:15

## 2018-12-24 RX ADMIN — Medication 2: at 12:37

## 2018-12-24 RX ADMIN — OXYCODONE HYDROCHLORIDE 10 MILLIGRAM(S): 5 TABLET ORAL at 20:57

## 2018-12-24 RX ADMIN — PANTOPRAZOLE SODIUM 10 MG/HR: 20 TABLET, DELAYED RELEASE ORAL at 05:16

## 2018-12-24 NOTE — BEHAVIORAL HEALTH ASSESSMENT NOTE - NSBHCHARTREVIEWLAB_PSY_A_CORE FT
8.0    6.48  )-----------( 263      ( 24 Dec 2018 06:50 )             23.7     12-24    150<H>  |  115<H>  |  31<H>  ----------------------------<  118<H>  3.9   |  24  |  1.33<H>    Ca    9.5      24 Dec 2018 06:50  Phos  3.2     12-24  Mg     2.4     12-24    TPro  6.8  /  Alb  3.3  /  TBili  0.5  /  DBili  x   /  AST  16  /  ALT  10  /  AlkPhos  50  12-24

## 2018-12-24 NOTE — PROGRESS NOTE ADULT - PROBLEM SELECTOR PLAN 1
Patient with hemodynamically mediated JAVIER in setting of IV contrast, ARB use and low BP. Latest Scr. is elevated at 1.33 today. Patient is non oliguric. Renal ultrasound shows no hydronephrosis. Recommend to start on D5W at 75 cc/hr for 24 hours. Continue to hold ARBs for now. Monitor BMP daily. Avoid NSAIDs, RCAs, and other nephrotoxins.

## 2018-12-24 NOTE — PROGRESS NOTE ADULT - SUBJECTIVE AND OBJECTIVE BOX
Patient is a 68y old  Male who presents with a chief complaint of respiratory failure (24 Dec 2018 13:31)      SUBJECTIVE / OVERNIGHT EVENTS:  Patient seen and examined this morning. No overnight events. Denies any chest pain, shortness of breath, fevers, chills, nausea or vomiting. Son at bedside, now the patient agrees for endoscopy.     MEDICATIONS  (STANDING):  allopurinol 300 milliGRAM(s) Oral daily  dextrose 5%. 1000 milliLiter(s) (50 mL/Hr) IV Continuous <Continuous>  dextrose 50% Injectable 12.5 Gram(s) IV Push once  dextrose 50% Injectable 25 Gram(s) IV Push once  dextrose 50% Injectable 25 Gram(s) IV Push once  docusate sodium Liquid 100 milliGRAM(s) Oral two times a day  insulin lispro (HumaLOG) corrective regimen sliding scale   SubCutaneous every 6 hours  pantoprazole Infusion 8 mG/Hr (10 mL/Hr) IV Continuous <Continuous>  senna Syrup 10 milliLiter(s) Oral at bedtime    MEDICATIONS  (PRN):  acetaminophen    Suspension .. 650 milliGRAM(s) Oral every 6 hours PRN Mild Pain (1 - 3)  dextrose 40% Gel 15 Gram(s) Oral once PRN Blood Glucose LESS THAN 70 milliGRAM(s)/deciliter  diphenhydrAMINE   Injectable 25 milliGRAM(s) IV Push once PRN REACTION  glucagon  Injectable 1 milliGRAM(s) IntraMuscular once PRN Glucose LESS THAN 70 milligrams/deciliter  hydrocortisone sodium succinate Injectable 100 milliGRAM(s) IV Push once PRN REACTION  meperidine     Injectable 25 milliGRAM(s) IV Push once PRN RIGORS  oxyCODONE    Solution 10 milliGRAM(s) Oral every 6 hours PRN Severe Pain (7 - 10)  oxyCODONE    Solution 5 milliGRAM(s) Oral every 6 hours PRN Moderate Pain (4 - 6)      PHYSICAL EXAM:  T(C): 36.4 (12-24-18 @ 13:00), Max: 37 (12-23-18 @ 21:29)  HR: 74 (12-24-18 @ 13:00) (70 - 78)  BP: 142/74 (12-24-18 @ 13:00) (118/61 - 142/74)  RR: 18 (12-24-18 @ 13:00) (16 - 18)  SpO2: 99% (12-24-18 @ 13:00) (98% - 100%)  I&O's Summary    23 Dec 2018 07:01  -  24 Dec 2018 07:00  --------------------------------------------------------  IN: 150 mL / OUT: 950 mL / NET: -800 mL    24 Dec 2018 07:01  -  24 Dec 2018 15:22  --------------------------------------------------------  IN: 0 mL / OUT: 650 mL / NET: -650 mL      GENERAL: NAD, well-developed  HEAD:  Atraumatic, Normocephalic  EYES: EOMI, PERRLA, conjunctiva and sclera clear  NECK: trach, Cervical lymphadenopathy  CHEST/LUNG: Clear to auscultation bilaterally; No wheeze, some mild b/l rhonchi  HEART: Regular rate and rhythm; No murmurs, rubs, or gallops  ABDOMEN: Soft, Nontender, Nondistended; Bowel sounds present  EXTREMITIES:  2+ Peripheral Pulses, No clubbing, cyanosis, or edema  PSYCH: AAOx3  NEUROLOGY: CN II-XII grossly intact, moving all extremities  SKIN: No rashes or lesions    LABS:  CAPILLARY BLOOD GLUCOSE      POCT Blood Glucose.: 165 mg/dL (24 Dec 2018 12:24)  POCT Blood Glucose.: 120 mg/dL (24 Dec 2018 06:14)  POCT Blood Glucose.: 134 mg/dL (23 Dec 2018 23:42)  POCT Blood Glucose.: 162 mg/dL (23 Dec 2018 18:24)                          8.0    6.48  )-----------( 263      ( 24 Dec 2018 06:50 )             23.7     12-24    150<H>  |  115<H>  |  31<H>  ----------------------------<  118<H>  3.9   |  24  |  1.33<H>    Ca    9.5      24 Dec 2018 06:50  Phos  3.2     12-24  Mg     2.4     12-24    TPro  6.8  /  Alb  3.3  /  TBili  0.5  /  DBili  x   /  AST  16  /  ALT  10  /  AlkPhos  50  12-24              RADIOLOGY & ADDITIONAL TESTS:    Imaging Personally Reviewed:    Consultant(s) Notes Reviewed:      Care Discussed with Consultants/Other Providers: Case discussed with Dr. Isaacs, psychiatrist

## 2018-12-24 NOTE — PROGRESS NOTE ADULT - PROBLEM SELECTOR PLAN 1
s/p transfusion  suspected GI source, initially apprehensive for endoscopy, now amenable after son's visit today  Hgb monitored

## 2018-12-24 NOTE — CHART NOTE - NSCHARTNOTEFT_GEN_A_CORE
GI Note: Post Procedure    EGD performed, full report to follow.    Findings:  Gastric ulcers x 2 in gastric body, clean based with overlying eschar, largest ulcer biopsied. No stigmata of recent bleeding found.  Gastric antrum with gastritis, biopsied for HP    Recommendations:  resume feeding as tolerated  PO PPI daily  f/u pathology    Please call with questions  Jennifer Ahn  GI Fellow  Pager: [phone]

## 2018-12-24 NOTE — PROGRESS NOTE ADULT - ASSESSMENT
68M w/ h/o asthma, htn, DM (diet controlled) and recently diagnosed left sided tonsillar mass s/p non-diagnostic tonsil biopsy in the office 11/13/18 who presented to the ED with SOB s/p tracheostomy and pathology confirming DLBCL, GCB subtype    # DLBCL:   - core LN biopsy consistent with DLBCL GCB subtype, FISH negative for BCL6, MYC, MYC/IGH and IGH/BLC2 translocation  - also had an excisional LN bx on 12/15  - CT C/A/P with indeterminate adrenal nodules only   - hep panel negative, TTE with EF of 35%  - s/p BM biopsy on 12/19/18; pending results  - Treatment plan is R-CEOP regimen  - Day 1 (12/20): Rituximab and Prednisone 100 mg daily x 5 days  - Day 2 (12/21): Cyclophosphamide and Vincristine  - Day 2 - Day 4: Etoposide (ended on 12/23)  - start filgrastim 24 hours after completion of chemotherapy today (dose of 480 mcg SQ) to continue daily until discharged OR x 3 days.   - continue allopurinol  - please check tumor lysis labs (CMP with Phos, LDH, uric acid) daily    2. JAVIER:   - Cr stable at 1.33    - continue IVFs keeping cardiac function in mind with serial lung exams   - monitor tumor lysis labs daily (uric acid, LDH, phos, creatinine, K)     3. Anemia  - drop in hemoglobin (normal at admission to 8.0 today), with suspected upper GI bleed  - pt with melanotic stools, with FOBT +   - trend CBC closel  - GI onboard - pt previously refusing EGD but now he is accepting with his son here.

## 2018-12-24 NOTE — BEHAVIORAL HEALTH ASSESSMENT NOTE - DESCRIPTION
HTN, diet controlled DM type 2, asthma, recent tonsillar mass Bx in clinic with dx of lymphoma. s/p recent trach/peg.

## 2018-12-24 NOTE — PROGRESS NOTE ADULT - SUBJECTIVE AND OBJECTIVE BOX
Chief Complaint:  Patient is a 68y old  Male who presents with a chief complaint of respiratory failure (23 Dec 2018 14:28)      Interval Events: Pt continues to refuse endoscopy. Hgb 8.0.  ROS: All 12 point system except listed above were otherwise negative.    Allergies:  No Known Allergies        Hospital Medications:  acetaminophen    Suspension .. 650 milliGRAM(s) Oral every 6 hours PRN  allopurinol 300 milliGRAM(s) Oral daily  dextrose 40% Gel 15 Gram(s) Oral once PRN  dextrose 5%. 1000 milliLiter(s) IV Continuous <Continuous>  dextrose 50% Injectable 12.5 Gram(s) IV Push once  dextrose 50% Injectable 25 Gram(s) IV Push once  dextrose 50% Injectable 25 Gram(s) IV Push once  diphenhydrAMINE   Injectable 25 milliGRAM(s) IV Push once PRN  docusate sodium Liquid 100 milliGRAM(s) Oral two times a day  glucagon  Injectable 1 milliGRAM(s) IntraMuscular once PRN  hydrocortisone sodium succinate Injectable 100 milliGRAM(s) IV Push once PRN  insulin lispro (HumaLOG) corrective regimen sliding scale   SubCutaneous every 6 hours  meperidine     Injectable 25 milliGRAM(s) IV Push once PRN  oxyCODONE    Solution 10 milliGRAM(s) Oral every 6 hours PRN  oxyCODONE    Solution 5 milliGRAM(s) Oral every 6 hours PRN  pantoprazole Infusion 8 mG/Hr IV Continuous <Continuous>  senna Syrup 10 milliLiter(s) Oral at bedtime  sodium chloride 0.9%. 1000 milliLiter(s) IV Continuous <Continuous>      PMHX/PSHX:  Sleep apnea  Asthma  Localized swelling, mass and lump, head  Tonsil cancer  DM (diabetes mellitus)  HTN (hypertension)  History of surgery  Forearm fracture      Family history:  No pertinent family history in first degree relatives    There is no family history of peptic ulcer disease, gastric cancer, colon polyps, colon cancer, celiac disease, biliary, hepatic, or pancreatic disease.  None of the female relatives have breast, uterine, or ovarian cancer.     PHYSICAL EXAM:   Vital Signs:  Vital Signs Last 24 Hrs  T(C): 36.7 (24 Dec 2018 06:00), Max: 37 (23 Dec 2018 14:00)  T(F): 98.1 (24 Dec 2018 06:00), Max: 98.6 (23 Dec 2018 14:00)  HR: 78 (24 Dec 2018 06:00) (75 - 95)  BP: 121/75 (24 Dec 2018 06:00) (116/77 - 125/66)  BP(mean): --  RR: 17 (24 Dec 2018 06:00) (17 - 18)  SpO2: 100% (24 Dec 2018 06:00) (99% - 100%)  Daily Height in cm: 182.51 (24 Dec 2018 03:33)    Daily     PHYSICAL EXAM:     GENERAL:  Appears stated age, well-groomed, well-nourished  HEENT:  NC/AT, anicteric, +trach  CHEST:  Full & symmetric excursion, no increased effort, breath sounds clear  HEART:  Regular rhythm, S1, S2  ABDOMEN: Refused abdominal exam, PEG site dressed in bandage  EXTREMITIES:  no cyanosis, clubbing or edema  SKIN:  No rash/erythema/ecchymoses/petechiae/wounds/abscess/warm/dry  NEURO: No focal deficits  Psych: Alert and oriented but poor insight    LABS:                        8.0    6.48  )-----------( 263      ( 24 Dec 2018 06:50 )             23.7     Mean Cell Volume: 97.9 fL (12-24-18 @ 06:50)    12-23    146<H>  |  112<H>  |  44<H>  ----------------------------<  122<H>  4.2   |  23  |  1.40<H>    Ca    9.3      23 Dec 2018 07:00  Phos  3.6     12-23  Mg     2.3     12-23    TPro  6.9  /  Alb  3.3  /  TBili  0.3  /  DBili  x   /  AST  17  /  ALT  10  /  AlkPhos  51  12-23    LIVER FUNCTIONS - ( 23 Dec 2018 07:00 )  Alb: 3.3 g/dL / Pro: 6.9 g/dL / ALK PHOS: 51 u/L / ALT: 10 u/L / AST: 17 u/L / GGT: x                                       8.0    6.48  )-----------( 263      ( 24 Dec 2018 06:50 )             23.7                         8.0    7.85  )-----------( 287      ( 23 Dec 2018 18:20 )             23.7                         7.5    6.92  )-----------( 261      ( 23 Dec 2018 07:00 )             21.8                         7.6    6.97  )-----------( 293      ( 23 Dec 2018 01:20 )             22.5                         7.5    7.80  )-----------( 252      ( 22 Dec 2018 16:30 )             21.8     Imaging:

## 2018-12-24 NOTE — PROGRESS NOTE ADULT - PROBLEM SELECTOR PLAN 2
Patient with hypernatremia in setting of insensible losses, decreased free water intake, and recent chemotherapy. Latest serum sodium noted to be 150 today. Recommend to start patient on free water supplementation via PEG tube @ 300 cc q6h. Check urine osmolality. Monitor serum sodium.

## 2018-12-24 NOTE — BEHAVIORAL HEALTH ASSESSMENT NOTE - CASE SUMMARY
69 yo male no PPH, PMHx HTN, diet controlled DM type 2, asthma, recent tonsillar mass Bx in clinic with dx of lymphoma, admitted for airway protection s/p trach and peg, transferred to medicine for chemo s/p Rituxumab, course now c/b by acute blood loss anemia with suspected GI source. GI plans to do non-emergent endoscopy; Psych consulted for capacity to refuse non-emergent endoscopy as pt was reportedly refusing procedure. Today pt states that he is amenable to endoscopy. He verbalizes understanding of risks (including but not limited to bleeding/infection) and benefits. He is AAOx3. No psych issues evident.    Pt has capacity to sign for endoscopy at this time. Pt's son in agreement with endoscopy and is willing to co-sign if necessary for procedure.

## 2018-12-24 NOTE — PROGRESS NOTE ADULT - ASSESSMENT
69 yo male PMHx HTN, diet controlled DM type 2, asthma, recent tonsillar mass Bx in clinic, admitted for airway protection s/p trach and peg- was on ENT service- biopsy shows DLBCL- transferred to medicine for chemo s/p Rituxumab yesterday and continuing chemotherapy until 12/23 nwo c/b by acute blood loss anemia, suspected GI source, initially apprehensive for endoscopy, now amenable after son's visit today.

## 2018-12-24 NOTE — PROGRESS NOTE ADULT - SUBJECTIVE AND OBJECTIVE BOX
INTERVAL HPI/OVERNIGHT EVENTS:  Patient S&E at bedside. No o/n events, patient with improvement in tongue swelling and speech. Patient with evidence of melena this weekend with downtrending hemoglobin, had been refusing endoscopy but now agreeable as son is here.     VITAL SIGNS:  T(F): 98.1 (12-24-18 @ 10:00)  HR: 70 (12-24-18 @ 10:00)  BP: 135/78 (12-24-18 @ 10:00)  RR: 16 (12-24-18 @ 10:00)  SpO2: 98% (12-24-18 @ 10:00)  Wt(kg): --    PHYSICAL EXAM:    Constitutional: NAD  Eyes: EOMI, sclera non-icteric  Neck: trach in place.   Oral: Tongue protrusion/swelling resolved, speech improved.   Respiratory: CTA b/l, good air entry b/l  Cardiovascular: RRR, no M/R/G  Gastrointestinal: PEG in placesoft, NTND, no masses palpable, + BS, no hepatosplenomegaly  Extremities: no c/c/e  Neurological: AAOx3      MEDICATIONS  (STANDING):  allopurinol 300 milliGRAM(s) Oral daily  dextrose 5%. 1000 milliLiter(s) (50 mL/Hr) IV Continuous <Continuous>  dextrose 50% Injectable 12.5 Gram(s) IV Push once  dextrose 50% Injectable 25 Gram(s) IV Push once  dextrose 50% Injectable 25 Gram(s) IV Push once  docusate sodium Liquid 100 milliGRAM(s) Oral two times a day  insulin lispro (HumaLOG) corrective regimen sliding scale   SubCutaneous every 6 hours  pantoprazole Infusion 8 mG/Hr (10 mL/Hr) IV Continuous <Continuous>  senna Syrup 10 milliLiter(s) Oral at bedtime  sodium chloride 0.9%. 1000 milliLiter(s) (75 mL/Hr) IV Continuous <Continuous>    MEDICATIONS  (PRN):  acetaminophen    Suspension .. 650 milliGRAM(s) Oral every 6 hours PRN Mild Pain (1 - 3)  dextrose 40% Gel 15 Gram(s) Oral once PRN Blood Glucose LESS THAN 70 milliGRAM(s)/deciliter  diphenhydrAMINE   Injectable 25 milliGRAM(s) IV Push once PRN REACTION  glucagon  Injectable 1 milliGRAM(s) IntraMuscular once PRN Glucose LESS THAN 70 milligrams/deciliter  hydrocortisone sodium succinate Injectable 100 milliGRAM(s) IV Push once PRN REACTION  meperidine     Injectable 25 milliGRAM(s) IV Push once PRN RIGORS  oxyCODONE    Solution 10 milliGRAM(s) Oral every 6 hours PRN Severe Pain (7 - 10)  oxyCODONE    Solution 5 milliGRAM(s) Oral every 6 hours PRN Moderate Pain (4 - 6)      Allergies    No Known Allergies    Intolerances        LABS:                        8.0    6.48  )-----------( 263      ( 24 Dec 2018 06:50 )             23.7     12-24    150<H>  |  115<H>  |  31<H>  ----------------------------<  118<H>  3.9   |  24  |  1.33<H>    Ca    9.5      24 Dec 2018 06:50  Phos  3.2     12-24  Mg     2.4     12-24    TPro  6.8  /  Alb  3.3  /  TBili  0.5  /  DBili  x   /  AST  16  /  ALT  10  /  AlkPhos  50  12-24      Uric Acid, Serum: 5.7 mg/dL (12.23.18 @ 07:00)    Lactate Dehydrogenase, Serum: 356 U/L (12.23.18 @ 07:00)        RADIOLOGY & ADDITIONAL TESTS:  Studies reviewed.

## 2018-12-24 NOTE — BEHAVIORAL HEALTH ASSESSMENT NOTE - HPI (INCLUDE ILLNESS QUALITY, SEVERITY, DURATION, TIMING, CONTEXT, MODIFYING FACTORS, ASSOCIATED SIGNS AND SYMPTOMS)
69 yo male no PPH, PMHx HTN, diet controlled DM type 2, asthma, recent tonsillar mass Bx in clinic with dx of lymphoma, admitted for airway protection s/p trach and peg, transferred to medicine for chemo s/p Rituxumab, course now c/b by acute blood loss anemia with suspected GI source. GI plans to do non-emergent endoscopy; Psych consulted for capacity to refuse non-emergent endoscopy as pt was reportedly refusing procedure.     Today pt states that he is amenable to endoscopy. He verbalizes understanding of risks (including but not limited to bleeding/infection) and benefits. He is AAOx3.     Pt's son present at bedside. States that father at baseline is oriented. Says that father understands what has been explained to him and the need for endoscopy. States that earlier refusal was based on pt having more questions about procedure.

## 2018-12-24 NOTE — PROGRESS NOTE ADULT - ASSESSMENT
Impression:  1) Melena- Differential diagnosis includes PUD, erosive gastropathy/esophagitis, angiectasia, malignancy (lymphoma), Dieulafoy's lesion  2) Respiratory failure sp trach and PEG  3) DLBCL, tonsillar mass    Recommendations:  - Pt currently refusing endoscopic intervention  - Continue to monitor BM  - Serial CBC q8 hours  - 2 large bore IVs  - PPI gtt  - Rest of care per primary team Impression:  1) Melena- Differential diagnosis includes PUD, erosive gastropathy/esophagitis, angiectasia, malignancy (lymphoma), Dieulafoy's lesion  2) Respiratory failure sp trach and PEG  3) DLBCL, tonsillar mass    Recommendations:  - Pt currently refusing endoscopic intervention  - Continue to monitor BM  - Serial CBC q8 hours  - 2 large bore IVs  - Correct hypernatremia  - PPI gtt  - Rest of care per primary team

## 2018-12-24 NOTE — CHART NOTE - NSCHARTNOTEFT_GEN_A_CORE
GI Note:    Received notification from NP that patient is now agreeable to upper endoscopy to evaluate for melena. Patient has been NPO past MN.   Plan for EGD today    Please call with questions  Jennifer Ahn  GI Fellow  Pager: [phone]

## 2018-12-24 NOTE — BEHAVIORAL HEALTH ASSESSMENT NOTE - NSBHCHARTREVIEWVS_PSY_A_CORE FT
T(C): 36.4 (12-24-18 @ 13:00), Max: 37 (12-23-18 @ 21:29)  HR: 74 (12-24-18 @ 13:00) (70 - 78)  BP: 142/74 (12-24-18 @ 13:00) (118/61 - 142/74)  RR: 18 (12-24-18 @ 13:00) (16 - 18)  SpO2: 99% (12-24-18 @ 13:00) (98% - 100%)  Wt(kg): --

## 2018-12-24 NOTE — PROGRESS NOTE ADULT - SUBJECTIVE AND OBJECTIVE BOX
[facility] Division of Kidney Diseases & Hypertension  FOLLOW UP NOTE  [phone]--------------------------------------------------------------------------------    HPI: 68 year old male with h/o DM, HTN presented to [facility] on 12/4/18 with complaints of SOB. Patient was found to have large left tonsillar mass invading base of tongue and has tracheostomy done on 12/5/18. 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 Nephrology was consulted for JAVIER. On review of labs prior to admisson, Scr. has been WNL. Scr. on admission was also noted to be WNL(1.22) on 12/4/18. However since 12/13/18, Scr. was noted to be trending up and was elevated at 1.44 on 12/20/18. On review of inpatient chart, patient was noted to have relatively low BP readings since 12/13. Patient has been on losartan and HCTZ for BP control. Patient also had CT chest and abdomen with IV contrast as well on 12/18/18. Patient received IVF and Scr. improved to 1.3 on 12/21/18. However Scr. had increased to 1.4 on 12/23 and is 1.33 today and hence nephrology was reconsulted.     Patient seen and examined. Denies c/o SOB, CP, abdominal pain, nausea, vomiting or diarrhea.       PAST HISTORY  --------------------------------------------------------------------------------  No significant changes to PMH, PSH, FHx, SHx, unless otherwise noted    ALLERGIES & MEDICATIONS  --------------------------------------------------------------------------------  Allergies    No Known Allergies    Intolerances      Standing Inpatient Medications  allopurinol 300 milliGRAM(s) Oral daily  dextrose 5%. 1000 milliLiter(s) IV Continuous <Continuous>  dextrose 50% Injectable 12.5 Gram(s) IV Push once  dextrose 50% Injectable 25 Gram(s) IV Push once  dextrose 50% Injectable 25 Gram(s) IV Push once  docusate sodium Liquid 100 milliGRAM(s) Oral two times a day  insulin lispro (HumaLOG) corrective regimen sliding scale   SubCutaneous every 6 hours  pantoprazole Infusion 8 mG/Hr IV Continuous <Continuous>  senna Syrup 10 milliLiter(s) Oral at bedtime    PRN Inpatient Medications  acetaminophen    Suspension .. 650 milliGRAM(s) Oral every 6 hours PRN  dextrose 40% Gel 15 Gram(s) Oral once PRN  diphenhydrAMINE   Injectable 25 milliGRAM(s) IV Push once PRN  glucagon  Injectable 1 milliGRAM(s) IntraMuscular once PRN  hydrocortisone sodium succinate Injectable 100 milliGRAM(s) IV Push once PRN  meperidine     Injectable 25 milliGRAM(s) IV Push once PRN  oxyCODONE    Solution 10 milliGRAM(s) Oral every 6 hours PRN  oxyCODONE    Solution 5 milliGRAM(s) Oral every 6 hours PRN      REVIEW OF SYSTEMS  --------------------------------------------------------------------------------    Gen: No  fevers/chills  Head/Eyes/Ears/Mouth: Headache +;  Respiratory: No dyspnea  CV: No chest pain  GI: No abdominal pain, diarrhea,  nausea, vomiting  MSK:  no edema      All other systems were reviewed and are negative, except as noted.    VITALS/PHYSICAL EXAM  --------------------------------------------------------------------------------  T(C): 36.4 (12-24-18 @ 13:00), Max: 37 (12-23-18 @ 14:00)  HR: 74 (12-24-18 @ 13:00) (70 - 95)  BP: 142/74 (12-24-18 @ 13:00) (116/77 - 142/74)  RR: 18 (12-24-18 @ 13:00) (16 - 18)  SpO2: 99% (12-24-18 @ 13:00) (98% - 100%)  Wt(kg): --  Height (cm): 182.51 (12-24-18 @ 03:33)  Weight (kg): 97.5 (12-24-18 @ 03:33)  BMI (kg/m2): 29.3 (12-24-18 @ 03:33)  BSA (m2): 2.19 (12-24-18 @ 03:33)      12-23-18 @ 07:01  -  12-24-18 @ 07:00  --------------------------------------------------------  IN: 150 mL / OUT: 950 mL / NET: -800 mL    12-24-18 @ 07:01  -  12-24-18 @ 13:31  --------------------------------------------------------  IN: 0 mL / OUT: 650 mL / NET: -650 mL      Physical Exam:  	  Gen: NAD  	HEENT: sclera anicteric; tracheostomy tube present.   	Pulm: CTA B/L  	CV:  S1S2  	Abd: +BS, soft   	Ext: trace edema  	Neuro: awake and alert.   	Skin: Warm and dry     LABS/STUDIES  --------------------------------------------------------------------------------              8.0    6.48  >-----------<  263      [12-24-18 @ 06:50]              23.7     150  |  115  |  31  ----------------------------<  118      [12-24-18 @ 06:50]  3.9   |  24  |  1.33        Ca     9.5     [12-24-18 @ 06:50]      Mg     2.4     [12-24-18 @ 06:50]      Phos  3.2     [12-24-18 @ 06:50]    TPro  6.8  /  Alb  3.3  /  TBili  0.5  /  DBili  x   /  AST  16  /  ALT  10  /  AlkPhos  50  [12-24-18 @ 06:50]        Uric acid 5.7      [12-23-18 @ 07:00]        [12-23-18 @ 07:00]    Creatinine Trend:  SCr 1.33 [12-24 @ 06:50]  SCr 1.40 [12-23 @ 07:00]  SCr 1.40 [12-22 @ 05:40]  SCr 1.30 [12-21 @ 06:05]  SCr 1.44 [12-20 @ 10:00]    Urinalysis - [12-20-18 @ 20:24]      Color YELLOW / Appearance CLEAR / SG 1.021 / pH 6.0      Gluc NEGATIVE / Ketone NEGATIVE  / Bili NEGATIVE / Urobili TRACE       Blood NEGATIVE / Protein 10 / Leuk Est NEGATIVE / Nitrite NEGATIVE      RBC  / WBC  / Hyaline  / Gran  / Sq Epi  / Non Sq Epi  / Bacteria     Urine Creatinine 143.50      [12-20-18 @ 20:24]  Urine Protein 13.4      [12-20-18 @ 20:24]  Urine Sodium 45      [12-20-18 @ 20:24]  Urine Potassium 50.1      [12-20-18 @ 20:24]  Urine Chloride 27      [12-20-18 @ 20:24] [facility] Division of Kidney Diseases & Hypertension  FOLLOW UP NOTE  [phone]--------------------------------------------------------------------------------    HPI: 68 year old male with h/o DM, HTN presented to [facility] on 12/4/18 with complaints of SOB. Patient was found to have large left tonsillar mass invading base of tongue and has tracheostomy done on 12/5/18. 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 Nephrology was consulted for JAVIER. On review of labs prior to admisson, Scr. has been WNL. Scr. on admission was also noted to be WNL(1.22) on 12/4/18. However since 12/13/18, Scr. was noted to be trending up and was elevated at 1.44 on 12/20/18. On review of inpatient chart, patient was noted to have relatively low BP readings since 12/13. Patient has been on losartan and HCTZ for BP control. Patient also had CT chest and abdomen with IV contrast as well on 12/18/18. Patient received IVF and Scr. improved to 1.3 on 12/21/18. However Scr. had increased to 1.4 on 12/23 and is 1.33 today and hence nephrology was reconsulted.     Patient seen and examined. Denies c/o SOB, CP, abdominal pain, nausea, vomiting or diarrhea.       PAST HISTORY  --------------------------------------------------------------------------------  No significant changes to PMH, PSH, FHx, SHx, unless otherwise noted    ALLERGIES & MEDICATIONS  --------------------------------------------------------------------------------  Allergies    No Known Allergies    Intolerances      Standing Inpatient Medications  allopurinol 300 milliGRAM(s) Oral daily  dextrose 5%. 1000 milliLiter(s) IV Continuous <Continuous>  dextrose 50% Injectable 12.5 Gram(s) IV Push once  dextrose 50% Injectable 25 Gram(s) IV Push once  dextrose 50% Injectable 25 Gram(s) IV Push once  docusate sodium Liquid 100 milliGRAM(s) Oral two times a day  insulin lispro (HumaLOG) corrective regimen sliding scale   SubCutaneous every 6 hours  pantoprazole Infusion 8 mG/Hr IV Continuous <Continuous>  senna Syrup 10 milliLiter(s) Oral at bedtime    PRN Inpatient Medications  acetaminophen    Suspension .. 650 milliGRAM(s) Oral every 6 hours PRN  dextrose 40% Gel 15 Gram(s) Oral once PRN  diphenhydrAMINE   Injectable 25 milliGRAM(s) IV Push once PRN  glucagon  Injectable 1 milliGRAM(s) IntraMuscular once PRN  hydrocortisone sodium succinate Injectable 100 milliGRAM(s) IV Push once PRN  meperidine     Injectable 25 milliGRAM(s) IV Push once PRN  oxyCODONE    Solution 10 milliGRAM(s) Oral every 6 hours PRN  oxyCODONE    Solution 5 milliGRAM(s) Oral every 6 hours PRN      REVIEW OF SYSTEMS  --------------------------------------------------------------------------------    Gen: No  fevers/chills  Respiratory: No dyspnea  CV: No chest pain  GI: No abdominal pain, diarrhea,  nausea, vomiting  MSK:  no edema      All other systems were reviewed and are negative, except as noted.    VITALS/PHYSICAL EXAM  --------------------------------------------------------------------------------  T(C): 36.4 (12-24-18 @ 13:00), Max: 37 (12-23-18 @ 14:00)  HR: 74 (12-24-18 @ 13:00) (70 - 95)  BP: 142/74 (12-24-18 @ 13:00) (116/77 - 142/74)  RR: 18 (12-24-18 @ 13:00) (16 - 18)  SpO2: 99% (12-24-18 @ 13:00) (98% - 100%)  Wt(kg): --  Height (cm): 182.51 (12-24-18 @ 03:33)  Weight (kg): 97.5 (12-24-18 @ 03:33)  BMI (kg/m2): 29.3 (12-24-18 @ 03:33)  BSA (m2): 2.19 (12-24-18 @ 03:33)      12-23-18 @ 07:01  -  12-24-18 @ 07:00  --------------------------------------------------------  IN: 150 mL / OUT: 950 mL / NET: -800 mL    12-24-18 @ 07:01  -  12-24-18 @ 13:31  --------------------------------------------------------  IN: 0 mL / OUT: 650 mL / NET: -650 mL      Physical Exam:  	  Gen: NAD  	HEENT: sclera anicteric; tracheostomy tube present.   	Pulm: CTA B/L  	CV:  S1S2  	Abd: +BS, soft   	Ext: trace edema  	Neuro: awake and alert.   	Skin: Warm and dry     LABS/STUDIES  --------------------------------------------------------------------------------              8.0    6.48  >-----------<  263      [12-24-18 @ 06:50]              23.7     150  |  115  |  31  ----------------------------<  118      [12-24-18 @ 06:50]  3.9   |  24  |  1.33        Ca     9.5     [12-24-18 @ 06:50]      Mg     2.4     [12-24-18 @ 06:50]      Phos  3.2     [12-24-18 @ 06:50]    TPro  6.8  /  Alb  3.3  /  TBili  0.5  /  DBili  x   /  AST  16  /  ALT  10  /  AlkPhos  50  [12-24-18 @ 06:50]        Uric acid 5.7      [12-23-18 @ 07:00]        [12-23-18 @ 07:00]    Creatinine Trend:  SCr 1.33 [12-24 @ 06:50]  SCr 1.40 [12-23 @ 07:00]  SCr 1.40 [12-22 @ 05:40]  SCr 1.30 [12-21 @ 06:05]  SCr 1.44 [12-20 @ 10:00]    Urinalysis - [12-20-18 @ 20:24]      Color YELLOW / Appearance CLEAR / SG 1.021 / pH 6.0      Gluc NEGATIVE / Ketone NEGATIVE  / Bili NEGATIVE / Urobili TRACE       Blood NEGATIVE / Protein 10 / Leuk Est NEGATIVE / Nitrite NEGATIVE      RBC  / WBC  / Hyaline  / Gran  / Sq Epi  / Non Sq Epi  / Bacteria     Urine Creatinine 143.50      [12-20-18 @ 20:24]  Urine Protein 13.4      [12-20-18 @ 20:24]  Urine Sodium 45      [12-20-18 @ 20:24]  Urine Potassium 50.1      [12-20-18 @ 20:24]  Urine Chloride 27      [12-20-18 @ 20:24]

## 2018-12-25 LAB
ALBUMIN SERPL ELPH-MCNC: 3.3 G/DL — SIGNIFICANT CHANGE UP (ref 3.3–5)
ALP SERPL-CCNC: 55 U/L — SIGNIFICANT CHANGE UP (ref 40–120)
ALT FLD-CCNC: 16 U/L — SIGNIFICANT CHANGE UP (ref 4–41)
AST SERPL-CCNC: 19 U/L — SIGNIFICANT CHANGE UP (ref 4–40)
BILIRUB SERPL-MCNC: 0.3 MG/DL — SIGNIFICANT CHANGE UP (ref 0.2–1.2)
BUN SERPL-MCNC: 30 MG/DL — HIGH (ref 7–23)
CALCIUM SERPL-MCNC: 9 MG/DL — SIGNIFICANT CHANGE UP (ref 8.4–10.5)
CHLORIDE SERPL-SCNC: 111 MMOL/L — HIGH (ref 98–107)
CO2 SERPL-SCNC: 24 MMOL/L — SIGNIFICANT CHANGE UP (ref 22–31)
CREAT SERPL-MCNC: 1.23 MG/DL — SIGNIFICANT CHANGE UP (ref 0.5–1.3)
GLUCOSE SERPL-MCNC: 122 MG/DL — HIGH (ref 70–99)
HCT VFR BLD CALC: 25.5 % — LOW (ref 39–50)
HGB BLD-MCNC: 8.3 G/DL — LOW (ref 13–17)
MAGNESIUM SERPL-MCNC: 2.2 MG/DL — SIGNIFICANT CHANGE UP (ref 1.6–2.6)
MCHC RBC-ENTMCNC: 32.5 % — SIGNIFICANT CHANGE UP (ref 32–36)
MCHC RBC-ENTMCNC: 32.8 PG — SIGNIFICANT CHANGE UP (ref 27–34)
MCV RBC AUTO: 100.8 FL — HIGH (ref 80–100)
NRBC # FLD: 0 — SIGNIFICANT CHANGE UP
PHOSPHATE SERPL-MCNC: 3.4 MG/DL — SIGNIFICANT CHANGE UP (ref 2.5–4.5)
PLATELET # BLD AUTO: 282 K/UL — SIGNIFICANT CHANGE UP (ref 150–400)
PMV BLD: 11.1 FL — SIGNIFICANT CHANGE UP (ref 7–13)
POTASSIUM SERPL-MCNC: 3.8 MMOL/L — SIGNIFICANT CHANGE UP (ref 3.5–5.3)
POTASSIUM SERPL-SCNC: 3.8 MMOL/L — SIGNIFICANT CHANGE UP (ref 3.5–5.3)
PROT SERPL-MCNC: 6.6 G/DL — SIGNIFICANT CHANGE UP (ref 6–8.3)
RBC # BLD: 2.53 M/UL — LOW (ref 4.2–5.8)
RBC # FLD: 13.2 % — SIGNIFICANT CHANGE UP (ref 10.3–14.5)
SODIUM SERPL-SCNC: 146 MMOL/L — HIGH (ref 135–145)
WBC # BLD: 7.96 K/UL — SIGNIFICANT CHANGE UP (ref 3.8–10.5)
WBC # FLD AUTO: 7.96 K/UL — SIGNIFICANT CHANGE UP (ref 3.8–10.5)

## 2018-12-25 PROCEDURE — 99233 SBSQ HOSP IP/OBS HIGH 50: CPT

## 2018-12-25 PROCEDURE — 99232 SBSQ HOSP IP/OBS MODERATE 35: CPT | Mod: GC

## 2018-12-25 RX ADMIN — OXYCODONE HYDROCHLORIDE 10 MILLIGRAM(S): 5 TABLET ORAL at 06:12

## 2018-12-25 RX ADMIN — Medication 480 MICROGRAM(S): at 12:54

## 2018-12-25 RX ADMIN — Medication 100 MILLIGRAM(S): at 05:11

## 2018-12-25 RX ADMIN — OXYCODONE HYDROCHLORIDE 10 MILLIGRAM(S): 5 TABLET ORAL at 05:12

## 2018-12-25 RX ADMIN — OXYCODONE HYDROCHLORIDE 10 MILLIGRAM(S): 5 TABLET ORAL at 06:45

## 2018-12-25 RX ADMIN — Medication 300 MILLIGRAM(S): at 11:03

## 2018-12-25 RX ADMIN — SENNA PLUS 10 MILLILITER(S): 8.6 TABLET ORAL at 21:07

## 2018-12-25 RX ADMIN — PANTOPRAZOLE SODIUM 40 MILLIGRAM(S): 20 TABLET, DELAYED RELEASE ORAL at 12:53

## 2018-12-25 RX ADMIN — OXYCODONE HYDROCHLORIDE 10 MILLIGRAM(S): 5 TABLET ORAL at 22:07

## 2018-12-25 RX ADMIN — OXYCODONE HYDROCHLORIDE 10 MILLIGRAM(S): 5 TABLET ORAL at 21:07

## 2018-12-25 RX ADMIN — Medication 100 MILLIGRAM(S): at 18:44

## 2018-12-25 NOTE — PROGRESS NOTE ADULT - ASSESSMENT
67 yo male PMHx HTN, diet controlled DM type 2, asthma, recent tonsillar mass Bx in clinic, admitted for airway protection s/p trach and peg- was on ENT service- biopsy shows DLBCL- transferred to medicine for chemo s/p Rituxumab yesterday and continuing chemotherapy until 12/23 nwo c/b by acute blood loss anemia, suspected GI source, initially apprehensive for endoscopy, now amenable after son's visit today.

## 2018-12-25 NOTE — PROGRESS NOTE ADULT - PROBLEM SELECTOR PLAN 2
-apprecaite heme recs, etoposide/cyclophosphamide until 12/23.   -Heme consult appreciated continuign chemo until tomororw, but does not hold up discharge.    -outpatinet ofllow up.

## 2018-12-25 NOTE — PROGRESS NOTE ADULT - ASSESSMENT
Impression:  1) Melena with acute blood loss anemia- s/p EGD revealing 2 clean based gastric ulcers s/p biopsy  2) Respiratory failure sp trach and PEG  3) DLBCL, tonsillar mass    Recommendations:  -Resume tube feeds  -PPI PO daily  -Follow up path  -Monitor Hgb  - Rest of care per primary team

## 2018-12-25 NOTE — PROGRESS NOTE ADULT - SUBJECTIVE AND OBJECTIVE BOX
Patient is a 68y old  Male who presents with a chief complaint of respiratory failure (24 Dec 2018 13:31)      SUBJECTIVE / OVERNIGHT EVENTS:  s/p endoscopy yesterday. Patient states hungry, wants to eat PO. Swelling downtrending.     MEDICATIONS  (STANDING):  allopurinol 300 milliGRAM(s) Oral daily  dextrose 5%. 1000 milliLiter(s) (50 mL/Hr) IV Continuous <Continuous>  dextrose 50% Injectable 12.5 Gram(s) IV Push once  dextrose 50% Injectable 25 Gram(s) IV Push once  dextrose 50% Injectable 25 Gram(s) IV Push once  docusate sodium Liquid 100 milliGRAM(s) Oral two times a day  insulin lispro (HumaLOG) corrective regimen sliding scale   SubCutaneous every 6 hours  pantoprazole Infusion 8 mG/Hr (10 mL/Hr) IV Continuous <Continuous>  senna Syrup 10 milliLiter(s) Oral at bedtime    MEDICATIONS  (PRN):  acetaminophen    Suspension .. 650 milliGRAM(s) Oral every 6 hours PRN Mild Pain (1 - 3)  dextrose 40% Gel 15 Gram(s) Oral once PRN Blood Glucose LESS THAN 70 milliGRAM(s)/deciliter  diphenhydrAMINE   Injectable 25 milliGRAM(s) IV Push once PRN REACTION  glucagon  Injectable 1 milliGRAM(s) IntraMuscular once PRN Glucose LESS THAN 70 milligrams/deciliter  hydrocortisone sodium succinate Injectable 100 milliGRAM(s) IV Push once PRN REACTION  meperidine     Injectable 25 milliGRAM(s) IV Push once PRN RIGORS  oxyCODONE    Solution 10 milliGRAM(s) Oral every 6 hours PRN Severe Pain (7 - 10)  oxyCODONE    Solution 5 milliGRAM(s) Oral every 6 hours PRN Moderate Pain (4 - 6)    Vital Signs Last 24 Hrs  T(C): 36.8 (25 Dec 2018 14:33), Max: 37 (24 Dec 2018 23:32)  T(F): 98.3 (25 Dec 2018 14:33), Max: 98.6 (24 Dec 2018 23:32)  HR: 97 (25 Dec 2018 14:33) (75 - 97)  BP: 113/68 (25 Dec 2018 14:33) (102/68 - 121/83)  BP(mean): --  RR: 14 (25 Dec 2018 14:33) (14 - 18)  SpO2: 98% (25 Dec 2018 14:33) (95% - 100%)    I&O's Summary    24 Dec 2018 07:01  -  25 Dec 2018 07:00  --------------------------------------------------------  IN: 1400 mL / OUT: 2350 mL / NET: -950 mL    25 Dec 2018 07:01  -  25 Dec 2018 14:54  --------------------------------------------------------  IN: 500 mL / OUT: 450 mL / NET: 50 mL        GENERAL: NAD, well-developed  HEAD:  Atraumatic, Normocephalic  EYES: EOMI, PERRLA, conjunctiva and sclera clear  NECK: trach, Cervical lymphadenopathy  CHEST/LUNG: Clear to auscultation bilaterally; No wheeze, some mild b/l rhonchi  HEART: Regular rate and rhythm; No murmurs, rubs, or gallops  ABDOMEN: Soft, Nontender, Nondistended; Bowel sounds present. +PEG site c/d/i.  EXTREMITIES:  2+ Peripheral Pulses, No clubbing, cyanosis, or edema  PSYCH: AAOx3  NEUROLOGY: CN II-XII grossly intact, moving all extremities  SKIN: No rashes or lesions    LABS:  CAPILLARY BLOOD GLUCOSE    I&O's Summary    24 Dec 2018 07:01  -  25 Dec 2018 07:00  --------------------------------------------------------  IN: 1400 mL / OUT: 2350 mL / NET: -950 mL    25 Dec 2018 07:01  -  25 Dec 2018 14:55  --------------------------------------------------------  IN: 500 mL / OUT: 450 mL / NET: 50 mL                                         8.3    7.96  )-----------( 282      ( 25 Dec 2018 06:35 )             25.5   12-25    146<H>  |  111<H>  |  30<H>  ----------------------------<  122<H>  3.8   |  24  |  1.23    Ca    9.0      25 Dec 2018 06:35  Phos  3.4     12-25  Mg     2.2     12-25    TPro  6.6  /  Alb  3.3  /  TBili  0.3  /  DBili  x   /  AST  19  /  ALT  16  /  AlkPhos  55  12-25          RADIOLOGY & ADDITIONAL TESTS:    Imaging Personally Reviewed:    Consultant(s) Notes Reviewed:      Care Discussed with Consultants/Other Providers:

## 2018-12-25 NOTE — PROGRESS NOTE ADULT - SUBJECTIVE AND OBJECTIVE BOX
Chief Complaint:  Patient is a 68y old  Male who presents with a chief complaint of respiratory failure (24 Dec 2018 15:22)      Interval Events: Hgb 8.3 today. Pt feels well. S/p EGD yesterday showing 2 clean based ulcers, biopsy taken.   ROS: All 12 point system except listed above were otherwise negative.    Allergies:  No Known Allergies        Hospital Medications:  acetaminophen    Suspension .. 650 milliGRAM(s) Oral every 6 hours PRN  allopurinol 300 milliGRAM(s) Oral daily  dextrose 40% Gel 15 Gram(s) Oral once PRN  dextrose 5%. 1000 milliLiter(s) IV Continuous <Continuous>  dextrose 50% Injectable 12.5 Gram(s) IV Push once  dextrose 50% Injectable 25 Gram(s) IV Push once  dextrose 50% Injectable 25 Gram(s) IV Push once  diphenhydrAMINE   Injectable 25 milliGRAM(s) IV Push once PRN  docusate sodium Liquid 100 milliGRAM(s) Oral two times a day  filgrastim-sndz Injectable 480 MICROGram(s) SubCutaneous daily  glucagon  Injectable 1 milliGRAM(s) IntraMuscular once PRN  hydrocortisone sodium succinate Injectable 100 milliGRAM(s) IV Push once PRN  insulin lispro (HumaLOG) corrective regimen sliding scale   SubCutaneous every 6 hours  meperidine     Injectable 25 milliGRAM(s) IV Push once PRN  oxyCODONE    Solution 10 milliGRAM(s) Oral every 6 hours PRN  oxyCODONE    Solution 5 milliGRAM(s) Oral every 6 hours PRN  pantoprazole   Suspension 40 milliGRAM(s) Oral daily  senna Syrup 10 milliLiter(s) Oral at bedtime      PMHX/PSHX:  Sleep apnea  Asthma  Localized swelling, mass and lump, head  Tonsil cancer  DM (diabetes mellitus)  HTN (hypertension)  History of surgery  Forearm fracture      Family history:  No pertinent family history in first degree relatives    There is no family history of peptic ulcer disease, gastric cancer, colon polyps, colon cancer, celiac disease, biliary, hepatic, or pancreatic disease.  None of the female relatives have breast, uterine, or ovarian cancer.     PHYSICAL EXAM:   Vital Signs:  Vital Signs Last 24 Hrs  T(C): 36.4 (25 Dec 2018 05:01), Max: 37 (24 Dec 2018 23:32)  T(F): 97.5 (25 Dec 2018 05:01), Max: 98.6 (24 Dec 2018 23:32)  HR: 80 (25 Dec 2018 05:01) (70 - 97)  BP: 121/83 (25 Dec 2018 05:01) (102/68 - 142/74)  BP(mean): --  RR: 18 (25 Dec 2018 05:01) (16 - 18)  SpO2: 99% (25 Dec 2018 05:01) (95% - 100%)  Daily     Daily     PHYSICAL EXAM:     GENERAL:  Appears stated age, well-groomed, well-nourished  HEENT:  NC/AT, anicteric, +trach  CHEST:  Full & symmetric excursion, no increased effort, breath sounds clear  HEART:  Regular rhythm, S1, S2  ABDOMEN: Refused abdominal exam, PEG site dressed in bandage  EXTREMITIES:  no cyanosis, clubbing or edema  SKIN:  No rash/erythema/ecchymoses/petechiae/wounds/abscess/warm/dry  NEURO: No focal deficits  Psych: Alert and oriented but poor insight    LABS:                        8.3    7.96  )-----------( 282      ( 25 Dec 2018 06:35 )             25.5     Mean Cell Volume: 100.8 fL (12-25-18 @ 06:35)    12-25    146<H>  |  111<H>  |  30<H>  ----------------------------<  122<H>  3.8   |  24  |  1.23    Ca    9.0      25 Dec 2018 06:35  Phos  3.4     12-25  Mg     2.2     12-25    TPro  6.6  /  Alb  3.3  /  TBili  0.3  /  DBili  x   /  AST  19  /  ALT  16  /  AlkPhos  55  12-25    LIVER FUNCTIONS - ( 25 Dec 2018 06:35 )  Alb: 3.3 g/dL / Pro: 6.6 g/dL / ALK PHOS: 55 u/L / ALT: 16 u/L / AST: 19 u/L / GGT: x                                       8.3    7.96  )-----------( 282      ( 25 Dec 2018 06:35 )             25.5                         8.0    6.48  )-----------( 263      ( 24 Dec 2018 06:50 )             23.7                         8.0    7.85  )-----------( 287      ( 23 Dec 2018 18:20 )             23.7                         7.5    6.92  )-----------( 261      ( 23 Dec 2018 07:00 )             21.8                         7.6    6.97  )-----------( 293      ( 23 Dec 2018 01:20 )             22.5     Imaging:  < from: Upper Endoscopy (12.24.18 @ 14:39) >  Findings:       The examined esophagus was normal.       The Z-line was regular and was found 39 cm from the incisors.       There was evidence of an intact gastrostomy with a patent G-tube present        on the greater curvature of the gastric body. This was characterized by        healthy appearing mucosa. Bumper was lifted from gastric mucosa with no        underlying ulceration found.       One non-bleeding cratered gastric ulcer with a clean ulcer base (Vipul        Class III) with overlying eschar was found on the anterior wall/lesser        curve of the gastric body. The lesion was 10 mm in largest dimension.        Biopsies were taken with a cold forceps for histology. Verification of        patient identification for the specimen was done. Estimated blood loss        was minimal.       One non-bleeding cratered gastric ulcer with a clean ulcer base (Vipul        Class III) with overlying eschar was found on the greater curvature of        the gastric body close to the gastric fundus. The lesion was 5 mm in        largest dimension.       Localized moderate inflammation characterized by shallow erosions was        found in the entire examined stomach.       Biopsies were taken with a cold forceps on the greater curvature of the        gastric antrum and on the lesser curvature of the gastric antrum for        Helicobacter pylori testing. Verification of patient identification for        the specimen was done. Estimated blood loss was minimal.       The examined duodenum was normal to the 2nd portion.                                                                    Impression:          - Normal esophagus.                       - Z-line regular, 39 cm from the incisors.                       - Intact gastrostomy with a patent G-tube present                   characterized by healthy appearing mucosa.                       - Non-bleeding gastric ulcer with a clean ulcer base                        (Vipul Class III). Biopsied.                       - Non-bleeding gastric ulcer with a clean ulcer base                        (Vipul Class III).                       - Gastritis.                       - Normal examined duodenum.                       - Biopsies were taken with a cold forceps for                        Helicobacter pylori testing.  Recommendation:      - Return patient to hospital [facility] for ongoing care.                       - Resume tube feeds.                       - Use a proton pump inhibitor PO daily.                       - Await pathology results.           - Monitor hemoglobin and bowel movements    < end of copied text >

## 2018-12-26 LAB
ALBUMIN SERPL ELPH-MCNC: 3.5 G/DL — SIGNIFICANT CHANGE UP (ref 3.3–5)
ALP SERPL-CCNC: 62 U/L — SIGNIFICANT CHANGE UP (ref 40–120)
ALT FLD-CCNC: 14 U/L — SIGNIFICANT CHANGE UP (ref 4–41)
AST SERPL-CCNC: 15 U/L — SIGNIFICANT CHANGE UP (ref 4–40)
BASOPHILS # BLD AUTO: 0.08 K/UL — SIGNIFICANT CHANGE UP (ref 0–0.2)
BASOPHILS NFR BLD AUTO: 0.3 % — SIGNIFICANT CHANGE UP (ref 0–2)
BASOPHILS NFR SPEC: 0 % — SIGNIFICANT CHANGE UP (ref 0–2)
BILIRUB SERPL-MCNC: 0.5 MG/DL — SIGNIFICANT CHANGE UP (ref 0.2–1.2)
BUN SERPL-MCNC: 24 MG/DL — HIGH (ref 7–23)
CALCIUM SERPL-MCNC: 9.1 MG/DL — SIGNIFICANT CHANGE UP (ref 8.4–10.5)
CHLORIDE SERPL-SCNC: 105 MMOL/L — SIGNIFICANT CHANGE UP (ref 98–107)
CO2 SERPL-SCNC: 24 MMOL/L — SIGNIFICANT CHANGE UP (ref 22–31)
CREAT SERPL-MCNC: 1.11 MG/DL — SIGNIFICANT CHANGE UP (ref 0.5–1.3)
EOSINOPHIL # BLD AUTO: 0.18 K/UL — SIGNIFICANT CHANGE UP (ref 0–0.5)
EOSINOPHIL NFR BLD AUTO: 0.7 % — SIGNIFICANT CHANGE UP (ref 0–6)
EOSINOPHIL NFR FLD: 0 % — SIGNIFICANT CHANGE UP (ref 0–6)
GLUCOSE SERPL-MCNC: 90 MG/DL — SIGNIFICANT CHANGE UP (ref 70–99)
HCT VFR BLD CALC: 26.1 % — LOW (ref 39–50)
HCT VFR BLD CALC: 27.3 % — LOW (ref 39–50)
HGB BLD-MCNC: 8.9 G/DL — LOW (ref 13–17)
HGB BLD-MCNC: 9.3 G/DL — LOW (ref 13–17)
IMM GRANULOCYTES # BLD AUTO: 1.14 # — SIGNIFICANT CHANGE UP
IMM GRANULOCYTES NFR BLD AUTO: 4.2 % — HIGH (ref 0–1.5)
LYMPHOCYTES # BLD AUTO: 1.53 K/UL — SIGNIFICANT CHANGE UP (ref 1–3.3)
LYMPHOCYTES # BLD AUTO: 5.6 % — LOW (ref 13–44)
LYMPHOCYTES NFR SPEC AUTO: 4 % — LOW (ref 13–44)
MACROCYTES BLD QL: SIGNIFICANT CHANGE UP
MAGNESIUM SERPL-MCNC: 2.1 MG/DL — SIGNIFICANT CHANGE UP (ref 1.6–2.6)
MANUAL SMEAR VERIFICATION: SIGNIFICANT CHANGE UP
MCHC RBC-ENTMCNC: 33.7 PG — SIGNIFICANT CHANGE UP (ref 27–34)
MCHC RBC-ENTMCNC: 33.7 PG — SIGNIFICANT CHANGE UP (ref 27–34)
MCHC RBC-ENTMCNC: 34.1 % — SIGNIFICANT CHANGE UP (ref 32–36)
MCHC RBC-ENTMCNC: 34.1 % — SIGNIFICANT CHANGE UP (ref 32–36)
MCV RBC AUTO: 98.9 FL — SIGNIFICANT CHANGE UP (ref 80–100)
MCV RBC AUTO: 98.9 FL — SIGNIFICANT CHANGE UP (ref 80–100)
MONOCYTES # BLD AUTO: 0.07 K/UL — SIGNIFICANT CHANGE UP (ref 0–0.9)
MONOCYTES NFR BLD AUTO: 0.3 % — LOW (ref 2–14)
MONOCYTES NFR BLD: 1 % — LOW (ref 2–9)
MORPHOLOGY BLD-IMP: SIGNIFICANT CHANGE UP
NEUTROPHIL AB SER-ACNC: 95 % — HIGH (ref 43–77)
NEUTROPHILS # BLD AUTO: 24.16 K/UL — HIGH (ref 1.8–7.4)
NEUTROPHILS NFR BLD AUTO: 88.9 % — HIGH (ref 43–77)
NRBC # BLD: 0 /100WBC — SIGNIFICANT CHANGE UP
NRBC # FLD: 0 — SIGNIFICANT CHANGE UP
NRBC # FLD: 0 — SIGNIFICANT CHANGE UP
PHOSPHATE SERPL-MCNC: 3.5 MG/DL — SIGNIFICANT CHANGE UP (ref 2.5–4.5)
PLATELET # BLD AUTO: 135 K/UL — LOW (ref 150–400)
PLATELET # BLD AUTO: 267 K/UL — SIGNIFICANT CHANGE UP (ref 150–400)
PLATELET CLUMP BLD QL SMEAR: SLIGHT — SIGNIFICANT CHANGE UP
PLATELET COUNT - ESTIMATE: NORMAL — SIGNIFICANT CHANGE UP
PMV BLD: 10.7 FL — SIGNIFICANT CHANGE UP (ref 7–13)
PMV BLD: 11.4 FL — SIGNIFICANT CHANGE UP (ref 7–13)
POTASSIUM SERPL-MCNC: 4.3 MMOL/L — SIGNIFICANT CHANGE UP (ref 3.5–5.3)
POTASSIUM SERPL-SCNC: 4.3 MMOL/L — SIGNIFICANT CHANGE UP (ref 3.5–5.3)
PROT SERPL-MCNC: 7 G/DL — SIGNIFICANT CHANGE UP (ref 6–8.3)
RBC # BLD: 2.64 M/UL — LOW (ref 4.2–5.8)
RBC # BLD: 2.76 M/UL — LOW (ref 4.2–5.8)
RBC # FLD: 12.7 % — SIGNIFICANT CHANGE UP (ref 10.3–14.5)
RBC # FLD: 12.9 % — SIGNIFICANT CHANGE UP (ref 10.3–14.5)
REVIEW TO FOLLOW: YES — SIGNIFICANT CHANGE UP
SODIUM SERPL-SCNC: 140 MMOL/L — SIGNIFICANT CHANGE UP (ref 135–145)
WBC # BLD: 27.16 K/UL — HIGH (ref 3.8–10.5)
WBC # BLD: 31.44 K/UL — HIGH (ref 3.8–10.5)
WBC # FLD AUTO: 27.16 K/UL — HIGH (ref 3.8–10.5)
WBC # FLD AUTO: 31.44 K/UL — HIGH (ref 3.8–10.5)

## 2018-12-26 PROCEDURE — 99232 SBSQ HOSP IP/OBS MODERATE 35: CPT | Mod: GC

## 2018-12-26 PROCEDURE — 99233 SBSQ HOSP IP/OBS HIGH 50: CPT

## 2018-12-26 RX ADMIN — Medication 300 MILLIGRAM(S): at 11:29

## 2018-12-26 RX ADMIN — OXYCODONE HYDROCHLORIDE 10 MILLIGRAM(S): 5 TABLET ORAL at 22:17

## 2018-12-26 RX ADMIN — PANTOPRAZOLE SODIUM 40 MILLIGRAM(S): 20 TABLET, DELAYED RELEASE ORAL at 11:29

## 2018-12-26 RX ADMIN — OXYCODONE HYDROCHLORIDE 10 MILLIGRAM(S): 5 TABLET ORAL at 23:00

## 2018-12-26 RX ADMIN — Medication 100 MILLIGRAM(S): at 06:32

## 2018-12-26 RX ADMIN — SENNA PLUS 10 MILLILITER(S): 8.6 TABLET ORAL at 22:17

## 2018-12-26 RX ADMIN — Medication 480 MICROGRAM(S): at 11:29

## 2018-12-26 RX ADMIN — Medication 100 MILLIGRAM(S): at 17:41

## 2018-12-26 NOTE — SWALLOW BEDSIDE ASSESSMENT ADULT - COMMENTS
Patient is a 68 year old male with L tonsillar mass who presented to the ED with SOB with inability to tolerate his secretions s/p DLB and emergent tracheotomy, doing well post-op.      Patient was received awake, alert and cooperative for a re-evaluation bedside swallow evaluation utilizing green dye screening this afternoon. Patient with PEG tube in place.  #6 LPC trach with deflated cuff status in place. Patient breathing comfortably on trach collar.  Patient reports he has not used the PMSV since being given on 12/20/2018, however was able to tolerate wearing it during this evaluation.   Of note, patient exhibited open mouth posture at rest with appearance of large tongue in forward, flaccid position. RN, Ghanshyam, was present during this evaluation and assisted in suctioning after all trials.  Recommendations discussed with primary RN and MD team.  Patient's swelling of tongue and cheeks has significantly reduced and patient's speech is intelligible at baseline.  Patient also noted to produce voicing without PMSV in place.

## 2018-12-26 NOTE — PROGRESS NOTE ADULT - ASSESSMENT
69 yo male PMHx HTN, diet controlled DM type 2, asthma, recent tonsillar mass Bx in clinic, admitted for airway protection s/p trach and peg- was on ENT service- biopsy shows DLBCL- transferred to medicine for chemo s/p Rituxumab yesterday and continuing chemotherapy until 12/23 nwo c/b by acute blood loss anemia, suspected GI source, initially apprehensive for endoscopy, agreed for endoscopy after son's visit.

## 2018-12-26 NOTE — SWALLOW BEDSIDE ASSESSMENT ADULT - SWALLOW EVAL: DIAGNOSIS
Patient was presented with PO trials of puree, mechanical soft solids, thin liquids, nectar thickened liquids, and honey-thick liquids impregnated in green dye this PM via teaspoon and cup sips.  The oral stage was functional with adequate bolus formation, transfer, and clearance.  The pharyngeal stage was characterized by a suspected delay in pharyngeal trigger, mildly reduced hyolaryngeal elevation upon palpation and no overt s/s of penetration/aspiration.  RN, Ghanshyam, provided intratracheal suctioning post each consistency trial, which yielded no evidence of green tinged secretions.  Given above clinical presentation, MD to consider initiation of dysphagia 1 and Honey thickened liquids, with objective swallow study (cine-esophagram) to r/o silent penetration/aspiration.

## 2018-12-26 NOTE — PROGRESS NOTE ADULT - SUBJECTIVE AND OBJECTIVE BOX
Patient is a 68y old  Male who presents with a chief complaint of respiratory failure (26 Dec 2018 13:37)      SUBJECTIVE / OVERNIGHT EVENTS:  Patient seen and examined this morning. No overnight events. Denies any chest pain, shortness of breath, fevers, chills, nausea or vomiting. Feels well and would like to be discharged.    MEDICATIONS  (STANDING):  allopurinol 300 milliGRAM(s) Oral daily  dextrose 5%. 1000 milliLiter(s) (50 mL/Hr) IV Continuous <Continuous>  dextrose 50% Injectable 12.5 Gram(s) IV Push once  dextrose 50% Injectable 25 Gram(s) IV Push once  dextrose 50% Injectable 25 Gram(s) IV Push once  docusate sodium Liquid 100 milliGRAM(s) Oral two times a day  filgrastim-sndz Injectable 480 MICROGram(s) SubCutaneous daily  insulin lispro (HumaLOG) corrective regimen sliding scale   SubCutaneous every 6 hours  pantoprazole   Suspension 40 milliGRAM(s) Oral daily  senna Syrup 10 milliLiter(s) Oral at bedtime    MEDICATIONS  (PRN):  acetaminophen    Suspension .. 650 milliGRAM(s) Oral every 6 hours PRN Mild Pain (1 - 3)  dextrose 40% Gel 15 Gram(s) Oral once PRN Blood Glucose LESS THAN 70 milliGRAM(s)/deciliter  diphenhydrAMINE   Injectable 25 milliGRAM(s) IV Push once PRN REACTION  glucagon  Injectable 1 milliGRAM(s) IntraMuscular once PRN Glucose LESS THAN 70 milligrams/deciliter  hydrocortisone sodium succinate Injectable 100 milliGRAM(s) IV Push once PRN REACTION  meperidine     Injectable 25 milliGRAM(s) IV Push once PRN RIGORS  oxyCODONE    Solution 10 milliGRAM(s) Oral every 6 hours PRN Severe Pain (7 - 10)  oxyCODONE    Solution 5 milliGRAM(s) Oral every 6 hours PRN Moderate Pain (4 - 6)      PHYSICAL EXAM:  T(C): 37.1 (12-26-18 @ 10:45), Max: 37.2 (12-26-18 @ 02:43)  HR: 83 (12-26-18 @ 10:45) (71 - 92)  BP: 107/79 (12-26-18 @ 10:45) (102/70 - 120/61)  RR: 18 (12-26-18 @ 10:45) (17 - 18)  SpO2: 98% (12-26-18 @ 10:45) (98% - 100%)  I&O's Summary    25 Dec 2018 07:01  -  26 Dec 2018 07:00  --------------------------------------------------------  IN: 2600 mL / OUT: 1950 mL / NET: 650 mL    26 Dec 2018 07:01  -  26 Dec 2018 15:15  --------------------------------------------------------  IN: 620 mL / OUT: 650 mL / NET: -30 mL  GENERAL: NAD, well-developed  HEAD:  Atraumatic, Normocephalic  EYES: EOMI, PERRLA, conjunctiva and sclera clear  NECK: trach, Cervical lymphadenopathy  CHEST/LUNG: Clear to auscultation bilaterally; No wheeze, some mild b/l rhonchi  HEART: Regular rate and rhythm; No murmurs, rubs, or gallops  ABDOMEN: Soft, Nontender, Nondistended; Bowel sounds present  EXTREMITIES:  2+ Peripheral Pulses, No clubbing, cyanosis, or edema  PSYCH: AAOx3  NEUROLOGY: CN II-XII grossly intact, moving all extremities  SKIN: No rashes or lesions          LABS:  CAPILLARY BLOOD GLUCOSE      POCT Blood Glucose.: 109 mg/dL (26 Dec 2018 11:32)  POCT Blood Glucose.: 111 mg/dL (26 Dec 2018 05:45)  POCT Blood Glucose.: 108 mg/dL (25 Dec 2018 23:24)  POCT Blood Glucose.: 123 mg/dL (25 Dec 2018 18:46)                          9.3    27.16 )-----------( 135      ( 26 Dec 2018 10:45 )             27.3     12-26    140  |  105  |  24<H>  ----------------------------<  90  4.3   |  24  |  1.11    Ca    9.1      26 Dec 2018 05:45  Phos  3.5     12-26  Mg     2.1     12-26    TPro  7.0  /  Alb  3.5  /  TBili  0.5  /  DBili  x   /  AST  15  /  ALT  14  /  AlkPhos  62  12-26              RADIOLOGY & ADDITIONAL TESTS:    Imaging Personally Reviewed:    Consultant(s) Notes Reviewed:      Care Discussed with Consultants/Other Providers:   Patient discussed with hematology fellow, Dr. Goldberg

## 2018-12-26 NOTE — PROGRESS NOTE ADULT - PROBLEM SELECTOR PLAN 1
Patient with hemodynamically mediated JAVIER in setting of IV contrast, ARB use and low BP. Latest Scr. is now WNL(1.1) today. Patient is non oliguric. Currently off IV fluids and getting feeds via PEG tube. Monitor BMP daily. Avoid NSAIDs, RCAs, and other nephrotoxins. Will sign off for now; please recall if needed.

## 2018-12-26 NOTE — PROGRESS NOTE ADULT - SUBJECTIVE AND OBJECTIVE BOX
INTERVAL HPI/OVERNIGHT EVENTS:  Patient S&E at bedside. No o/n events, patient has not had a bowel movement yet, but just restarted PEG feeds late yesterday. He reports he has been taking some sips of apple juice and it is going well.     VITAL SIGNS:  T(F): 98.7 (12-26-18 @ 10:45)  HR: 83 (12-26-18 @ 10:45)  BP: 107/79 (12-26-18 @ 10:45)  RR: 18 (12-26-18 @ 10:45)  SpO2: 98% (12-26-18 @ 10:45)  Wt(kg): --    PHYSICAL EXAM:    Constitutional: NAD  Eyes: EOMI, sclera non-icteric  Neck: trach collar in place.   Oral: Much improved tongue swelling, speech improved.   Respiratory: CTA b/l, good air entry b/l  Cardiovascular: RRR, no M/R/G  Gastrointestinal: soft, NTND, no masses palpable, + BS, no hepatosplenomegaly  Extremities: no c/c/e  Neurological: AAOx3      MEDICATIONS  (STANDING):  allopurinol 300 milliGRAM(s) Oral daily  dextrose 5%. 1000 milliLiter(s) (50 mL/Hr) IV Continuous <Continuous>  dextrose 50% Injectable 12.5 Gram(s) IV Push once  dextrose 50% Injectable 25 Gram(s) IV Push once  dextrose 50% Injectable 25 Gram(s) IV Push once  docusate sodium Liquid 100 milliGRAM(s) Oral two times a day  filgrastim-sndz Injectable 480 MICROGram(s) SubCutaneous daily  insulin lispro (HumaLOG) corrective regimen sliding scale   SubCutaneous every 6 hours  pantoprazole   Suspension 40 milliGRAM(s) Oral daily  senna Syrup 10 milliLiter(s) Oral at bedtime    MEDICATIONS  (PRN):  acetaminophen    Suspension .. 650 milliGRAM(s) Oral every 6 hours PRN Mild Pain (1 - 3)  dextrose 40% Gel 15 Gram(s) Oral once PRN Blood Glucose LESS THAN 70 milliGRAM(s)/deciliter  diphenhydrAMINE   Injectable 25 milliGRAM(s) IV Push once PRN REACTION  glucagon  Injectable 1 milliGRAM(s) IntraMuscular once PRN Glucose LESS THAN 70 milligrams/deciliter  hydrocortisone sodium succinate Injectable 100 milliGRAM(s) IV Push once PRN REACTION  meperidine     Injectable 25 milliGRAM(s) IV Push once PRN RIGORS  oxyCODONE    Solution 10 milliGRAM(s) Oral every 6 hours PRN Severe Pain (7 - 10)  oxyCODONE    Solution 5 milliGRAM(s) Oral every 6 hours PRN Moderate Pain (4 - 6)      Allergies    No Known Allergies    Intolerances        LABS:                        8.9    31.44 )-----------( 267      ( 26 Dec 2018 05:45 )             26.1     12-26    140  |  105  |  24<H>  ----------------------------<  90  4.3   |  24  |  1.11    Ca    9.1      26 Dec 2018 05:45  Phos  3.5     12-26  Mg     2.1     12-26    TPro  7.0  /  Alb  3.5  /  TBili  0.5  /  DBili  x   /  AST  15  /  ALT  14  /  AlkPhos  62  12-26      RADIOLOGY & ADDITIONAL TESTS:  Studies reviewed.    < from: US Kidney and Bladder (12.21.18 @ 10:04) >    IMPRESSION:     *  Left lower pole renal cyst measuring up to 1.5 cm.  *  Otherwise normal renal ultrasound.    < end of copied text >

## 2018-12-26 NOTE — PROGRESS NOTE ADULT - ASSESSMENT
Impression:  1)Melena- secondary to gastric ulcers (Vipul Class 3), no further episodes of overt GI bleeding with stable hemoglobin  2)Leukocytosis- patient asymptomatic currently, unclear etiology for rise in WBC in 24 hours    Recommendations:  -c/w PPI PO daily (via PEG tube)  -monitor hemoglobin  -tube feeds/diet as tolerated  -f/u pathology from EGD     Please call with questions  Jennifer Ahn  GI Fellow  Pager: [phone]

## 2018-12-26 NOTE — PROGRESS NOTE ADULT - ASSESSMENT
68M w/ h/o asthma, htn, DM (diet controlled) and recently diagnosed left sided tonsillar mass s/p non-diagnostic tonsil biopsy in the office 11/13/18 who presented to the ED with SOB s/p tracheostomy and pathology confirming DLBCL, GCB subtype    1. DLBCL  - core LN biopsy consistent with DLBCL GCB subtype, FISH negative for BCL6, MYC, MYC/IGH and IGH/BLC2 translocation  - also had an excisional LN bx on 12/15  - CT C/A/P with indeterminate adrenal nodules only   - hep panel negative, TTE with EF of 35%  - s/p BM biopsy on 12/19/18; pending results  - Treatment plan is R-CEOP regimen given low EF, now s/p 1st cycle  - Day 1 (12/20): s/p Rituximab and Prednisone 100 mg daily x 5 days  - Day 2 (12/21): s/p Cyclophosphamide and Vincristine  - Day 2 - Day 4: s/p Etoposide (ended on 12/23)  - started filgrastim 24 hours after completion of chemotherapy (dose of 480 mcg SQ) to continue daily x 3 days.   - continue allopurinol  - please check tumor lysis labs (CMP with Phos, LDH, uric acid) daily  - From hematologic standpoint patient is cleared for discharge.  Follow up appointment with Dr. Felipe Rock on Friday 12/28 at 3PM, if patient to remain inpatient any longer will then need to reschedule.     2. Anemia 2/2 UGIB  - drop in hemoglobin s/p EGD which showed non-bleeding ulcer (resolved bleed)  - pt with no further melanotic stools however no bowel movements since PEG feeds restarted yesterday.    - trend CBC  - c/w PPI  - GI input appreciated.     Bradley Goldberg M.D. - PGY-6  Hematology/Oncology Fellow  Pager: [phone] (NS)   [phone] ([facility])  Weekends and after 5PM please contact on call fellow.

## 2018-12-26 NOTE — SWALLOW BEDSIDE ASSESSMENT ADULT - ASR SWALLOW ASPIRATION MONITOR
Progress Notes by Eladia Skinner MD at 09/26/18 12:14 AM     Author:  Eladia Skinner MD Service:  (none) Author Type:  Physician     Filed:  09/26/18 09:43 PM Encounter Date:  9/26/2018 Status:  Signed     :  Eladia Skinner MD (Physician)            CC:[JN1.1T] low back pain[JN1.1M]  SUBJECTIVE  HISTORY OF PRESENT ILLNESS:  Luz Dye is a 76 year old female who presents today as a New patient/Consultation[JN1.1T] in referral from Dr. Diego[JN1.1M] with complaint of[JN1.1T] worsening chronic low back pain[JN1.1M] with radiation[JN1.2M] periodically down into LEFT buttocks, hip and groin[JN1.3M] fo[JN1.2M]r the past 3months.[JN1.3M]   Denies any fall, injury or inciting event.[JN1.1M]  H/O chronic axial low back pain x 3 yrs.   NO sxs in RLE.[JN1.3M]     Denies any urinary sxs, fever, unintentional weight loss.   Denies any B/B changes or incontinence.   Denies any numbness/tingling symptoms in extremities  Denies any saddle anesthesia.   Denies any extremity weakness.[JN1.1T]   Pain interrupts sleep.[JN1.3M]     PRESENTLY TAKING FOR PAIN[JN1.1M]   TYLENOL ARTHRITIS 650MG 2 TAB PO BID - HELPS[JN1.3M]  TRAMADOL 50MG 1 TAB PO TID[JN1.1M] - NOT VERY HELPFUL, RELAYS TYLENOL WORKS BETER    TRIED IN THE PAST  IBUPROFEN 800MG - HELPED BUT RAN OUT  OLD SUPPLY OF NORCO (AFTER TKA)  - HELPS. LAST TOOK IN 6/2018[JN1.3M]      Inciting event:[JN1.1T] unknown[AV1.1M]  Duration of symptoms:[JN1.1T] 3 years[AV1.1M]  Location of symptoms:[JN1.1T] low back[AV1.1M]  Radiation of pain:[JN1.1T] left[AV1.1M] buttocks, hip, groin[JN1.2M]  Numbness,tingling:[JN1.1T] no[AV1.1M]  Weakness:[JN1.1T] no[AV1.1M]  Bowel, bladder changes:[JN1.1T] no[AV1.1M]  Balance changes:[JN1.1T] no[AV1.1M]  Description of pain:[JN1.1T] dull[AV1.1M]  Alleviating factors:[JN1.1T] arthritis tylenol[AV1.1M]  Worsening factors:[JN1.1T] n/a[AV1.1M]  Current treatment:[JN1.1T] Tylenol prn T[JN1.3M]ramadol[AV1.1M] prn[JN1.3M]  Previous 
treatments:[JN1.1T] Home exercise program and swim[AV1.1M]  Previous evaluation:[JN1.1T] no one[AV1.1M]  Blood thinners:[JN1.1T] no[AV1.1M]  Dye allergy:[JN1.1T] no[AV1.1M]  Pain score:[JN1.1T] 7/10[AV1.1M].[JN1.3M]  CURRENTLY[JN1.2M] 0/10.[JN1.3M]     Past Medical History:     Diagnosis  Date   • Abnormal Papanicolaou smear of cervix and cervical HPV         • Essential hypertension, benign    • IFG (impaired fasting glucose)    • Osteoarthrosis, unspecified whether generalized or localized, lower leg     back pain, uses norco rarely    • Spinal stenosis    • Thrombocytosis     sees Dr. Corrales    • Unspecified hypothyroidism      Past Surgical History:      Procedure  Laterality Date   • REMOVAL GALLBLADDER     • THYROIDECTOMY      for a \"goiter\"     • TONSILLECTOMY     • TOTAL KNEE REPLACEMENT      bilateral[AV1.2T]       SOCIAL HISTORY  Work/Activites:[JN1.1T] retired[JN1.1M]  SON LIVES WITH HER[JN1.3M]  Social History     Substance Use Topics     • Smoking status: Never Smoker   • Smokeless tobacco: Never Used   • Alcohol use No[AV1.2T]      ILLICIT DRUG USE:[JN1.1T] denies[AV1.1M]    FAMILY HISTORY[JN1.1T]  Family History       Problem   Relation Age of Onset   • High Blood Pressure  Mother    • Cancer  Father       of throat cancer age59     • Heart  Son      ENLARGED HEART?[AV1.2T]       ALLERGIES:[JN1.1T] Augmentin [amoxicillin-pot clavulanate]    Current Outpatient Prescriptions     Medication  Sig   • lorazepam (ATIVAN) 0.5 MG tablet Take 1 tab po 1 hr prior to procedure and 1 tablet in 1 hr if needed   • bisacodyl (DULCOLAX) 5 MG EC tablet See Colonoscopy Instructions.   • simethicone (MYLICON) 125 MG chewable tablet See Colonoscopy instructions.   • Na Sulfate-K Sulfate-Mg Sulf (SUPREP BOWEL PREP KIT) 17.5-3.13-1.6 GM/180ML SOLN See Colonoscopy Instructions.   • tramadol (ULTRAM) 50 MG tablet Take 1 Tab by mouth every 8 (eight) hours as needed for Pain.   • levothyroxine 100 MCG 
tablet TAKE 1 TABLET EVERY DAY   • Blood Glucose Monitoring Suppl (ACCU-CHEK KAREEM PLUS) W/DEVICE KIT Accu chek Kareem Plus Test 2X daily Dx DM TYPE II-UNCOMPL E11.9 Insulin:No   • glucose blood (ACCU-CHEK KAREEM) test strip Accu chek Kareem Plus Test 2X daily Dx DM TYPE II-UNCOMPL E11.9 Insulin:No   • Lancets MISC Accu chek Kareem Plus Test 2X daily Dx DM TYPE II-UNCOMPL E11.9 Insulin:No   • potassium chloride SA (KLOR-CON) 20 MEQ tablet Take 1 Tab by mouth daily.   • metoprolol (TOPROL-XL) 25 MG 24 hr tablet Take 1 Tab by mouth daily. (replaces Atenolol)   • lisinopril-hydrochlorothiazide (PRINZIDE,ZESTORETIC) 20-25 MG per tablet Take 2 Tabs by mouth daily.   • hydroxyurea (HYDREA) 500 MG Cap Take 2 Caps by mouth daily.   • azelastine (OPTIVAR) 0.05 % ophthalmic solution Place 1 Drop in both eyes 2 (two) times daily.   • aspirin 81 MG tablet Take 81 mg by mouth daily.[AV1.2T]       Review of Systems:  GENERAL:[JN1.1T] No fever, chills, night sweats.[AV1.1M].  HEENT:[JN1.1T] Normal hearing[AV1.1M].   RESPIRATORY:[JN1.1T] No difficulty breathing[AV1.1M].  CARDIAC:[JN1.1T] No chest pain, palpitations[AV1.1M]  GENITOURINARY:[JN1.1T] No bladder incontinence or retention[AV1.1M].  GI:[JN1.1T] No problems with bowel incontinence or retention[AV1.1M].  MUSCULOSKELETAL:[JN1.1T] No extremity edema.[AV1.1M].  NEUROLOGIC:[JN1.1T] No seizures[AV1.1M].  PSYCHIATRIC:[JN1.1T] No psychiatric illness[AV1.1M].  HEMATOLOGIC:[JN1.1T] No bleeding disorder.[AV1.1M].  ENDOCRINE:[JN1.1T] No diabetes[AV1.1M].  SKIN:[JN1.1T] No skin breakdown[AV1.1M]  A 10 point review of systems has been completed and is detailed here and per above in the HPI.  All other systems reviewed are negative    OBJECTIVE[JN1.1T]  Filed Vitals:     09/26/18 1156   Weight: 181 lb (82.1 kg)   Height: 5' 5\" (1.651 m)[JN1.4T]   Vitals:[JN1.1T] Ht 5' 5\" (1.651 m)  Wt 181 lb (82.1 kg)  BMI 30.12 kg/m2[AV1.2T]  Luz Dye's BMI is[JN1.1T] 30.12[AV1.2T].  Physical 
Exam:  GENERAL:  Well nourished, well developed. Awake, alert and oriented x 4. Pt is[JN1.1T] in no distress[JN1.1M]. Unaccompanied  HEENT: Head is normocephalic, atraumatic. Nares patent. O/P clear. MMM.   CARDIAC: Regular rate, rhythm.   PULMONARY: Normal respiratory effort.  CTA bilaterally.  GI: soft, +BS. No obvious organomegaly.   EXT: no cyanosis, no edema. Calves nontender to palpation.  MUSCULOSKELETAL:   Gait:[JN1.1T] POS H/T[JN1.2M]  RUBIO NEG  NO SIGN TTP TO T/LS SPINE[JN1.1T]  NEG[JN1.3M] EXT[JN1.1T]  NEG[JN1.3M] FLEX  R INT/EXT ROT HIPs NEG  L INT/EXT ROT HIPs[JN1.1T] POS[JN1.3M]  SLR NEG  BUE STRENGTH 5/5  BLE STRENGTH 5/5[JN1.1T] EXCEPT R HF 4/5[JN1.3M]  NEUROLOGICAL: SENSATION NML  DTRs UNELICITED  CLONUS NEG  Assistive Device: no.      LABS reviewed  today:  Lab Results      Component  Value Date    SEDRATE 17 11/07/2008    HGB 11.9 04/24/2018    HCT 33.0 04/24/2018    WBC 4.6 04/24/2018    ANC 2.3 04/24/2018    ALC 2.0 04/24/2018     04/24/2018    A1C 5.2 08/18/2018    RF NEGATIVE 08/24/2011    BUN 14 08/18/2018    CREAT 0.9 08/18/2018    CA 10.2 08/18/2018     08/18/2018    K 4.0 08/18/2018     08/18/2018    GLUCOSE 129 08/18/2018    GLUCOSE 129 08/18/2018    CO2 32 08/18/2018    BILI 0.4 08/18/2018    BILIDIR 0.1 06/01/2017    ALT 23 08/18/2018    AST 26 08/18/2018    ALKPHOS 87 08/18/2018    PROT 7.6 08/18/2018    ALB 4.0 08/18/2018       RADIOLOGY reviewed today:[JN1.1T]  9/26/2018 LEFT hip/pelvis xray[JN1.2M]  Mild to moderate osteoarthritis affects the left hip.  Mild osteoarthritis affects the right hip.  Moderate degenerative changes effect the pubic symphysis. Sclerosis and lucency about the pubic symphysis may reflect osteitis pubis. No acute fracture or malalignment is evident.   IMPRESSION:                                                              Osteoarthritis of the hip without evidence of acute fracture or malalignment.  Other findings as described 
above.[JN1.2C]    9/12/2018[JN1.1M] MRI LUMBAR SPINE W/O CONTRAST                                                                                                         For the purposes of this dictation, I will assume that there are 5 lumbar vertebral bodies.  The conus medullaris tip is at the L1 level.  There is preservation of vertebral body heights.  Intervertebral disk desiccation affects all the lumbar levels.  There is approximately 3 mm of retrolisthesis of L2.  There is grade 1 anterolisthesis of L4.  No spinal cord signal abnormality is identified.  No acute fracture or dislocation is evident.  No bone marrow signal abnormality is seen. There is a focus of fluid signal intensity in the mid pole right  kidney which is incompletely characterized and incompletely included in the field of view on this examination.  This could represent a renal cyst.     L1-L2:  There is a broad-based disk bulge, facet joint osteoarthritis and ligamentum flavum hypertrophy resulting in mild central spinal canal, moderate to severe right and moderate to severe left neural foraminal narrowing.  L2/L3:  There is a broad-based disk bulge, facet joint osteoarthritis and ligamentum flavum hypertrophy resulting in moderate central spinal canal, severe right and moderate left neural foraminal narrowing.  L3/L4:  There is a broad-based disk bulge, facet joint osteoarthritis and ligamentum flavum hypertrophy resulting in moderate central spinal canal stenosis, moderate to severe right and moderate left neural foraminal narrowing.  L4/L5.  There is a broad-based disk bulge, facet joint osteoarthritis and ligamentum flavum hypertrophy resulting in severe central spinal canal stenosis, moderate to severe right and severe left neural foraminal narrowing.  L5/S1:  There is a central disk protrusion, facet joint osteoarthritis and ligamentum flavum hypertrophy resulting in mild to moderate central spinal canal, mild right and moderate to 
severe left neural foraminal narrowing.      IMPRESSION:                                                              1.  Multilevel degenerative disk disease, disk bulges, facet joint osteoarthritis and ligamentum flavum hypertrophy resulting in multilevel central spinal canal and neural foraminal narrowing which appears most pronounced at the level of L4/L5 where there is severe central spinal canal stenosis, as described above.  2.  Other findings as described above.[JN1.1C]                                     ASSESSMENT[JN1.1T]  1. CHRONIC WORSENING LOW BACK PAIN INTO L[JN1.1M]EFT BUTTOCKS X 3MONTHS  2. LEFT HIP/GROIN X 3MONTHS[JN1.3M]   1. LUMBAR FACET SYNDROME  2. LUMBAR SPINAL STENOSIS[JN1.2M]  3. MRI [facility] 9/2018  1. MULTILEVEL LUMBAR DDD, FACET JT OA AND MOD-SEVERE CENTRAL AND NF STENOSIS WORST AT[JN1.1M] L2/L3 AND[JN1.2M] L4/L5.[JN1.1M]   2. RETROLISTHESIS OF L2 OF 3MM; GRADE 1 ANTEROLISTHESIS OF L4[JN1.2M]    PLAN  1. Etiology of the patient's complaints was extensively discussed with patient.  Appropriate diagnostics and therapeutics were offered.   2. Reviewed at length records in [facility] by multiple providers, outside records, history of pain, and imaging[JN1.1T], MRI [facility][JN1.1M] in detail with patient today. Answered all questions.[JN1.1T] WILL r[JN1.2M]efer to Dr. Moctezuma for interventional mgmt.[JN1.1M]  Pain is presently 0/10. Patient relaying that L hip/groin pain is what is bothering her most lately - further diagnostic workup with XRAY LEFT Hip/pelvis done today and showed mild[JN1.2M] to moderate osteoarthritis affects the left hip.  Moderate degenerative changes pubic symphysis.[JN1.2C]  ORDER LEFT HIP STEROID INJECTION under fluoroscopic guidance in [facility] with Dr. Moctezuma.[JN1.2M] RISKS AND BENEFITS OF INTERVENTIONAL MANAGEMENT (INJECTION THERAPIES) WERE DISCUSSED AND REVIEWED.  THE OBJECTIVE OF TREATMENT IS TO DECREASE PAIN AND INCREASE FUNCTIONALITY.  IF STEROID MEDICATION IS UTILIZED, 
THE PRIMARY THERAPEUTIC EFFECT IS ANTI-INFLAMMATORY IN NATURE.  THESE TREATMENTS CANNOT \"FIX\" THE PROBLEM.  INHERENT RISKS OF ANY INJECTION TREATMENT INCLUDE INFECTION, BLEEDING, INCREASED PAIN, ADVERSE EFFECTS TO BLOOD SUGAR CONTROL (DIABETES), ADVERSE EFFECTS TO BONE DENSITY (OSTEOPOROSIS) NERVE DAMAGE, PARALYSIS AND DEATH.  ADDITIONAL RISKS ASSOCIATED WITH SPINAL INJECTIONS INCLUDE SPINAL HEADACHE AND PNEUMOTHORAX.  3. Discussed at length with patient about pain medication management.[JN1.2T]  4. Will trial Tylenol #3 1-2 tab po Qdaily prn pain #60. Side effects reviewed.[JN1.2M]     5. CAUTION WITH LIFTING, BENDING, PUSHING, PULLING[JN1.1T]  6. F/U with me in 6 weeks; F/U with Dr. Moctezuma in ASC.[JN1.2M]   The patient and I discussed their pathology on clinical presentation and any testing available. Additional diagnostic recommendations were considered and offered. Initial therapeutic options were discussed as well as more comprehensive and long term therapeutics. Any immediately necessary surgical considerations were also mentioned, but the patient understands any questions regarding surgery should be directed to an appropriate surgeon. The patient has been counseled on the need to initiate further evaluation and/or therapeutics. They are fully aware of the detrimental consequences of not following through with my recommendations, as well as the potential detrimental effects of the diagnostics and the treatments themselves, including but not limited to: no relief/ongoing pain, increased pain, bleeding, infection, spinal headache, nerve damage, paralysis, seizures, GI bleeding, addiction, dependency, and death. The patient understands my recommendations are fully within the standard of care for this community and my medical profession. Patient to call my office if they have any questions, concerns or problems.  TOTAL time spent face to face with patient was[JN1.1T] 60[JN1.2M] minutes with greater than 50% of 
the time spent on counseling and coordination of care.  SIGNED:[JN1.1T] Electronically Signed by:    Eladia Skinner MD , 9/26/2018[JN1.2T]          Revision History        User Key Date/Time User Provider Type Action    > JN1.2 09/26/18 09:43 PM Eladia Skinner MD Physician Sign     JN1.4 09/26/18 12:11 PM Eladia Skinner MD Physician      JN1.3 09/26/18 12:06 PM Eladia Skinner MD Physician      AV1.2 09/26/18 11:56 AM Alayna Lord CMA Certified Medical Assistant Sign at close encounter     AV1.1 09/26/18 11:44 AM Alayna Lord CMA Certified Medical Assistant      JN1.1 09/26/18 12:23 AM Eladia Skinner MD Physician Sign at close encounter    C - Copied, M - Manual, T - Template            
cough/fever/oral hygiene/change of breathing pattern/position upright (90Y)/gurgly voice/pneumonia/throat clearing/upper respiratory infection
change of breathing pattern/cough/gurgly voice/pneumonia/throat clearing/upper respiratory infection/fever/oral hygiene/position upright (90Y)

## 2018-12-26 NOTE — SWALLOW BEDSIDE ASSESSMENT ADULT - SWALLOW EVAL: CRITERIA FOR SKILLED INTERVENTION MET
not appropriate for swallowing intervention
no problems identified which require skilled intervention

## 2018-12-26 NOTE — PROGRESS NOTE ADULT - SUBJECTIVE AND OBJECTIVE BOX
[facility] Division of Kidney Diseases & Hypertension  FOLLOW UP NOTE  [phone]--------------------------------------------------------------------------------    HPI: 68 year old male with h/o DM, HTN presented to [facility] on 12/4/18 with complaints of SOB. Patient was found to have large left tonsillar mass invading base of tongue and has tracheostomy done on 12/5/18. 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 Nephrology was consulted for JAVIER. On review of labs prior to admisson, Scr. has been WNL. Scr. on admission was also noted to be WNL(1.22) on 12/4/18. However since 12/13/18, Scr. was noted to be trending up and was elevated at 1.44 on 12/20/18. On review of inpatient chart, patient was noted to have relatively low BP readings since 12/13. Patient has been on losartan and HCTZ for BP control. Patient also had CT chest and abdomen with IV contrast as well on 12/18/18. Patient received IVF and Scr. improved to 1.3 on 12/21/18. However Scr. had increased to 1.4 on 12/23 and is 1.33 today and hence nephrology was reconsulted. Scr. improved to 1.11 today.     Patient seen and examined. Denies c/o SOB, CP, abdominal pain. Getting feeds via PEG tube.       PAST HISTORY  --------------------------------------------------------------------------------  No significant changes to PMH, PSH, FHx, SHx, unless otherwise noted    ALLERGIES & MEDICATIONS  --------------------------------------------------------------------------------  Allergies    No Known Allergies    Intolerances      Standing Inpatient Medications  allopurinol 300 milliGRAM(s) Oral daily  dextrose 5%. 1000 milliLiter(s) IV Continuous <Continuous>  dextrose 50% Injectable 12.5 Gram(s) IV Push once  dextrose 50% Injectable 25 Gram(s) IV Push once  dextrose 50% Injectable 25 Gram(s) IV Push once  docusate sodium Liquid 100 milliGRAM(s) Oral two times a day  filgrastim-sndz Injectable 480 MICROGram(s) SubCutaneous daily  insulin lispro (HumaLOG) corrective regimen sliding scale   SubCutaneous every 6 hours  pantoprazole   Suspension 40 milliGRAM(s) Oral daily  senna Syrup 10 milliLiter(s) Oral at bedtime    PRN Inpatient Medications  acetaminophen    Suspension .. 650 milliGRAM(s) Oral every 6 hours PRN  dextrose 40% Gel 15 Gram(s) Oral once PRN  diphenhydrAMINE   Injectable 25 milliGRAM(s) IV Push once PRN  glucagon  Injectable 1 milliGRAM(s) IntraMuscular once PRN  hydrocortisone sodium succinate Injectable 100 milliGRAM(s) IV Push once PRN  meperidine     Injectable 25 milliGRAM(s) IV Push once PRN  oxyCODONE    Solution 10 milliGRAM(s) Oral every 6 hours PRN  oxyCODONE    Solution 5 milliGRAM(s) Oral every 6 hours PRN      REVIEW OF SYSTEMS  --------------------------------------------------------------------------------    Gen: No  fevers/chills  Respiratory: No dyspnea  CV: No chest pain  GI: No abdominal pain, diarrhea,  nausea, vomiting  MSK:  no edema      All other systems were reviewed and are negative, except as noted.      VITALS/PHYSICAL EXAM  --------------------------------------------------------------------------------  T(C): 37.1 (12-26-18 @ 10:45), Max: 37.2 (12-26-18 @ 02:43)  HR: 83 (12-26-18 @ 10:45) (71 - 97)  BP: 107/79 (12-26-18 @ 10:45) (102/70 - 120/61)  RR: 18 (12-26-18 @ 10:45) (14 - 18)  SpO2: 98% (12-26-18 @ 10:45) (98% - 100%)  Wt(kg): --        12-25-18 @ 07:01  -  12-26-18 @ 07:00  --------------------------------------------------------  IN: 2600 mL / OUT: 1950 mL / NET: 650 mL    12-26-18 @ 07:01  -  12-26-18 @ 13:37  --------------------------------------------------------  IN: 0 mL / OUT: 300 mL / NET: -300 mL      Physical Exam:  	  Gen: NAD  	HEENT: sclera anicteric; tracheostomy tube present.   	Pulm: CTA B/L  	CV:  S1S2  	Abd: +BS, soft   	Ext: trace edema  	Neuro: awake and alert.   	Skin: Warm and dry       LABS/STUDIES  --------------------------------------------------------------------------------              9.3    27.16 >-----------<  135      [12-26-18 @ 10:45]              27.3     140  |  105  |  24  ----------------------------<  90      [12-26-18 @ 05:45]  4.3   |  24  |  1.11        Ca     9.1     [12-26-18 @ 05:45]      Mg     2.1     [12-26-18 @ 05:45]      Phos  3.5     [12-26-18 @ 05:45]    TPro  7.0  /  Alb  3.5  /  TBili  0.5  /  DBili  x   /  AST  15  /  ALT  14  /  AlkPhos  62  [12-26-18 @ 05:45]          Creatinine Trend:  SCr 1.11 [12-26 @ 05:45]  SCr 1.23 [12-25 @ 06:35]  SCr 1.33 [12-24 @ 06:50]  SCr 1.40 [12-23 @ 07:00]  SCr 1.40 [12-22 @ 05:40]    Urinalysis - [12-20-18 @ 20:24]      Color YELLOW / Appearance CLEAR / SG 1.021 / pH 6.0      Gluc NEGATIVE / Ketone NEGATIVE  / Bili NEGATIVE / Urobili TRACE       Blood NEGATIVE / Protein 10 / Leuk Est NEGATIVE / Nitrite NEGATIVE      RBC  / WBC  / Hyaline  / Gran  / Sq Epi  / Non Sq Epi  / Bacteria     Urine Creatinine 143.50      [12-20-18 @ 20:24]  Urine Protein 13.4      [12-20-18 @ 20:24]  Urine Sodium 45      [12-20-18 @ 20:24]  Urine Potassium 50.1      [12-20-18 @ 20:24]  Urine Chloride 27      [12-20-18 @ 20:24] [facility] Division of Kidney Diseases & Hypertension  FOLLOW UP NOTE  [phone]--------------------------------------------------------------------------------    HPI: 68 year old male with h/o DM, HTN presented to [facility] on 12/4/18 with complaints of SOB. Patient was found to have large left tonsillar mass invading base of tongue and has tracheostomy done on 12/5/18. 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 Nephrology was consulted for JAVIER. On review of labs prior to admisson, Scr. has been WNL. Scr. on admission was also noted to be WNL(1.22) on 12/4/18. However since 12/13/18, Scr. was noted to be trending up and was elevated at 1.44 on 12/20/18. On review of inpatient chart, patient was noted to have relatively low BP readings since 12/13. Patient has been on losartan and HCTZ for BP control. Patient also had CT chest and abdomen with IV contrast as well on 12/18/18. Patient received IVF and Scr. improved to 1.3 on 12/21/18. However Scr. had increased to 1.4 on 12/23 and is 1.33 today and hence nephrology was reconsulted. Scr. improved to 1.11 today.     Patient seen and examined. Denies c/o SOB, CP, abdominal pain. Getting feeds via PEG tube.       PAST HISTORY  --------------------------------------------------------------------------------  No significant changes to PMH, PSH, FHx, SHx, unless otherwise noted    ALLERGIES & MEDICATIONS  --------------------------------------------------------------------------------  Allergies    No Known Allergies    Intolerances      Standing Inpatient Medications  allopurinol 300 milliGRAM(s) Oral daily  dextrose 5%. 1000 milliLiter(s) IV Continuous <Continuous>  dextrose 50% Injectable 12.5 Gram(s) IV Push once  dextrose 50% Injectable 25 Gram(s) IV Push once  dextrose 50% Injectable 25 Gram(s) IV Push once  docusate sodium Liquid 100 milliGRAM(s) Oral two times a day  filgrastim-sndz Injectable 480 MICROGram(s) SubCutaneous daily  insulin lispro (HumaLOG) corrective regimen sliding scale   SubCutaneous every 6 hours  pantoprazole   Suspension 40 milliGRAM(s) Oral daily  senna Syrup 10 milliLiter(s) Oral at bedtime    PRN Inpatient Medications  acetaminophen    Suspension .. 650 milliGRAM(s) Oral every 6 hours PRN  dextrose 40% Gel 15 Gram(s) Oral once PRN  diphenhydrAMINE   Injectable 25 milliGRAM(s) IV Push once PRN  glucagon  Injectable 1 milliGRAM(s) IntraMuscular once PRN  hydrocortisone sodium succinate Injectable 100 milliGRAM(s) IV Push once PRN  meperidine     Injectable 25 milliGRAM(s) IV Push once PRN  oxyCODONE    Solution 10 milliGRAM(s) Oral every 6 hours PRN  oxyCODONE    Solution 5 milliGRAM(s) Oral every 6 hours PRN      REVIEW OF SYSTEMS  --------------------------------------------------------------------------------    Gen: No  fevers/chills  Respiratory: No dyspnea  CV: No chest pain  GI: No abdominal pain, diarrhea,  nausea, vomiting  MSK:  no edema      All other systems were reviewed and are negative, except as noted.      VITALS/PHYSICAL EXAM  --------------------------------------------------------------------------------  T(C): 37.1 (12-26-18 @ 10:45), Max: 37.2 (12-26-18 @ 02:43)  HR: 83 (12-26-18 @ 10:45) (71 - 97)  BP: 107/79 (12-26-18 @ 10:45) (102/70 - 120/61)  RR: 18 (12-26-18 @ 10:45) (14 - 18)  SpO2: 98% (12-26-18 @ 10:45) (98% - 100%)  Wt(kg): --        12-25-18 @ 07:01  -  12-26-18 @ 07:00  --------------------------------------------------------  IN: 2600 mL / OUT: 1950 mL / NET: 650 mL    12-26-18 @ 07:01  -  12-26-18 @ 13:37  --------------------------------------------------------  IN: 0 mL / OUT: 300 mL / NET: -300 mL      Physical Exam:  	  Gen: NAD  	HEENT: sclera anicteric; tracheostomy tube present.   	Pulm: CTA B/L  	CV:  S1S2  	Abd: +BS, soft, PEG,  	Ext: trace edema  	Neuro: awake and alert.   	Skin: Warm and dry       LABS/STUDIES  --------------------------------------------------------------------------------              9.3    27.16 >-----------<  135      [12-26-18 @ 10:45]              27.3     140  |  105  |  24  ----------------------------<  90      [12-26-18 @ 05:45]  4.3   |  24  |  1.11        Ca     9.1     [12-26-18 @ 05:45]      Mg     2.1     [12-26-18 @ 05:45]      Phos  3.5     [12-26-18 @ 05:45]    TPro  7.0  /  Alb  3.5  /  TBili  0.5  /  DBili  x   /  AST  15  /  ALT  14  /  AlkPhos  62  [12-26-18 @ 05:45]          Creatinine Trend:  SCr 1.11 [12-26 @ 05:45]  SCr 1.23 [12-25 @ 06:35]  SCr 1.33 [12-24 @ 06:50]  SCr 1.40 [12-23 @ 07:00]  SCr 1.40 [12-22 @ 05:40]    Urinalysis - [12-20-18 @ 20:24]      Color YELLOW / Appearance CLEAR / SG 1.021 / pH 6.0      Gluc NEGATIVE / Ketone NEGATIVE  / Bili NEGATIVE / Urobili TRACE       Blood NEGATIVE / Protein 10 / Leuk Est NEGATIVE / Nitrite NEGATIVE      RBC  / WBC  / Hyaline  / Gran  / Sq Epi  / Non Sq Epi  / Bacteria     Urine Creatinine 143.50      [12-20-18 @ 20:24]  Urine Protein 13.4      [12-20-18 @ 20:24]  Urine Sodium 45      [12-20-18 @ 20:24]  Urine Potassium 50.1      [12-20-18 @ 20:24]  Urine Chloride 27      [12-20-18 @ 20:24]

## 2018-12-26 NOTE — SWALLOW BEDSIDE ASSESSMENT ADULT - SWALLOW EVAL: RECOMMENDED FEEDING/EATING TECHNIQUES
alternate food with liquid/position upright (90 degrees)/maintain upright posture during/after eating for 30 mins/no straws/small sips/bites

## 2018-12-26 NOTE — PROGRESS NOTE ADULT - ATTENDING COMMENTS
Pt's case presented at Head and Neck Tumor Conference.  All biopsies from last week negative.  Will need rebiopsy.  Will try sono guided Core biopsy of neck mass.  If this is not diagnostic we may need to do an open neck mass biopsy.  Pt understands and wishes to proceed.
67yo M w/ L sided tonsilar mass and cervical adenopathy s/p excisional biopsy, path shows DLBCL, GCB subtype. CT C/A/P shows indeterminate bilateral adrenal nodules but otherwise no LAD. EF 35%. s/p BMbx, awaiting results. For inpatient treatment with R-CEOP starting 12/20. Monitor TLS labs, on allopurinol. Start Zarxio. Now w/ GIB, getting endoscopy.
67yo M w/ L sided tonsilar mass and cervical adenopathy s/p excisional biopsy, path shows DLBCL, GCB subtype. CT C/A/P shows indeterminate bilateral adrenal nodules but otherwise no LAD. EF 35%. s/p BMbx, awaiting results. For inpatient treatment with R-CEOP starting today. Monitor TLS labs, start allopurinol.
68y Male w/ L tonsillar mass who presented to the ED with SOB with inability to tolerate his secretions s/p  fiberoptic intubation, tracheotomy, direct laryngoscopy, biopsy , recovering without issue     PLAN:    Neurologic: pain control with IV tylenol/morphine prn  Respiratory: trach collar as toelrated  Cardiovascular: c/w home meds  Gastrointestinal/Nutrition: NPO/IVF, possible PEG placement today with Thoracic surgery  Renal/Genitourinary: monitor electrolytes and replete prn  Hematologic: monitor H/H  Infectious Disease: N/A  Tubes/Lines/Drains: trach  Endocrine: monitor FS, ISS   Disposition: PACU, likely downgrade to floor today  The patient is not a critical care patient with no life threatening hemodynamic and metabolic instability in SICU.  I have personally interviewed when possible and examined the patient, reviewed data and laboratory tests/x-rays and all pertinent electronic images.  I was physically present for the key portions of the evaluation and management (E/M) service provided.   The SICU team has a constant risk benefit analyzes discussion with the primary team, all consultants, House Staff and PA's on all decisions.  These diagnoses are unrelated to the surgical procedure noted above.  I meet with family if needed to get further history, discuss the case and make care decisions for this patient who might not be able to participate.  Time involved in performance of separately billable procedures was not counted toward my critical care time. There is no overlap.  I spent 55-75 minutes of critical care time for the diagnoses, assessment, plan and interventions.
69yo M w/ L sided tonsilar mass and cervical adenopathy s/p excisional biopsy, path shows DLBCL, GCB subtype. CT C/A/P shows indeterminate bilateral adrenal nodules but otherwise no LAD. EF 35%. s/p BMbx, awaiting results. For inpatient treatment with R-CEOP starting 12/20. Monitor TLS labs, start allopurinol.
69yo M w/ L sided tonsilar mass and cervical adenopathy s/p excisional biopsy, path shows DLBCL, GCB subtype. CT C/A/P shows indeterminate bilateral adrenal nodules but otherwise no LAD. EF 35%. s/p BMbx, awaiting results. s/p C1 R-CEOP starting 12/20 w/ improvement in LAD, speech much improved and able to swallow - speech & swallow evaluating. Cont on allopurinol. Getting Zarxio. s/p endoscopy for GIB, awaiting biopsy results, H/H stable.
Patient reports feeling well. Denies fever, nausea, vomiting, abdominal pain or any recurrence of melena. EGD noted for 2 gastric ulcers. Await gastric biopsy. Continue current regimen of pantoprazole 40 mg b.i.d. Continue to monitor serial hemoglobin/hematocrit and followup WBC regarding leukocytosis.
Patient sitting up, feeling well, swallowing ice chips.   EGD showed clean ulcers that are no longer bleeding.   Response to Chemo suggests that he may be able to intake soft solid food.   Need to progress his oral diet and have formal evaluation of swallowing as well.    This is highly important to his QOL.
Pt seen, examined with medical oncology fellow Dr Streeter. I agree with the hx, ROS, med/allergy review, PE, lab review and plan as outlined.  He will continue chemotherapy as planned with NICHOLAS SAMPSON with outpatient F/U this week with Dr Thomas, assuming stability from a hemoglobin/? GI bleed standpoint.
67yo M w/ L sided tonsilar mass and cervical adenopathy s/p excisional biopsy, awaiting path. Check CT C/A/P for staging. EF 35%. If path shows lymphoma, will need BMbx and would recommend inpatient treatment for first cycle.
Subsequent to initial assessment as described above the patient subsequently elected to proceed with EGD. See full EGD report. Findings noted for 2 gastric body ulcers with no high-risk stigmata. Biopsy of ulcer submitted to assess for possible etiology from lymphoma. PEG intact insight without indication as to source of bleeding. Active GI bleeding not detected at endoscopy examination. Advise continued monitoring of serial hemoglobin/hematocrit in conjunction with pantoprazole 40 mg b.i.d. Await biopsy. Can resume PEG feeding.
67yo M w/ L sided tonsilar mass and cervical adenopathy s/p excisional biopsy, path shows DLBCL, GCB subtype. CT C/A/P shows indeterminate bilateral adrenal nodules but otherwise no LAD. EF 35%. BMbx today and plan for inpatient treatment with R-CEOP tomorrow. Monitor TLS labs, start allopurinol.
Pt seen, examined with medical oncology fellow Dr Lamb. I agree with the hx, ROS, med/allergy review, PE, lab review and plan as outlined.  He will continue R CEOP whilst getting GI evaluation.

## 2018-12-26 NOTE — PROGRESS NOTE ADULT - PROBLEM SELECTOR PLAN 2
Patient with hypernatremia in setting of insensible losses, decreased free water intake, and recent chemotherapy. Latest serum sodium noted to be 140. C/w free water supplementation via PEG tube. Monitor serum sodium.

## 2018-12-26 NOTE — PROGRESS NOTE ADULT - SUBJECTIVE AND OBJECTIVE BOX
Chief Complaint:  Patient is a 68y old  Male who presents with a chief complaint of respiratory failure (25 Dec 2018 14:43)      Interval Events:   patient is awake and alert this morning without any complaints, He denies any bowel movements, nausea, vomiting or abdominal pain. He is s/p EGD 12/24/18.     Hospital Medications:  acetaminophen    Suspension .. 650 milliGRAM(s) Oral every 6 hours PRN  allopurinol 300 milliGRAM(s) Oral daily  dextrose 40% Gel 15 Gram(s) Oral once PRN  dextrose 5%. 1000 milliLiter(s) IV Continuous <Continuous>  dextrose 50% Injectable 12.5 Gram(s) IV Push once  dextrose 50% Injectable 25 Gram(s) IV Push once  dextrose 50% Injectable 25 Gram(s) IV Push once  diphenhydrAMINE   Injectable 25 milliGRAM(s) IV Push once PRN  docusate sodium Liquid 100 milliGRAM(s) Oral two times a day  filgrastim-sndz Injectable 480 MICROGram(s) SubCutaneous daily  glucagon  Injectable 1 milliGRAM(s) IntraMuscular once PRN  hydrocortisone sodium succinate Injectable 100 milliGRAM(s) IV Push once PRN  insulin lispro (HumaLOG) corrective regimen sliding scale   SubCutaneous every 6 hours  meperidine     Injectable 25 milliGRAM(s) IV Push once PRN  oxyCODONE    Solution 10 milliGRAM(s) Oral every 6 hours PRN  oxyCODONE    Solution 5 milliGRAM(s) Oral every 6 hours PRN  pantoprazole   Suspension 40 milliGRAM(s) Oral daily  senna Syrup 10 milliLiter(s) Oral at bedtime        PHYSICAL EXAM:   Vital Signs:  Vital Signs Last 24 Hrs  T(C): 37.2 (26 Dec 2018 05:10), Max: 37.2 (26 Dec 2018 02:43)  T(F): 99 (26 Dec 2018 05:10), Max: 99 (26 Dec 2018 02:43)  HR: 80 (26 Dec 2018 05:10) (71 - 97)  BP: 104/68 (26 Dec 2018 05:10) (102/70 - 120/61)  BP(mean): --  RR: 18 (26 Dec 2018 05:10) (14 - 18)  SpO2: 98% (26 Dec 2018 05:10) (98% - 100%)  Daily     Daily     GENERAL:  Appears stated age,  no distress  HEENT:   sclera -anicteric  CHEST:   +Trach   HEART:  Regular rhythm, S1, S2,   ABDOMEN:  Soft, non-tender, non-distended, normoactive bowel sounds,  +PEG   EXTEREMITIES:  no cyanosis,clubbing or edema  SKIN:  No rash/no jaundice   NEURO:  Alert, oriented    LABS:                        8.9    31.44 )-----------( 267      ( 26 Dec 2018 05:45 )             26.1     Mean Cell Volume: 98.9 fL (12-26-18 @ 05:45)    12-26    140  |  105  |  24<H>  ----------------------------<  90  4.3   |  24  |  1.11    Ca    9.1      26 Dec 2018 05:45  Phos  3.5     12-26  Mg     2.1     12-26    TPro  7.0  /  Alb  3.5  /  TBili  0.5  /  DBili  x   /  AST  15  /  ALT  14  /  AlkPhos  62  12-26    LIVER FUNCTIONS - ( 26 Dec 2018 05:45 )  Alb: 3.5 g/dL / Pro: 7.0 g/dL / ALK PHOS: 62 u/L / ALT: 14 u/L / AST: 15 u/L / GGT: x                                       8.9    31.44 )-----------( 267      ( 26 Dec 2018 05:45 )             26.1                         8.3    7.96  )-----------( 282      ( 25 Dec 2018 06:35 )             25.5                         8.0    6.48  )-----------( 263      ( 24 Dec 2018 06:50 )             23.7                         8.0    7.85  )-----------( 287      ( 23 Dec 2018 18:20 )             23.7     Imaging:    < from: Upper Endoscopy (12.24.18 @ 14:39) >  Impression:          - Normal esophagus.                       - Z-line regular, 39 cm from the incisors.                       - Intact gastrostomy with a patent G-tube present                   characterized by healthy appearing mucosa.                       - Non-bleeding gastric ulcer with a clean ulcer base                        (Vipul Class III). Biopsied.                       - Non-bleeding gastric ulcer with a clean ulcer base                        (Vipul Class III).                       - Gastritis.                       - Normal examined duodenum.                       - Biopsies were taken with a cold forceps for                        Helicobacter pylori testing.  Recommendation:      - Return patient to hospital gee for ongoing care.                       - Resume tube feeds.                       - Use a proton pump inhibitor PO daily.                       - Await pathology results.           - Monitor hemoglobin and bowel movements    < end of copied text >

## 2018-12-26 NOTE — SWALLOW BEDSIDE ASSESSMENT ADULT - SWALLOW EVAL: RECOMMENDED DIET
1) Continue NPO with non-oral means of nutrition/hydration/medication; 2) MD to re-consult this service as appropriate pending plan of treatment
1) Continue non-oral means of nutrition/hydration/medication as primary; 2) Initiate dysphagia 1 and honey thickened liquids as tolerated.

## 2018-12-27 LAB
ALBUMIN SERPL ELPH-MCNC: 3.2 G/DL — LOW (ref 3.3–5)
ALP SERPL-CCNC: 60 U/L — SIGNIFICANT CHANGE UP (ref 40–120)
ALT FLD-CCNC: 13 U/L — SIGNIFICANT CHANGE UP (ref 4–41)
AST SERPL-CCNC: 9 U/L — SIGNIFICANT CHANGE UP (ref 4–40)
BASOPHILS # BLD AUTO: 0.05 K/UL — SIGNIFICANT CHANGE UP (ref 0–0.2)
BASOPHILS NFR BLD AUTO: 0.3 % — SIGNIFICANT CHANGE UP (ref 0–2)
BILIRUB SERPL-MCNC: 0.5 MG/DL — SIGNIFICANT CHANGE UP (ref 0.2–1.2)
BUN SERPL-MCNC: 18 MG/DL — SIGNIFICANT CHANGE UP (ref 7–23)
CALCIUM SERPL-MCNC: 8.9 MG/DL — SIGNIFICANT CHANGE UP (ref 8.4–10.5)
CHLORIDE SERPL-SCNC: 103 MMOL/L — SIGNIFICANT CHANGE UP (ref 98–107)
CO2 SERPL-SCNC: 25 MMOL/L — SIGNIFICANT CHANGE UP (ref 22–31)
CREAT SERPL-MCNC: 0.95 MG/DL — SIGNIFICANT CHANGE UP (ref 0.5–1.3)
EOSINOPHIL # BLD AUTO: 0.17 K/UL — SIGNIFICANT CHANGE UP (ref 0–0.5)
EOSINOPHIL NFR BLD AUTO: 1.2 % — SIGNIFICANT CHANGE UP (ref 0–6)
GLUCOSE SERPL-MCNC: 112 MG/DL — HIGH (ref 70–99)
HCT VFR BLD CALC: 23.6 % — LOW (ref 39–50)
HGB BLD-MCNC: 7.9 G/DL — LOW (ref 13–17)
IMM GRANULOCYTES # BLD AUTO: 1.36 # — SIGNIFICANT CHANGE UP
IMM GRANULOCYTES NFR BLD AUTO: 9.3 % — HIGH (ref 0–1.5)
LYMPHOCYTES # BLD AUTO: 1.24 K/UL — SIGNIFICANT CHANGE UP (ref 1–3.3)
LYMPHOCYTES # BLD AUTO: 8.5 % — LOW (ref 13–44)
MAGNESIUM SERPL-MCNC: 2.1 MG/DL — SIGNIFICANT CHANGE UP (ref 1.6–2.6)
MANUAL SMEAR VERIFICATION: SIGNIFICANT CHANGE UP
MCHC RBC-ENTMCNC: 32.8 PG — SIGNIFICANT CHANGE UP (ref 27–34)
MCHC RBC-ENTMCNC: 33.5 % — SIGNIFICANT CHANGE UP (ref 32–36)
MCV RBC AUTO: 97.9 FL — SIGNIFICANT CHANGE UP (ref 80–100)
MONOCYTES # BLD AUTO: 0.02 K/UL — SIGNIFICANT CHANGE UP (ref 0–0.9)
MONOCYTES NFR BLD AUTO: 0.1 % — LOW (ref 2–14)
NEUTROPHILS # BLD AUTO: 11.73 K/UL — HIGH (ref 1.8–7.4)
NEUTROPHILS NFR BLD AUTO: 80.6 % — HIGH (ref 43–77)
NRBC # FLD: 0 — SIGNIFICANT CHANGE UP
PHOSPHATE SERPL-MCNC: 3.3 MG/DL — SIGNIFICANT CHANGE UP (ref 2.5–4.5)
PLATELET # BLD AUTO: 225 K/UL — SIGNIFICANT CHANGE UP (ref 150–400)
PMV BLD: 11.4 FL — SIGNIFICANT CHANGE UP (ref 7–13)
POTASSIUM SERPL-MCNC: 4.3 MMOL/L — SIGNIFICANT CHANGE UP (ref 3.5–5.3)
POTASSIUM SERPL-SCNC: 4.3 MMOL/L — SIGNIFICANT CHANGE UP (ref 3.5–5.3)
PROT SERPL-MCNC: 6.3 G/DL — SIGNIFICANT CHANGE UP (ref 6–8.3)
RBC # BLD: 2.41 M/UL — LOW (ref 4.2–5.8)
RBC # FLD: 12.4 % — SIGNIFICANT CHANGE UP (ref 10.3–14.5)
SODIUM SERPL-SCNC: 138 MMOL/L — SIGNIFICANT CHANGE UP (ref 135–145)
WBC # BLD: 14.57 K/UL — HIGH (ref 3.8–10.5)
WBC # FLD AUTO: 14.57 K/UL — HIGH (ref 3.8–10.5)

## 2018-12-27 PROCEDURE — 99233 SBSQ HOSP IP/OBS HIGH 50: CPT

## 2018-12-27 PROCEDURE — 74230 X-RAY XM SWLNG FUNCJ C+: CPT | Mod: 26

## 2018-12-27 RX ORDER — OXYCODONE HYDROCHLORIDE 5 MG/1
5 TABLET ORAL EVERY 6 HOURS
Qty: 0 | Refills: 0 | Status: DISCONTINUED | OUTPATIENT
Start: 2018-12-27 | End: 2018-12-31

## 2018-12-27 RX ORDER — OXYCODONE HYDROCHLORIDE 5 MG/1
10 TABLET ORAL EVERY 6 HOURS
Qty: 0 | Refills: 0 | Status: DISCONTINUED | OUTPATIENT
Start: 2018-12-27 | End: 2018-12-31

## 2018-12-27 RX ADMIN — PANTOPRAZOLE SODIUM 40 MILLIGRAM(S): 20 TABLET, DELAYED RELEASE ORAL at 12:12

## 2018-12-27 RX ADMIN — Medication 300 MILLIGRAM(S): at 12:12

## 2018-12-27 RX ADMIN — OXYCODONE HYDROCHLORIDE 5 MILLIGRAM(S): 5 TABLET ORAL at 18:55

## 2018-12-27 RX ADMIN — Medication 480 MICROGRAM(S): at 12:12

## 2018-12-27 RX ADMIN — OXYCODONE HYDROCHLORIDE 5 MILLIGRAM(S): 5 TABLET ORAL at 18:25

## 2018-12-27 RX ADMIN — Medication 100 MILLIGRAM(S): at 06:13

## 2018-12-27 NOTE — SWALLOW VFSS/MBS ASSESSMENT ADULT - RECOMMENDED CONSISTENCY
1.) Continue with Puree and Honey Thick Liquids. Continue with PEG Tube Feedings for Nutritional Support to ensure adequate caloric/hydration/medication needs.  2.) Feeding/Swallowing Guidelines: Sit upright, encourage teaspoon amount at a time, encourage small single cup sips of Honey Thick Liquids; two swallows per puree/honey thick liquids; alternate with a honey thick liquid wash after every 2 teaspoon of puree.  3.) Aspiration precautions  4.) Maintain Good Oral Hygiene Care 1.) Continue with Puree and Honey Thick Liquids. Continue with PEG Tube Feedings for Nutritional Support to ensure adequate caloric/hydration/medication needs. Patient to wear PMV during meals.   2.) Feeding/Swallowing Guidelines: Sit upright, encourage teaspoon amount at a time, encourage small single cup sips of Honey Thick Liquids; two swallows per puree/honey thick liquids; alternate with a honey thick liquid wash after every 2 teaspoon of puree.  3.) Aspiration precautions  4.) Maintain Good Oral Hygiene Care

## 2018-12-27 NOTE — SWALLOW VFSS/MBS ASSESSMENT ADULT - COMMENTS
67 yo male PMHx HTN, diet controlled DM type 2, asthma, recent tonsillar mass Bx in clinic, admitted for airway protection s/p trach and peg- was on ENT service- biopsy shows DLBCL- transferred to medicine for chemo s/p Rituxumab yesterday and continuing chemotherapy until 12/23 nwo c/b by acute blood loss anemia, suspected GI source, initially apprehensive for endoscopy, agreed for endoscopy after son's visit.     Patient was seen for a Clinical Swallow Eval on 12/26/2018 (See Consult).     Patient arrived to Radiology for a Cinesophagram. Patient was accompanied by Nurse.     Patient is with #6 Tracheostomy in place with PMV (White) also in place. Patient is vocalizing/communicating basic wants/needs.

## 2018-12-27 NOTE — PROGRESS NOTE ADULT - SUBJECTIVE AND OBJECTIVE BOX
Patient is a 68y old  Male who presents with a chief complaint of respiratory failure (26 Dec 2018 15:14)      SUBJECTIVE / OVERNIGHT EVENTS:  Patient seen and examined this morning. No overnight events. Denies any chest pain, shortness of breath, fevers, chills, nausea or vomiting. States that he looks forward to discharge.       MEDICATIONS  (STANDING):  allopurinol 300 milliGRAM(s) Oral daily  dextrose 5%. 1000 milliLiter(s) (50 mL/Hr) IV Continuous <Continuous>  dextrose 50% Injectable 12.5 Gram(s) IV Push once  dextrose 50% Injectable 25 Gram(s) IV Push once  dextrose 50% Injectable 25 Gram(s) IV Push once  docusate sodium Liquid 100 milliGRAM(s) Oral two times a day  filgrastim-sndz Injectable 480 MICROGram(s) SubCutaneous daily  insulin lispro (HumaLOG) corrective regimen sliding scale   SubCutaneous every 6 hours  pantoprazole   Suspension 40 milliGRAM(s) Oral daily  senna Syrup 10 milliLiter(s) Oral at bedtime    MEDICATIONS  (PRN):  acetaminophen    Suspension .. 650 milliGRAM(s) Oral every 6 hours PRN Mild Pain (1 - 3)  dextrose 40% Gel 15 Gram(s) Oral once PRN Blood Glucose LESS THAN 70 milliGRAM(s)/deciliter  diphenhydrAMINE   Injectable 25 milliGRAM(s) IV Push once PRN REACTION  glucagon  Injectable 1 milliGRAM(s) IntraMuscular once PRN Glucose LESS THAN 70 milligrams/deciliter  hydrocortisone sodium succinate Injectable 100 milliGRAM(s) IV Push once PRN REACTION  meperidine     Injectable 25 milliGRAM(s) IV Push once PRN RIGORS  oxyCODONE    Solution 10 milliGRAM(s) Oral every 6 hours PRN Severe Pain (7 - 10)  oxyCODONE    Solution 5 milliGRAM(s) Oral every 6 hours PRN Moderate Pain (4 - 6)      PHYSICAL EXAM:  T(C): 36.7 (12-27-18 @ 09:50), Max: 37.3 (12-27-18 @ 02:11)  HR: 81 (12-27-18 @ 09:50) (81 - 95)  BP: 107/65 (12-27-18 @ 09:50) (99/68 - 126/78)  RR: 17 (12-27-18 @ 09:50) (14 - 18)  SpO2: 100% (12-27-18 @ 09:50) (97% - 100%)  I&O's Summary    26 Dec 2018 07:01  -  27 Dec 2018 07:00  --------------------------------------------------------  IN: 2570 mL / OUT: 2650 mL / NET: -80 mL    27 Dec 2018 07:01  -  27 Dec 2018 11:10  --------------------------------------------------------  IN: 0 mL / OUT: 450 mL / NET: -450 mL      GENERAL: NAD, well-developed  HEAD:  Atraumatic, Normocephalic  EYES: EOMI, PERRLA, conjunctiva and sclera clear  NECK: trach, Cervical lymphadenopathy  CHEST/LUNG: Clear to auscultation bilaterally; No wheeze, continues to have mild b/l rhonchi  HEART: Regular rate and rhythm; No murmurs, rubs, or gallops  ABDOMEN: Soft, Nontender, Nondistended; Bowel sounds present  EXTREMITIES:  2+ Peripheral Pulses, No clubbing, cyanosis, or edema  PSYCH: AAOx3  NEUROLOGY: CN II-XII grossly intact, moving all extremities  SKIN: No rashes or lesions      LABS:  CAPILLARY BLOOD GLUCOSE      POCT Blood Glucose.: 120 mg/dL (27 Dec 2018 06:22)  POCT Blood Glucose.: 136 mg/dL (26 Dec 2018 23:44)  POCT Blood Glucose.: 123 mg/dL (26 Dec 2018 17:31)  POCT Blood Glucose.: 109 mg/dL (26 Dec 2018 11:32)                          7.9    14.57 )-----------( 225      ( 27 Dec 2018 06:15 )             23.6     12-27    138  |  103  |  18  ----------------------------<  112<H>  4.3   |  25  |  0.95    Ca    8.9      27 Dec 2018 06:15  Phos  3.3     12-27  Mg     2.1     12-27    TPro  6.3  /  Alb  3.2<L>  /  TBili  0.5  /  DBili  x   /  AST  9   /  ALT  13  /  AlkPhos  60  12-27              RADIOLOGY & ADDITIONAL TESTS:    Imaging Personally Reviewed:    Consultant(s) Notes Reviewed:  Bedside swallow eval appreciated    Care Discussed with Consultants/Other Providers:

## 2018-12-27 NOTE — SWALLOW VFSS/MBS ASSESSMENT ADULT - DIAGNOSTIC IMPRESSIONS
Patient presents with Mild Oral Stage and Moderate Pharyngeal Stage Dysphagia. The Oral Stage is characterized by adequate oral containment, slow/prolonged mashing/gumming for solid due to edentulous state, slow bolus manipulation, slow tongue motion with slow anterior to posterior transfer of the bolus for solid; adequate bolus manipulation and transport for puree with adequate oral clearance post swallow.  The Pharyngeal Stage is characterized by delayed initiation of the pharyngeal swallow (Bolus head is at the pyriforms for Thin Liquids), reduced laryngeal elevation, reduced tongue base retraction resulting in moderate vallecular residue post primary swallow and reduced pharyngeal constriction resulting in trace/mild pyriforms residue post swallow.  There is moderate pharyngeal clearance deficits located primarily in the vallecular/pyriforms post swallow.  There was Laryngeal Penetration during the swallow for Thin Liquids and Nectar Thick Liquids without retrieval leaving residue in the laryngeal vestibule above the level of the vocal folds.  There was Laryngeal Penetration after the swallow from consecutive cup sip drinking of honey thick liquids without retrieval leaving residue in the laryngeal vestibule above the level of the vocal folds. Compensatory strategy of Chin Down posture did not benefit to eliminate the Laryngeal Penetration.  There was No Aspiration observed before, during or after the swallow for puree and honey thick liquids via teaspoon or small single cup sip. Patient presents with Mild Oral Stage and Moderate Pharyngeal Stage Dysphagia. The Oral Stage is characterized by adequate oral containment, slow/prolonged mashing/gumming for solid due to edentulous state, slow bolus manipulation, slow tongue motion with slow anterior to posterior transfer of the bolus for solid; adequate bolus manipulation and transport for puree with adequate oral clearance post swallow.  The Pharyngeal Stage is characterized by delayed initiation of the pharyngeal swallow (Bolus head is at the pyriforms for Thin Liquids), reduced laryngeal elevation, reduced tongue base retraction resulting in moderate vallecular residue post primary swallow and reduced pharyngeal constriction resulting in trace/mild pyriforms residue post swallow.  There is moderate pharyngeal clearance deficits located primarily in the vallecular/pyriforms post swallow.  There was Laryngeal Penetration during the swallow for Thin Liquids and Nectar Thick Liquids without retrieval leaving residue in the laryngeal vestibule above the level of the vocal folds.  There was Laryngeal Penetration after the swallow from consecutive cup sip drinking of honey thick liquids without retrieval leaving residue in the laryngeal vestibule above the level of the vocal folds. Compensatory strategy of Chin Down posture did not benefit to eliminate the Laryngeal Penetration.  There was No Aspiration observed before, during or after the swallow for puree and honey thick liquids via teaspoon or small single cup sip.     Of Note: Cinesophagram completed with PMV in place. Patient presents with Mild Oral Stage and Moderate Pharyngeal Stage Dysphagia. The Oral Stage is characterized by adequate oral containment, slow/prolonged mashing/gumming for solid due to edentulous state, slow bolus manipulation, slow tongue motion with slow anterior to posterior transfer of the bolus for solid; adequate bolus manipulation and transport for puree with adequate oral clearance post swallow.  The Pharyngeal Stage is characterized by delayed initiation of the pharyngeal swallow (Bolus head is at the pyriforms for Thin Liquids), reduced laryngeal elevation, reduced tongue base retraction resulting in moderate vallecular residue post primary swallow and reduced pharyngeal constriction resulting in trace/mild pyriforms residue post swallow.  There is moderate pharyngeal clearance deficits located primarily in the vallecular/pyriforms post swallow.  There was Laryngeal Penetration during the swallow for Thin Liquids and Nectar Thick Liquids without retrieval leaving residue in the laryngeal vestibule above the level of the vocal folds.  There was Laryngeal Penetration after the swallow from consecutive cup sip drinking of honey thick liquids without retrieval leaving residue in the laryngeal vestibule above the level of the vocal folds. Patient is verbally cued to cough.  Compensatory strategy of Chin Down posture did not benefit to eliminate the Laryngeal Penetration.  However, there was No Aspiration observed before, during or after the swallow for puree and honey thick liquids via teaspoon or small single cup sip.     Of Note: Cinesophagram completed with PMV in place.

## 2018-12-27 NOTE — SWALLOW VFSS/MBS ASSESSMENT ADULT - ADDITIONAL RECOMMENDATIONS
Patient will benefit swallowing therapy pending discharge plans (e.g. Rehab Center vs home care vs Outpatient at [facility] [phone])

## 2018-12-28 ENCOUNTER — APPOINTMENT (OUTPATIENT)
Dept: HEMATOLOGY ONCOLOGY | Facility: CLINIC | Age: 68
End: 2018-12-28

## 2018-12-28 LAB
ALBUMIN SERPL ELPH-MCNC: 3.7 G/DL — SIGNIFICANT CHANGE UP (ref 3.3–5)
ALP SERPL-CCNC: 73 U/L — SIGNIFICANT CHANGE UP (ref 40–120)
ALT FLD-CCNC: 11 U/L — SIGNIFICANT CHANGE UP (ref 4–41)
AST SERPL-CCNC: 9 U/L — SIGNIFICANT CHANGE UP (ref 4–40)
BILIRUB SERPL-MCNC: 0.6 MG/DL — SIGNIFICANT CHANGE UP (ref 0.2–1.2)
BUN SERPL-MCNC: 14 MG/DL — SIGNIFICANT CHANGE UP (ref 7–23)
CALCIUM SERPL-MCNC: 9.2 MG/DL — SIGNIFICANT CHANGE UP (ref 8.4–10.5)
CHLORIDE SERPL-SCNC: 102 MMOL/L — SIGNIFICANT CHANGE UP (ref 98–107)
CO2 SERPL-SCNC: 26 MMOL/L — SIGNIFICANT CHANGE UP (ref 22–31)
CREAT SERPL-MCNC: 1.04 MG/DL — SIGNIFICANT CHANGE UP (ref 0.5–1.3)
GLUCOSE SERPL-MCNC: 123 MG/DL — HIGH (ref 70–99)
HCT VFR BLD CALC: 26.7 % — LOW (ref 39–50)
HEMATOPATHOLOGY REPORT: SIGNIFICANT CHANGE UP
HGB BLD-MCNC: 8.7 G/DL — LOW (ref 13–17)
LDH SERPL L TO P-CCNC: 179 U/L — SIGNIFICANT CHANGE UP (ref 135–225)
MAGNESIUM SERPL-MCNC: 2.1 MG/DL — SIGNIFICANT CHANGE UP (ref 1.6–2.6)
MCHC RBC-ENTMCNC: 32.6 % — SIGNIFICANT CHANGE UP (ref 32–36)
MCHC RBC-ENTMCNC: 32.8 PG — SIGNIFICANT CHANGE UP (ref 27–34)
MCV RBC AUTO: 100.8 FL — HIGH (ref 80–100)
NRBC # FLD: 0 — SIGNIFICANT CHANGE UP
PHOSPHATE SERPL-MCNC: 2.8 MG/DL — SIGNIFICANT CHANGE UP (ref 2.5–4.5)
PLATELET # BLD AUTO: 222 K/UL — SIGNIFICANT CHANGE UP (ref 150–400)
PMV BLD: 11.7 FL — SIGNIFICANT CHANGE UP (ref 7–13)
POTASSIUM SERPL-MCNC: 4.4 MMOL/L — SIGNIFICANT CHANGE UP (ref 3.5–5.3)
POTASSIUM SERPL-SCNC: 4.4 MMOL/L — SIGNIFICANT CHANGE UP (ref 3.5–5.3)
PROT SERPL-MCNC: 7.2 G/DL — SIGNIFICANT CHANGE UP (ref 6–8.3)
RBC # BLD: 2.65 M/UL — LOW (ref 4.2–5.8)
RBC # FLD: 12.3 % — SIGNIFICANT CHANGE UP (ref 10.3–14.5)
SODIUM SERPL-SCNC: 139 MMOL/L — SIGNIFICANT CHANGE UP (ref 135–145)
URATE SERPL-MCNC: 3 MG/DL — LOW (ref 3.4–8.8)
WBC # BLD: 6.37 K/UL — SIGNIFICANT CHANGE UP (ref 3.8–10.5)
WBC # FLD AUTO: 6.37 K/UL — SIGNIFICANT CHANGE UP (ref 3.8–10.5)

## 2018-12-28 PROCEDURE — 99233 SBSQ HOSP IP/OBS HIGH 50: CPT

## 2018-12-28 RX ORDER — INSULIN LISPRO 100/ML
VIAL (ML) SUBCUTANEOUS AT BEDTIME
Qty: 0 | Refills: 0 | Status: DISCONTINUED | OUTPATIENT
Start: 2018-12-28 | End: 2018-12-31

## 2018-12-28 RX ORDER — INSULIN LISPRO 100/ML
VIAL (ML) SUBCUTANEOUS
Qty: 0 | Refills: 0 | Status: DISCONTINUED | OUTPATIENT
Start: 2018-12-28 | End: 2018-12-31

## 2018-12-28 RX ADMIN — OXYCODONE HYDROCHLORIDE 5 MILLIGRAM(S): 5 TABLET ORAL at 02:13

## 2018-12-28 RX ADMIN — OXYCODONE HYDROCHLORIDE 5 MILLIGRAM(S): 5 TABLET ORAL at 16:07

## 2018-12-28 RX ADMIN — OXYCODONE HYDROCHLORIDE 5 MILLIGRAM(S): 5 TABLET ORAL at 16:45

## 2018-12-28 RX ADMIN — Medication 100 MILLIGRAM(S): at 05:55

## 2018-12-28 RX ADMIN — Medication 2: at 22:41

## 2018-12-28 RX ADMIN — OXYCODONE HYDROCHLORIDE 10 MILLIGRAM(S): 5 TABLET ORAL at 21:19

## 2018-12-28 RX ADMIN — PANTOPRAZOLE SODIUM 40 MILLIGRAM(S): 20 TABLET, DELAYED RELEASE ORAL at 13:48

## 2018-12-28 RX ADMIN — OXYCODONE HYDROCHLORIDE 5 MILLIGRAM(S): 5 TABLET ORAL at 01:13

## 2018-12-28 RX ADMIN — OXYCODONE HYDROCHLORIDE 10 MILLIGRAM(S): 5 TABLET ORAL at 20:49

## 2018-12-28 NOTE — PROGRESS NOTE ADULT - SUBJECTIVE AND OBJECTIVE BOX
Patient is a 68y old  Male who presents with a chief complaint of respiratory failure (27 Dec 2018 11:10)      SUBJECTIVE / OVERNIGHT EVENTS:  Patient seen and examined this morning. No overnight events. Denies any chest pain, shortness of breath, fevers, chills, nausea or vomiting. Looks forward to being discharged.      MEDICATIONS  (STANDING):  allopurinol 300 milliGRAM(s) Oral daily  dextrose 5%. 1000 milliLiter(s) (50 mL/Hr) IV Continuous <Continuous>  dextrose 50% Injectable 12.5 Gram(s) IV Push once  dextrose 50% Injectable 25 Gram(s) IV Push once  dextrose 50% Injectable 25 Gram(s) IV Push once  docusate sodium Liquid 100 milliGRAM(s) Oral two times a day  insulin lispro (HumaLOG) corrective regimen sliding scale   SubCutaneous every 6 hours  pantoprazole   Suspension 40 milliGRAM(s) Oral daily  senna Syrup 10 milliLiter(s) Oral at bedtime    MEDICATIONS  (PRN):  acetaminophen    Suspension .. 650 milliGRAM(s) Oral every 6 hours PRN Mild Pain (1 - 3)  dextrose 40% Gel 15 Gram(s) Oral once PRN Blood Glucose LESS THAN 70 milliGRAM(s)/deciliter  diphenhydrAMINE   Injectable 25 milliGRAM(s) IV Push once PRN REACTION  glucagon  Injectable 1 milliGRAM(s) IntraMuscular once PRN Glucose LESS THAN 70 milligrams/deciliter  hydrocortisone sodium succinate Injectable 100 milliGRAM(s) IV Push once PRN REACTION  oxyCODONE    Solution 10 milliGRAM(s) Oral every 6 hours PRN Severe Pain (7 - 10)  oxyCODONE    Solution 5 milliGRAM(s) Oral every 6 hours PRN Moderate Pain (4 - 6)      PHYSICAL EXAM:  T(C): 36.8 (12-28-18 @ 05:26), Max: 37.2 (12-27-18 @ 22:01)  HR: 84 (12-28-18 @ 05:26) (79 - 101)  BP: 120/81 (12-28-18 @ 05:26) (97/61 - 120/81)  RR: 18 (12-28-18 @ 05:26) (16 - 18)  SpO2: 100% (12-28-18 @ 05:26) (95% - 100%)  I&O's Summary    27 Dec 2018 07:01  -  28 Dec 2018 07:00  --------------------------------------------------------  IN: 700 mL / OUT: 2500 mL / NET: -1800 mL    28 Dec 2018 07:01  -  28 Dec 2018 14:58  --------------------------------------------------------  IN: 0 mL / OUT: 750 mL / NET: -750 mL      GENERAL: NAD, well-developed  HEAD:  Atraumatic, Normocephalic  EYES: EOMI, PERRLA, conjunctiva and sclera clear  NECK: trach, Cervical lymphadenopathy  CHEST/LUNG: Clear to auscultation bilaterally; No wheeze, continues to have mild b/l rhonchi  HEART: Regular rate and rhythm; No murmurs, rubs, or gallops  ABDOMEN: Soft, Nontender, Nondistended; Bowel sounds present  EXTREMITIES:  2+ Peripheral Pulses, No clubbing, cyanosis, or edema  PSYCH: AAOx3  NEUROLOGY: CN II-XII grossly intact, moving all extremities  SKIN: No rashes or lesions    LABS:  CAPILLARY BLOOD GLUCOSE      POCT Blood Glucose.: 112 mg/dL (28 Dec 2018 12:05)  POCT Blood Glucose.: 135 mg/dL (28 Dec 2018 06:24)  POCT Blood Glucose.: 145 mg/dL (27 Dec 2018 21:45)  POCT Blood Glucose.: 145 mg/dL (27 Dec 2018 17:58)                          8.7    6.37  )-----------( 222      ( 28 Dec 2018 06:25 )             26.7     12-28    139  |  102  |  14  ----------------------------<  123<H>  4.4   |  26  |  1.04    Ca    9.2      28 Dec 2018 06:25  Phos  2.8     12-28  Mg     2.1     12-28    TPro  7.2  /  Alb  3.7  /  TBili  0.6  /  DBili  x   /  AST  9   /  ALT  11  /  AlkPhos  73  12-28              RADIOLOGY & ADDITIONAL TESTS:    Imaging Personally Reviewed:    Consultant(s) Notes Reviewed:      Care Discussed with Consultants/Other Providers:

## 2018-12-29 DIAGNOSIS — M79.89 OTHER SPECIFIED SOFT TISSUE DISORDERS: ICD-10-CM

## 2018-12-29 LAB
ALBUMIN SERPL ELPH-MCNC: 3.4 G/DL — SIGNIFICANT CHANGE UP (ref 3.3–5)
ALP SERPL-CCNC: 65 U/L — SIGNIFICANT CHANGE UP (ref 40–120)
ALT FLD-CCNC: 8 U/L — SIGNIFICANT CHANGE UP (ref 4–41)
ANISOCYTOSIS BLD QL: SLIGHT — SIGNIFICANT CHANGE UP
ANISOCYTOSIS BLD QL: SLIGHT — SIGNIFICANT CHANGE UP
AST SERPL-CCNC: 10 U/L — SIGNIFICANT CHANGE UP (ref 4–40)
BASOPHILS # BLD AUTO: 0.02 K/UL — SIGNIFICANT CHANGE UP (ref 0–0.2)
BASOPHILS NFR BLD AUTO: 1 % — SIGNIFICANT CHANGE UP (ref 0–2)
BASOPHILS NFR SPEC: 0 % — SIGNIFICANT CHANGE UP (ref 0–2)
BASOPHILS NFR SPEC: 0 % — SIGNIFICANT CHANGE UP (ref 0–2)
BILIRUB SERPL-MCNC: 0.4 MG/DL — SIGNIFICANT CHANGE UP (ref 0.2–1.2)
BUN SERPL-MCNC: 12 MG/DL — SIGNIFICANT CHANGE UP (ref 7–23)
CALCIUM SERPL-MCNC: 9.2 MG/DL — SIGNIFICANT CHANGE UP (ref 8.4–10.5)
CHLORIDE SERPL-SCNC: 102 MMOL/L — SIGNIFICANT CHANGE UP (ref 98–107)
CO2 SERPL-SCNC: 25 MMOL/L — SIGNIFICANT CHANGE UP (ref 22–31)
CREAT SERPL-MCNC: 0.96 MG/DL — SIGNIFICANT CHANGE UP (ref 0.5–1.3)
EOSINOPHIL # BLD AUTO: 0.13 K/UL — SIGNIFICANT CHANGE UP (ref 0–0.5)
EOSINOPHIL NFR BLD AUTO: 6.8 % — HIGH (ref 0–6)
EOSINOPHIL NFR FLD: 8 % — HIGH (ref 0–6)
EOSINOPHIL NFR FLD: 8 % — HIGH (ref 0–6)
GLUCOSE SERPL-MCNC: 111 MG/DL — HIGH (ref 70–99)
HCT VFR BLD CALC: 22.5 % — LOW (ref 39–50)
HCT VFR BLD CALC: 22.5 % — LOW (ref 39–50)
HGB BLD-MCNC: 7.8 G/DL — LOW (ref 13–17)
HGB BLD-MCNC: 7.8 G/DL — LOW (ref 13–17)
IMM GRANULOCYTES # BLD AUTO: 0.01 # — SIGNIFICANT CHANGE UP
IMM GRANULOCYTES NFR BLD AUTO: 0.5 % — SIGNIFICANT CHANGE UP (ref 0–1.5)
LDH SERPL L TO P-CCNC: 154 U/L — SIGNIFICANT CHANGE UP (ref 135–225)
LYMPHOCYTES # BLD AUTO: 0.71 K/UL — LOW (ref 1–3.3)
LYMPHOCYTES # BLD AUTO: 37.2 % — SIGNIFICANT CHANGE UP (ref 13–44)
LYMPHOCYTES NFR SPEC AUTO: 40 % — SIGNIFICANT CHANGE UP (ref 13–44)
LYMPHOCYTES NFR SPEC AUTO: 40 % — SIGNIFICANT CHANGE UP (ref 13–44)
MACROCYTES BLD QL: SLIGHT — SIGNIFICANT CHANGE UP
MACROCYTES BLD QL: SLIGHT — SIGNIFICANT CHANGE UP
MAGNESIUM SERPL-MCNC: 2 MG/DL — SIGNIFICANT CHANGE UP (ref 1.6–2.6)
MANUAL SMEAR VERIFICATION: SIGNIFICANT CHANGE UP
MANUAL SMEAR VERIFICATION: SIGNIFICANT CHANGE UP
MCHC RBC-ENTMCNC: 33.2 PG — SIGNIFICANT CHANGE UP (ref 27–34)
MCHC RBC-ENTMCNC: 33.2 PG — SIGNIFICANT CHANGE UP (ref 27–34)
MCHC RBC-ENTMCNC: 34.7 % — SIGNIFICANT CHANGE UP (ref 32–36)
MCHC RBC-ENTMCNC: 34.7 % — SIGNIFICANT CHANGE UP (ref 32–36)
MCV RBC AUTO: 95.7 FL — SIGNIFICANT CHANGE UP (ref 80–100)
MCV RBC AUTO: 95.7 FL — SIGNIFICANT CHANGE UP (ref 80–100)
MONOCYTES # BLD AUTO: 0.07 K/UL — SIGNIFICANT CHANGE UP (ref 0–0.9)
MONOCYTES NFR BLD AUTO: 3.7 % — SIGNIFICANT CHANGE UP (ref 2–14)
MONOCYTES NFR BLD: 5 % — SIGNIFICANT CHANGE UP (ref 2–9)
MONOCYTES NFR BLD: 5 % — SIGNIFICANT CHANGE UP (ref 2–9)
NEUTROPHIL AB SER-ACNC: 46 % — SIGNIFICANT CHANGE UP (ref 43–77)
NEUTROPHIL AB SER-ACNC: 46 % — SIGNIFICANT CHANGE UP (ref 43–77)
NEUTROPHILS # BLD AUTO: 0.97 K/UL — LOW (ref 1.8–7.4)
NEUTROPHILS NFR BLD AUTO: 50.8 % — SIGNIFICANT CHANGE UP (ref 43–77)
NEUTS BAND # BLD: 1 % — SIGNIFICANT CHANGE UP (ref 0–6)
NEUTS BAND # BLD: 1 % — SIGNIFICANT CHANGE UP (ref 0–6)
NRBC # BLD: 0 /100WBC — SIGNIFICANT CHANGE UP
NRBC # BLD: 0 /100WBC — SIGNIFICANT CHANGE UP
NRBC # FLD: 0 — SIGNIFICANT CHANGE UP
NRBC # FLD: 0 — SIGNIFICANT CHANGE UP
PHOSPHATE SERPL-MCNC: 2.7 MG/DL — SIGNIFICANT CHANGE UP (ref 2.5–4.5)
PLATELET # BLD AUTO: 169 K/UL — SIGNIFICANT CHANGE UP (ref 150–400)
PLATELET # BLD AUTO: 169 K/UL — SIGNIFICANT CHANGE UP (ref 150–400)
PLATELET COUNT - ESTIMATE: NORMAL — SIGNIFICANT CHANGE UP
PLATELET COUNT - ESTIMATE: NORMAL — SIGNIFICANT CHANGE UP
PMV BLD: 11 FL — SIGNIFICANT CHANGE UP (ref 7–13)
PMV BLD: 11 FL — SIGNIFICANT CHANGE UP (ref 7–13)
POTASSIUM SERPL-MCNC: 4.3 MMOL/L — SIGNIFICANT CHANGE UP (ref 3.5–5.3)
POTASSIUM SERPL-SCNC: 4.3 MMOL/L — SIGNIFICANT CHANGE UP (ref 3.5–5.3)
PROT SERPL-MCNC: 7.1 G/DL — SIGNIFICANT CHANGE UP (ref 6–8.3)
RBC # BLD: 2.35 M/UL — LOW (ref 4.2–5.8)
RBC # BLD: 2.35 M/UL — LOW (ref 4.2–5.8)
RBC # FLD: 12.2 % — SIGNIFICANT CHANGE UP (ref 10.3–14.5)
RBC # FLD: 12.2 % — SIGNIFICANT CHANGE UP (ref 10.3–14.5)
SODIUM SERPL-SCNC: 137 MMOL/L — SIGNIFICANT CHANGE UP (ref 135–145)
URATE SERPL-MCNC: 3.3 MG/DL — LOW (ref 3.4–8.8)
WBC # BLD: 2.07 K/UL — LOW (ref 3.8–10.5)
WBC # BLD: 2.07 K/UL — LOW (ref 3.8–10.5)
WBC # FLD AUTO: 2.07 K/UL — LOW (ref 3.8–10.5)
WBC # FLD AUTO: 2.07 K/UL — LOW (ref 3.8–10.5)

## 2018-12-29 PROCEDURE — 99232 SBSQ HOSP IP/OBS MODERATE 35: CPT

## 2018-12-29 RX ADMIN — OXYCODONE HYDROCHLORIDE 10 MILLIGRAM(S): 5 TABLET ORAL at 23:18

## 2018-12-29 RX ADMIN — PANTOPRAZOLE SODIUM 40 MILLIGRAM(S): 20 TABLET, DELAYED RELEASE ORAL at 11:29

## 2018-12-29 RX ADMIN — OXYCODONE HYDROCHLORIDE 10 MILLIGRAM(S): 5 TABLET ORAL at 16:53

## 2018-12-29 RX ADMIN — OXYCODONE HYDROCHLORIDE 10 MILLIGRAM(S): 5 TABLET ORAL at 23:48

## 2018-12-29 RX ADMIN — Medication 300 MILLIGRAM(S): at 11:29

## 2018-12-29 RX ADMIN — OXYCODONE HYDROCHLORIDE 10 MILLIGRAM(S): 5 TABLET ORAL at 17:35

## 2018-12-29 NOTE — PROGRESS NOTE ADULT - SUBJECTIVE AND OBJECTIVE BOX
Dr. Rodríguez pager [phone]    Patient is a 68y old  Male who presents with a chief complaint of respiratory failure (28 Dec 2018 14:57)      SUBJECTIVE / OVERNIGHT EVENTS: pt eager to go home  was having some pain at right forearm site at prior IV    MEDICATIONS  (STANDING):  allopurinol 300 milliGRAM(s) Oral daily  dextrose 5%. 1000 milliLiter(s) (50 mL/Hr) IV Continuous <Continuous>  dextrose 50% Injectable 12.5 Gram(s) IV Push once  dextrose 50% Injectable 25 Gram(s) IV Push once  dextrose 50% Injectable 25 Gram(s) IV Push once  docusate sodium Liquid 100 milliGRAM(s) Oral two times a day  insulin lispro (HumaLOG) corrective regimen sliding scale   SubCutaneous three times a day before meals  insulin lispro (HumaLOG) corrective regimen sliding scale   SubCutaneous at bedtime  pantoprazole   Suspension 40 milliGRAM(s) Oral daily  senna Syrup 10 milliLiter(s) Oral at bedtime    MEDICATIONS  (PRN):  acetaminophen    Suspension .. 650 milliGRAM(s) Oral every 6 hours PRN Mild Pain (1 - 3)  dextrose 40% Gel 15 Gram(s) Oral once PRN Blood Glucose LESS THAN 70 milliGRAM(s)/deciliter  diphenhydrAMINE   Injectable 25 milliGRAM(s) IV Push once PRN REACTION  glucagon  Injectable 1 milliGRAM(s) IntraMuscular once PRN Glucose LESS THAN 70 milligrams/deciliter  hydrocortisone sodium succinate Injectable 100 milliGRAM(s) IV Push once PRN REACTION  oxyCODONE    Solution 10 milliGRAM(s) Oral every 6 hours PRN Severe Pain (7 - 10)  oxyCODONE    Solution 5 milliGRAM(s) Oral every 6 hours PRN Moderate Pain (4 - 6)      Meds ordered within last 24hours  insulin lispro (HumaLOG) corrective regimen sliding scale:       1 Unit(s) if Glucose 151 - 200      2 Unit(s) if Glucose 201 - 250      3 Unit(s) if Glucose 251 - 300      4 Unit(s) if Glucose 301 - 350      5 Unit(s) if Glucose 351 - 400      6 Unit(s) if Glucose Greater Than 400 + Contact MD  SubCutaneous, three times a day before meals  Special Instructions: Give correctional scale insulin REGARDLESS of PO status NOTIFY Provider for blood glucose LESS THAN 70 milliGRAM(s)/deciLiter or above 400 milliGRAM(s)/deciLiter.  Administration Instructions: *Per Sliding Scale*  Dispose unused medication in BLACK bin.  This is a Look-alike/Sound-alike Medication (12-28 @ 23:14)  insulin lispro (HumaLOG) corrective regimen sliding scale:       0 Unit(s) if Glucose 0 - 250      1 Unit(s) if Glucose 251 - 300      2 Unit(s) if Glucose 301 - 350      3 Unit(s) if Glucose 351 - 400      4 Unit(s) if Glucose Greater Than 400 + Contact Provider  SubCutaneous, at bedtime  Special Instructions: Give correctional scale insulin REGARDLESS of PO status NOTIFY Provider for blood glucose LESS THAN 70 milliGRAM(s)/deciLiter or above 400 milliGRAM(s)/deciLiter.  Administration Instructions: Dispose unused medication in BLACK bin.  This is a Look-alike/Sound-alike Medication (12-28 @ 23:14)      T(C): 37.5 (12-29-18 @ 14:00), Max: 37.5 (12-29-18 @ 14:00)  HR: 96 (12-29-18 @ 14:00) (78 - 96)  BP: 118/85 (12-29-18 @ 14:00) (117/68 - 132/77)  RR: 14 (12-29-18 @ 14:00) (14 - 18)  SpO2: 100% (12-29-18 @ 14:00) (94% - 100%)    CAPILLARY BLOOD GLUCOSE      POCT Blood Glucose.: 181 mg/dL (29 Dec 2018 11:28)  POCT Blood Glucose.: 110 mg/dL (29 Dec 2018 07:56)  POCT Blood Glucose.: 156 mg/dL (28 Dec 2018 22:32)  POCT Blood Glucose.: 150 mg/dL (28 Dec 2018 17:36)    I&O's Summary    28 Dec 2018 07:01  -  29 Dec 2018 07:00  --------------------------------------------------------  IN: 0 mL / OUT: 2050 mL / NET: -2050 mL    29 Dec 2018 07:01  -  29 Dec 2018 16:25  --------------------------------------------------------  IN: 970 mL / OUT: 800 mL / NET: 170 mL        PHYSICAL EXAM:  GENERAL: NAD  CHEST/LUNG: Clear to auscultation bilaterally; No wheeze  HEART: Regular rate and rhythm; No murmurs, rubs, or gallops  ABDOMEN: Soft, Nontender, Nondistended; Bowel sounds present  EXTREMITIES:  No clubbing, cyanosis, or edema +left forearm induration in antecubital fossa         LABS:                        7.8    2.07  )-----------( 169      ( 29 Dec 2018 07:47 )             22.5     12-29    137  |  102  |  12  ----------------------------<  111<H>  4.3   |  25  |  0.96    Ca    9.2      29 Dec 2018 07:47  Phos  2.7     12-29  Mg     2.0     12-29    TPro  7.1  /  Alb  3.4  /  TBili  0.4  /  DBili  x   /  AST  10  /  ALT  8   /  AlkPhos  65  12-29              RADIOLOGY & ADDITIONAL TESTS:    Imaging Personally Reviewed:    Consultant(s) Notes Reviewed:      Care Discussed with Consultants/Other Providers:

## 2018-12-29 NOTE — PROGRESS NOTE ADULT - ASSESSMENT
67 yo male PMHx HTN, diet controlled DM type 2, asthma, recent tonsillar mass Bx in clinic, admitted for airway protection s/p trach and peg- was on ENT service- biopsy shows DLBCL- transferred to medicine for chemo s/p Rituxumab and continuing chemotherapy until 12/23 nwo c/b by acute blood loss anemia, suspected GI source, s/p EGD  pt waiting for dc home once he has a bed at home

## 2018-12-29 NOTE — PROGRESS NOTE ADULT - PROBLEM SELECTOR PLAN 9
pt has to go back to his own apt not his girlfriend's apt on d/c  -social work aware, patient owns apartment, son is going to live with patient.
pt has to go back to his own apt not his girlfriend's apt on d/c  -social work aware, patient owns apartment, son is going to live with patient.  - His house does not have a bed, social work aware
pt has to go back to his own apt not his girlfriend's apt on d/c  -social work aware, patient owns apartment, son is going to live with patient.  - His house does not have a bed, social work aware

## 2018-12-30 LAB
ALBUMIN SERPL ELPH-MCNC: 3.4 G/DL — SIGNIFICANT CHANGE UP (ref 3.3–5)
ALP SERPL-CCNC: 69 U/L — SIGNIFICANT CHANGE UP (ref 40–120)
ALT FLD-CCNC: 10 U/L — SIGNIFICANT CHANGE UP (ref 4–41)
AST SERPL-CCNC: 9 U/L — SIGNIFICANT CHANGE UP (ref 4–40)
BASOPHILS # BLD AUTO: 0.02 K/UL — SIGNIFICANT CHANGE UP (ref 0–0.2)
BASOPHILS NFR BLD AUTO: 1.1 % — SIGNIFICANT CHANGE UP (ref 0–2)
BILIRUB SERPL-MCNC: 0.2 MG/DL — SIGNIFICANT CHANGE UP (ref 0.2–1.2)
BUN SERPL-MCNC: 9 MG/DL — SIGNIFICANT CHANGE UP (ref 7–23)
CALCIUM SERPL-MCNC: 9.2 MG/DL — SIGNIFICANT CHANGE UP (ref 8.4–10.5)
CHLORIDE SERPL-SCNC: 103 MMOL/L — SIGNIFICANT CHANGE UP (ref 98–107)
CO2 SERPL-SCNC: 27 MMOL/L — SIGNIFICANT CHANGE UP (ref 22–31)
CREAT SERPL-MCNC: 1 MG/DL — SIGNIFICANT CHANGE UP (ref 0.5–1.3)
EOSINOPHIL # BLD AUTO: 0.13 K/UL — SIGNIFICANT CHANGE UP (ref 0–0.5)
EOSINOPHIL NFR BLD AUTO: 7.2 % — HIGH (ref 0–6)
GLUCOSE SERPL-MCNC: 106 MG/DL — HIGH (ref 70–99)
HCT VFR BLD CALC: 23.6 % — LOW (ref 39–50)
HGB BLD-MCNC: 7.9 G/DL — LOW (ref 13–17)
IMM GRANULOCYTES # BLD AUTO: 0.01 # — SIGNIFICANT CHANGE UP
IMM GRANULOCYTES NFR BLD AUTO: 0.6 % — SIGNIFICANT CHANGE UP (ref 0–1.5)
LDH SERPL L TO P-CCNC: 141 U/L — SIGNIFICANT CHANGE UP (ref 135–225)
LYMPHOCYTES # BLD AUTO: 0.88 K/UL — LOW (ref 1–3.3)
LYMPHOCYTES # BLD AUTO: 48.6 % — HIGH (ref 13–44)
MAGNESIUM SERPL-MCNC: 1.9 MG/DL — SIGNIFICANT CHANGE UP (ref 1.6–2.6)
MCHC RBC-ENTMCNC: 32.8 PG — SIGNIFICANT CHANGE UP (ref 27–34)
MCHC RBC-ENTMCNC: 33.5 % — SIGNIFICANT CHANGE UP (ref 32–36)
MCV RBC AUTO: 97.9 FL — SIGNIFICANT CHANGE UP (ref 80–100)
MONOCYTES # BLD AUTO: 0.17 K/UL — SIGNIFICANT CHANGE UP (ref 0–0.9)
MONOCYTES NFR BLD AUTO: 9.4 % — SIGNIFICANT CHANGE UP (ref 2–14)
NEUTROPHILS # BLD AUTO: 0.6 K/UL — LOW (ref 1.8–7.4)
NEUTROPHILS NFR BLD AUTO: 33.1 % — LOW (ref 43–77)
NRBC # FLD: 0 — SIGNIFICANT CHANGE UP
PHOSPHATE SERPL-MCNC: 3 MG/DL — SIGNIFICANT CHANGE UP (ref 2.5–4.5)
PLATELET # BLD AUTO: 175 K/UL — SIGNIFICANT CHANGE UP (ref 150–400)
PMV BLD: 11.3 FL — SIGNIFICANT CHANGE UP (ref 7–13)
POTASSIUM SERPL-MCNC: 4.4 MMOL/L — SIGNIFICANT CHANGE UP (ref 3.5–5.3)
POTASSIUM SERPL-SCNC: 4.4 MMOL/L — SIGNIFICANT CHANGE UP (ref 3.5–5.3)
PROT SERPL-MCNC: 6.9 G/DL — SIGNIFICANT CHANGE UP (ref 6–8.3)
RBC # BLD: 2.41 M/UL — LOW (ref 4.2–5.8)
RBC # FLD: 11.9 % — SIGNIFICANT CHANGE UP (ref 10.3–14.5)
SODIUM SERPL-SCNC: 140 MMOL/L — SIGNIFICANT CHANGE UP (ref 135–145)
URATE SERPL-MCNC: 3.2 MG/DL — LOW (ref 3.4–8.8)
WBC # BLD: 1.81 K/UL — LOW (ref 3.8–10.5)
WBC # FLD AUTO: 1.81 K/UL — LOW (ref 3.8–10.5)

## 2018-12-30 PROCEDURE — 99232 SBSQ HOSP IP/OBS MODERATE 35: CPT

## 2018-12-30 PROCEDURE — 93971 EXTREMITY STUDY: CPT | Mod: 26

## 2018-12-30 RX ORDER — CARVEDILOL PHOSPHATE 80 MG/1
3.12 CAPSULE, EXTENDED RELEASE ORAL EVERY 12 HOURS
Qty: 0 | Refills: 0 | Status: DISCONTINUED | OUTPATIENT
Start: 2018-12-30 | End: 2018-12-31

## 2018-12-30 RX ORDER — FILGRASTIM 480MCG/1.6
480 VIAL (ML) INJECTION DAILY
Qty: 0 | Refills: 0 | Status: COMPLETED | OUTPATIENT
Start: 2018-12-30 | End: 2018-12-30

## 2018-12-30 RX ADMIN — OXYCODONE HYDROCHLORIDE 10 MILLIGRAM(S): 5 TABLET ORAL at 14:41

## 2018-12-30 RX ADMIN — OXYCODONE HYDROCHLORIDE 10 MILLIGRAM(S): 5 TABLET ORAL at 14:11

## 2018-12-30 RX ADMIN — Medication 300 MILLIGRAM(S): at 14:36

## 2018-12-30 RX ADMIN — PANTOPRAZOLE SODIUM 40 MILLIGRAM(S): 20 TABLET, DELAYED RELEASE ORAL at 12:20

## 2018-12-30 RX ADMIN — CARVEDILOL PHOSPHATE 3.12 MILLIGRAM(S): 80 CAPSULE, EXTENDED RELEASE ORAL at 17:57

## 2018-12-30 RX ADMIN — OXYCODONE HYDROCHLORIDE 10 MILLIGRAM(S): 5 TABLET ORAL at 06:18

## 2018-12-30 RX ADMIN — OXYCODONE HYDROCHLORIDE 10 MILLIGRAM(S): 5 TABLET ORAL at 05:48

## 2018-12-30 RX ADMIN — Medication 480 MICROGRAM(S): at 15:41

## 2018-12-30 NOTE — CHART NOTE - NSCHARTNOTEFT_GEN_A_CORE
Notified by RN patient was found to have small amount of blood around trach after awakening from sleep.   Patient self endorsed he had a similar episode earlier in the night.   He denies previous episodes prior to today.   According to him the trach collar was changed earlier.   Small amount of blood noted on dressing with no active bleeding, no tenderness, or erythema around the area.   Will have dressing changed and will continue to monitor. If sx persist will consult ENT.

## 2018-12-30 NOTE — PROGRESS NOTE ADULT - ASSESSMENT
69 yo male PMHx HTN, diet controlled DM type 2, asthma, recent tonsillar mass Bx in clinic, admitted for airway protection s/p trach and peg- was on ENT service- biopsy shows DLBCL- transferred to medicine for chemo s/p Rituxumab and continuing chemotherapy until 12/23 nwo c/b by acute blood loss anemia, suspected GI source, s/p EGD  pt waiting for dc home once he has a bed at home

## 2018-12-30 NOTE — PROGRESS NOTE ADULT - SUBJECTIVE AND OBJECTIVE BOX
Dr. Rodríguez pager [phone]    Patient is a 68y old  Male who presents with a chief complaint of respiratory failure (30 Dec 2018 13:58)      SUBJECTIVE / OVERNIGHT EVENTS: pt was okay when seen today at 2p- a little tired  explained drop in ANC to pt    MEDICATIONS  (STANDING):  allopurinol 300 milliGRAM(s) Oral daily  dextrose 5%. 1000 milliLiter(s) (50 mL/Hr) IV Continuous <Continuous>  dextrose 50% Injectable 12.5 Gram(s) IV Push once  dextrose 50% Injectable 25 Gram(s) IV Push once  dextrose 50% Injectable 25 Gram(s) IV Push once  docusate sodium Liquid 100 milliGRAM(s) Oral two times a day  insulin lispro (HumaLOG) corrective regimen sliding scale   SubCutaneous three times a day before meals  insulin lispro (HumaLOG) corrective regimen sliding scale   SubCutaneous at bedtime  pantoprazole   Suspension 40 milliGRAM(s) Oral daily  senna Syrup 10 milliLiter(s) Oral at bedtime    MEDICATIONS  (PRN):  acetaminophen    Suspension .. 650 milliGRAM(s) Oral every 6 hours PRN Mild Pain (1 - 3)  dextrose 40% Gel 15 Gram(s) Oral once PRN Blood Glucose LESS THAN 70 milliGRAM(s)/deciliter  diphenhydrAMINE   Injectable 25 milliGRAM(s) IV Push once PRN REACTION  glucagon  Injectable 1 milliGRAM(s) IntraMuscular once PRN Glucose LESS THAN 70 milligrams/deciliter  hydrocortisone sodium succinate Injectable 100 milliGRAM(s) IV Push once PRN REACTION  oxyCODONE    Solution 10 milliGRAM(s) Oral every 6 hours PRN Severe Pain (7 - 10)  oxyCODONE    Solution 5 milliGRAM(s) Oral every 6 hours PRN Moderate Pain (4 - 6)      Meds ordered within last 24hours  [id number]-sndz Injectable: [Ordered as ZARXIO  Injectable]  480 MICROGram(s), SubCutaneous, daily, Stop After 1 Doses  Special Instructions: As per heme/onc Dr. Escobar  Administration Instructions: refrigerate  Provider's Contact #: [phone] (12-30 @ 14:17)      T(C): 37.4 (12-30-18 @ 14:10), Max: 37.4 (12-30-18 @ 14:10)  HR: 85 (12-30-18 @ 14:10) (67 - 97)  BP: 128/81 (12-30-18 @ 14:10) (113/64 - 131/76)  RR: 18 (12-30-18 @ 14:10) (16 - 18)  SpO2: 100% (12-30-18 @ 14:10) (100% - 100%)    CAPILLARY BLOOD GLUCOSE      POCT Blood Glucose.: 117 mg/dL (30 Dec 2018 12:08)  POCT Blood Glucose.: 110 mg/dL (30 Dec 2018 07:59)  POCT Blood Glucose.: 143 mg/dL (29 Dec 2018 21:21)  POCT Blood Glucose.: 176 mg/dL (29 Dec 2018 17:31)    I&O's Summary    29 Dec 2018 07:01  -  30 Dec 2018 07:00  --------------------------------------------------------  IN: 1570 mL / OUT: 2400 mL / NET: -830 mL    30 Dec 2018 07:01  -  30 Dec 2018 16:54  --------------------------------------------------------  IN: 360 mL / OUT: 350 mL / NET: 10 mL        PHYSICAL EXAM:  GENERAL: NAD +trach/peg  CHEST/LUNG: Clear to auscultation bilaterally; No wheeze  HEART: Regular rate and rhythm; No murmurs, rubs, or gallops  ABDOMEN: Soft, Nontender, Nondistended; Bowel sounds present  EXTREMITIES:  No clubbing, cyanosis, or edema        LABS:                        7.9    1.81  )-----------( 175      ( 30 Dec 2018 07:55 )             23.6     12-30    140  |  103  |  9   ----------------------------<  106<H>  4.4   |  27  |  1.00    Ca    9.2      30 Dec 2018 07:55  Phos  3.0     12-30  Mg     1.9     12-30    TPro  6.9  /  Alb  3.4  /  TBili  0.2  /  DBili  x   /  AST  9   /  ALT  10  /  AlkPhos  69  12-30              RADIOLOGY & ADDITIONAL TESTS:    Imaging Personally Reviewed:    Consultant(s) Notes Reviewed:      Care Discussed with Consultants/Other Providers:

## 2018-12-30 NOTE — PROGRESS NOTE ADULT - SUBJECTIVE AND OBJECTIVE BOX
Pt seen and examined at bedside. Breathing and swallowing improved significantly since starting chemotherapy. Tolerating finger occlusion    Exam  NAD, awake and alert  Breathing comfortably on RA  6CFS in place, secured with soft tie, PMV in place    Fiberoptic laryngoscopy  NP wnl  BOT/vallecula wnl  No lateral pharyngeal swelling appreciated  Epiglottis sharp  AE folds and arytenoids nonedematous  TVC visible and mobile bilaterally  Airway widely patent    A/P 68M with hx of lymphoma s/p trach for airway protection, now with improved swelling.   -continue routine trach care  -continue PMV as tolerated  -will wait until after chemotherapy treatment is completed before decannulation  -patient should follow up with Dr. Billy outpatient for decannulation  -call/page with questions

## 2018-12-31 ENCOUNTER — INBOUND DOCUMENT (OUTPATIENT)
Age: 68
End: 2018-12-31

## 2018-12-31 VITALS
SYSTOLIC BLOOD PRESSURE: 121 MMHG | HEART RATE: 89 BPM | OXYGEN SATURATION: 100 % | DIASTOLIC BLOOD PRESSURE: 74 MMHG | TEMPERATURE: 99 F | RESPIRATION RATE: 18 BRPM

## 2018-12-31 LAB
ALBUMIN SERPL ELPH-MCNC: 3.4 G/DL — SIGNIFICANT CHANGE UP (ref 3.3–5)
ALP SERPL-CCNC: 69 U/L — SIGNIFICANT CHANGE UP (ref 40–120)
ALT FLD-CCNC: 10 U/L — SIGNIFICANT CHANGE UP (ref 4–41)
AST SERPL-CCNC: 7 U/L — SIGNIFICANT CHANGE UP (ref 4–40)
BILIRUB SERPL-MCNC: 0.3 MG/DL — SIGNIFICANT CHANGE UP (ref 0.2–1.2)
BLD GP AB SCN SERPL QL: NEGATIVE — SIGNIFICANT CHANGE UP
BUN SERPL-MCNC: 10 MG/DL — SIGNIFICANT CHANGE UP (ref 7–23)
CALCIUM SERPL-MCNC: 9.2 MG/DL — SIGNIFICANT CHANGE UP (ref 8.4–10.5)
CHLORIDE SERPL-SCNC: 99 MMOL/L — SIGNIFICANT CHANGE UP (ref 98–107)
CO2 SERPL-SCNC: 27 MMOL/L — SIGNIFICANT CHANGE UP (ref 22–31)
CREAT SERPL-MCNC: 1.02 MG/DL — SIGNIFICANT CHANGE UP (ref 0.5–1.3)
GLUCOSE BLDC GLUCOMTR-MCNC: 136 MG/DL — HIGH (ref 70–99)
GLUCOSE BLDC GLUCOMTR-MCNC: 158 MG/DL — HIGH (ref 70–99)
GLUCOSE SERPL-MCNC: 130 MG/DL — HIGH (ref 70–99)
HCT VFR BLD CALC: 20.4 % — CRITICAL LOW (ref 39–50)
HGB BLD-MCNC: 7.1 G/DL — LOW (ref 13–17)
LDH SERPL L TO P-CCNC: 133 U/L — LOW (ref 135–225)
MAGNESIUM SERPL-MCNC: 1.8 MG/DL — SIGNIFICANT CHANGE UP (ref 1.6–2.6)
MCHC RBC-ENTMCNC: 33.2 PG — SIGNIFICANT CHANGE UP (ref 27–34)
MCHC RBC-ENTMCNC: 34.8 % — SIGNIFICANT CHANGE UP (ref 32–36)
MCV RBC AUTO: 95.3 FL — SIGNIFICANT CHANGE UP (ref 80–100)
NRBC # FLD: 0 — SIGNIFICANT CHANGE UP
PHOSPHATE SERPL-MCNC: 3 MG/DL — SIGNIFICANT CHANGE UP (ref 2.5–4.5)
PLATELET # BLD AUTO: 132 K/UL — LOW (ref 150–400)
PMV BLD: 11.6 FL — SIGNIFICANT CHANGE UP (ref 7–13)
POTASSIUM SERPL-MCNC: 3.9 MMOL/L — SIGNIFICANT CHANGE UP (ref 3.5–5.3)
POTASSIUM SERPL-SCNC: 3.9 MMOL/L — SIGNIFICANT CHANGE UP (ref 3.5–5.3)
PROT SERPL-MCNC: 6.5 G/DL — SIGNIFICANT CHANGE UP (ref 6–8.3)
RBC # BLD: 2.14 M/UL — LOW (ref 4.2–5.8)
RBC # FLD: 12 % — SIGNIFICANT CHANGE UP (ref 10.3–14.5)
RH IG SCN BLD-IMP: POSITIVE — SIGNIFICANT CHANGE UP
SODIUM SERPL-SCNC: 136 MMOL/L — SIGNIFICANT CHANGE UP (ref 135–145)
URATE SERPL-MCNC: 3 MG/DL — LOW (ref 3.4–8.8)
WBC # BLD: 2.17 K/UL — LOW (ref 3.8–10.5)
WBC # FLD AUTO: 2.17 K/UL — LOW (ref 3.8–10.5)

## 2018-12-31 PROCEDURE — 99239 HOSP IP/OBS DSCHRG MGMT >30: CPT

## 2018-12-31 RX ORDER — CARVEDILOL PHOSPHATE 80 MG/1
1 CAPSULE, EXTENDED RELEASE ORAL
Qty: 60 | Refills: 0 | OUTPATIENT
Start: 2018-12-31 | End: 2019-01-29

## 2018-12-31 RX ORDER — PANTOPRAZOLE SODIUM 20 MG/1
40 TABLET, DELAYED RELEASE ORAL
Qty: 1200 | Refills: 0 | OUTPATIENT
Start: 2018-12-31 | End: 2019-01-29

## 2018-12-31 RX ORDER — ALLOPURINOL 300 MG
1 TABLET ORAL
Qty: 30 | Refills: 0
Start: 2018-12-31 | End: 2019-01-29

## 2018-12-31 RX ORDER — DOCUSATE SODIUM 100 MG
10 CAPSULE ORAL
Qty: 0 | Refills: 0 | COMMUNITY
Start: 2018-12-31

## 2018-12-31 RX ORDER — DOCUSATE SODIUM 100 MG
10 CAPSULE ORAL
Qty: 600 | Refills: 0
Start: 2018-12-31 | End: 2019-01-29

## 2018-12-31 RX ORDER — ALLOPURINOL 300 MG
1 TABLET ORAL
Qty: 0 | Refills: 0 | COMMUNITY
Start: 2018-12-31

## 2018-12-31 RX ORDER — CARVEDILOL PHOSPHATE 80 MG/1
1 CAPSULE, EXTENDED RELEASE ORAL
Qty: 60 | Refills: 0
Start: 2018-12-31 | End: 2019-01-29

## 2018-12-31 RX ORDER — ASPIRIN/CALCIUM CARB/MAGNESIUM 324 MG
1 TABLET ORAL
Qty: 0 | Refills: 0 | COMMUNITY

## 2018-12-31 RX ORDER — ALLOPURINOL 300 MG
1 TABLET ORAL
Qty: 30 | Refills: 0 | OUTPATIENT
Start: 2018-12-31 | End: 2019-01-29

## 2018-12-31 RX ORDER — PANTOPRAZOLE SODIUM 20 MG/1
40 TABLET, DELAYED RELEASE ORAL
Qty: 1200 | Refills: 0
Start: 2018-12-31 | End: 2019-01-29

## 2018-12-31 RX ORDER — SENNA PLUS 8.6 MG/1
10 TABLET ORAL
Qty: 0 | Refills: 0 | COMMUNITY
Start: 2018-12-31

## 2018-12-31 RX ORDER — OXYCODONE HYDROCHLORIDE 5 MG/1
10 TABLET ORAL
Qty: 280 | Refills: 0
Start: 2018-12-31 | End: 2019-01-06

## 2018-12-31 RX ORDER — SENNA PLUS 8.6 MG/1
10 TABLET ORAL
Qty: 300 | Refills: 0
Start: 2018-12-31 | End: 2019-01-29

## 2018-12-31 RX ORDER — CARVEDILOL PHOSPHATE 80 MG/1
1 CAPSULE, EXTENDED RELEASE ORAL
Qty: 0 | Refills: 0 | COMMUNITY
Start: 2018-12-31

## 2018-12-31 RX ORDER — OXYCODONE HYDROCHLORIDE 5 MG/1
10 TABLET ORAL
Qty: 0 | Refills: 0 | COMMUNITY
Start: 2018-12-31

## 2018-12-31 RX ORDER — PANTOPRAZOLE SODIUM 20 MG/1
40 TABLET, DELAYED RELEASE ORAL
Qty: 0 | Refills: 0 | COMMUNITY
Start: 2018-12-31

## 2018-12-31 RX ORDER — DOCUSATE SODIUM 100 MG
10 CAPSULE ORAL
Qty: 600 | Refills: 0 | OUTPATIENT
Start: 2018-12-31 | End: 2019-01-29

## 2018-12-31 RX ORDER — OXYCODONE HYDROCHLORIDE 5 MG/1
5 TABLET ORAL
Qty: 0 | Refills: 0 | COMMUNITY
Start: 2018-12-31

## 2018-12-31 RX ORDER — SENNA PLUS 8.6 MG/1
10 TABLET ORAL
Qty: 300 | Refills: 0 | OUTPATIENT
Start: 2018-12-31 | End: 2019-01-29

## 2018-12-31 RX ORDER — OXYCODONE HYDROCHLORIDE 5 MG/1
10 TABLET ORAL
Qty: 280 | Refills: 0 | OUTPATIENT
Start: 2018-12-31 | End: 2019-01-06

## 2018-12-31 RX ADMIN — OXYCODONE HYDROCHLORIDE 5 MILLIGRAM(S): 5 TABLET ORAL at 01:28

## 2018-12-31 RX ADMIN — Medication 1: at 17:35

## 2018-12-31 RX ADMIN — Medication 300 MILLIGRAM(S): at 11:46

## 2018-12-31 RX ADMIN — CARVEDILOL PHOSPHATE 3.12 MILLIGRAM(S): 80 CAPSULE, EXTENDED RELEASE ORAL at 17:36

## 2018-12-31 RX ADMIN — PANTOPRAZOLE SODIUM 40 MILLIGRAM(S): 20 TABLET, DELAYED RELEASE ORAL at 11:45

## 2018-12-31 RX ADMIN — OXYCODONE HYDROCHLORIDE 5 MILLIGRAM(S): 5 TABLET ORAL at 00:58

## 2018-12-31 NOTE — PROGRESS NOTE ADULT - PROBLEM SELECTOR PLAN 5
Speech swallow evaluation 12/26
PEG with bolus feeds in place, holding feeds in setting of GIB  -discussed with NP, requesting speech and swallow to re-eval tomorrow AM if possible as patientl ikely to take PO at home, want to see if improved swallowing given reduced LAD after chemo.
Speech swallow evaluation 12/26 on pureed diet   ?need for cont tube feeds if pt eating
PEG with bolus feeds in place, holding feeds in setting of GIB
PEG with bolus feeds in place, holding feeds in setting of GIB  -discussed with NP, requesting speech and swallow to re-eval 12/26, if possible as patientl ikely to take PO at home, want to see if improved swallowing given reduced LAD after chemo.
Speech swallow evaluation 12/26
Speech swallow evaluation 12/26 on pureed diet
Speech swallow evaluation 12/26 on pureed diet   hold tube feeds on d/c  daily peg flushes
gentle fluids for now  monitor renal function  ?d/t contrast  if no improvement will call renal
gentle fluids for now  monitor renal function  ?d/t contrast  renal consulted
resolved

## 2018-12-31 NOTE — PROGRESS NOTE ADULT - PROBLEM SELECTOR PLAN 3
- L tonsillar mass, management as per ENT.   - appreciate heme/onc input  -speech for passy-ambar valve  if not leaving tomorrow d/t social issues will d/w ENT re: decannulation here
management as per ENT
- L tonsillar mass, management as per ENT.   - appreciate heme/onc input  -speech for passy-ambar valve  - need clearance by RN and ENT for discharge based on ability to manage trach
- L tonsillar mass, management as per ENT.   - appreciate heme/onc input  -speech for passy-ambar valve
- L tonsillar mass, management as per ENT.   - appreciate heme/onc input  -speech for passy-ambar valve
- L tonsillar mass, management as per ENT.   - appreciate heme/onc input  -speech for passy-ambar valve  - need clearance by RN and ENT for discharge based on ability to manage trach
- L tonsillar mass, management as per ENT.   - appreciate heme/onc input  -speech for passy-ambar valve  outpt decannulation
management as per ENT
management as per ENT  - d/w ENT- ok for d/c home when chemo done
management as per ENT  - pain control as per primary team
management as per ENT  - pain control as per primary team
management as per ENT  - pain control as per primary team  will d/w ENT re: need for cont pulse ox as pt cannot be transferred to 8n for chemo
patient with EF=35%, global dysfunction, unclear etiology.  -currently compensated.  -will need to follow up with a cardiologist upon discharge to be placed on goal-directed therapy.    -off ARB for now given JAVIER, can resume as outpatietn   -if BP remains stable will start low dose coreg to be titrated as outpatient.
management as per ENT  - pain control as per primary team
management as per ENT  - pain control as per primary team
- L tonsillar mass, management as per ENT.   - appreciate heme/onc input  -speech for passy-ambar valve
- L tonsillar mass, management as per ENT.   - appreciate heme/onc input  -speech for passy-ambar valve  - need clearance by RN and ENT for discharge based on ability to manage trach

## 2018-12-31 NOTE — PROGRESS NOTE ADULT - PROBLEM SELECTOR PROBLEM 4
Systolic heart failure, unspecified HF chronicity
Hypertension, unspecified type
Systolic heart failure, unspecified HF chronicity
Dysphagia, unspecified type
Hypertension, unspecified type
Systolic heart failure, unspecified HF chronicity
Hypertension, unspecified type
Hypertension, unspecified type
Systolic heart failure, unspecified HF chronicity
Systolic heart failure, unspecified HF chronicity

## 2018-12-31 NOTE — PROGRESS NOTE ADULT - PROBLEM SELECTOR PROBLEM 1
Anemia due to blood loss
Diffuse large B-cell lymphoma of lymph nodes of neck
Fever, unspecified fever cause
JAVIER (acute kidney injury)
Localized swelling, mass and lump, head
Fever, unspecified fever cause
Anemia due to blood loss

## 2018-12-31 NOTE — PROGRESS NOTE ADULT - PROBLEM SELECTOR PLAN 7
losartan stopped as was HCTZ 12/19  -would hold HCTZ and add coreg instead given low EF and HTN
losartan stopped as was HCTZ 12/19  -would hold HCTZ and add coreg instead given low EF and HTN
pt has to go back to his own apt not his girlfriend's apt on d/c  -social work aware, patient owns apartment, son is going to live with patient.  - His house does not have a bed, social work aware
losartan stopped as was HCTZ 12/19  -would hold HCTZ and add coreg instead given low EF and HTN
improving, renal apprecaited, signed off, likely hemodynamic mediated.
losartan stopped as was HCTZ 12/19  -would hold HCTZ and add coreg instead given low EF and HTN
pt has to go back to his own apt not his girlfriend's apt on d/c  -social work aware, patient owns apartment, son is going to live with patient.  - unclear if his house has a bed or not but he will be going to his girlfriend's house- pt has capacity to make decisions re: his own discharge  pt said his son "has issues" (has been to [facility], is keeping 2 women etc.)

## 2018-12-31 NOTE — PROGRESS NOTE ADULT - ASSESSMENT
69 yo male PMHx HTN, diet controlled DM type 2, asthma, recent tonsillar mass Bx in clinic, admitted for airway protection s/p trach and peg- was on ENT service- biopsy shows DLBCL- transferred to medicine for chemo s/p Rituxumab and continuing chemotherapy until 12/23 nwo c/b by acute blood loss anemia, suspected GI source, s/p EGD  pt waiting for dc home once he has a bed at home- son did not understand what the bed issue was  writer unclear about the confusion around the bed- pt will be discharged today after prbc to his girlfriend's house.  he will make his appts he said  d/w ENT  outpt decannulation  time 40min

## 2018-12-31 NOTE — PROGRESS NOTE ADULT - PROBLEM SELECTOR PLAN 6
resolved  Patient placed on dysphagia 1 die with honey thickened liquids along with bolus feeds
resolved
losartan stopped as was HCTZ 12/19  -would hold HCTZ and add coreg instead given low EF and HTN
resolved
losartan stopped as was HCTZ 12/19  -would hold HCTZ and add coreg instead given low EF and HTN
losartan stopped as was HCTZ 12/19  -would hold HCTZ and add coreg instead given low EF and HTN
pt has to go back to his own apt not his girlfriend's apt on d/c  spoke w/ cousin Travis
resolved
resolved  Patient placed on dysphagia 1 die with honey thickened liquids along with bolus feeds
resolved  Patient placed on dysphagia 1 die with honey thickened liquids along with bolus feeds

## 2018-12-31 NOTE — PROGRESS NOTE ADULT - PROBLEM SELECTOR PROBLEM 6
Fever, unspecified fever cause
Fever, unspecified fever cause
Hypertension, unspecified type
Fever, unspecified fever cause
Discharge planning issues
Fever, unspecified fever cause
Hypertension, unspecified type
Hypertension, unspecified type

## 2018-12-31 NOTE — PROGRESS NOTE ADULT - PROBLEM SELECTOR PROBLEM 7
Hypertension, unspecified type
Hypertension, unspecified type
Discharge planning issues
Hypertension, unspecified type
Discharge planning issues
Hypertension, unspecified type
Worsening renal function

## 2018-12-31 NOTE — PROGRESS NOTE ADULT - PROBLEM SELECTOR PROBLEM 8
Worsening renal function
Worsening renal function
Swelling of extremity, left
Worsening renal function
Discharge planning issues
Swelling of extremity, left
Worsening renal function

## 2018-12-31 NOTE — PROGRESS NOTE ADULT - PROBLEM SELECTOR PLAN 4
patient with EF=35%, global dysfunction, unclear etiology.  -currently compensated.  -will need to follow up with a cardiologist upon discharge to be placed on goal-directed therapy.    -off ARB for now given JAVIER, can resume as outpatient  -if BP remains stable will start low dose coreg to be titrated as outpatient.
patient with EF=35%, global dysfunction, unclear etiology.  -currently compensated.  -will need to follow up with a cardiologist upon discharge to be placed on goal-directed therapy.    -off ARB for now given JAVIER, can resume as outpatietn   -if BP remains stable will start low dose coreg to be titrated as outpatient.
patient with EF=35%, global dysfunction, unclear etiology.  -currently compensated.  -will need to follow up with a cardiologist upon discharge to be placed on goal-directed therapy.
continue with current therapy at this time
patient with EF=35%, global dysfunction, unclear etiology.  -currently compensated.  -will need to follow up with a cardiologist upon discharge to be placed on goal-directed therapy.    -off ARB for now given JAVIER, can resume as outpatietn   -if BP remains stable will start low dose coreg to be titrated as outpatient.
PEG with bolus feeds in place
continue with current therapy at this time
continue with current therapy at this time, BP currently acceptable
losartan stopped as was HCTZ 12/19
patient with EF=35%, global dysfunction, unclear etiology.  -currently compensated.  -will need to follow up with a cardiologist upon discharge to be placed on goal-directed therapy.
patient with EF=35%, global dysfunction, unclear etiology.  -currently compensated.  -will need to follow up with a cardiologist upon discharge to be placed on goal-directed therapy.    -off ARB for now given JAVIER, can resume as outpatient  -if BP remains stable will start low dose coreg to be titrated as outpatient.
patient with EF=35%, global dysfunction, unclear etiology.  -currently compensated.  -will need to follow up with a cardiologist upon discharge to be placed on goal-directed therapy.    -off ARB for now given JAVIER, can resume as outpatient  -if BP remains stable will start low dose coreg to be titrated as outpatient.
patient with EF=35%, global dysfunction, unclear etiology.  -currently compensated.  -will need to follow up with a cardiologist upon discharge to be placed on goal-directed therapy.    -off ARB for now given JAVIER, can resume as outpatietn   -if BP remains stable will start low dose coreg to be titrated as outpatient.
continue with current therapy at this time, BP currently acceptable
continue with current therapy at this time, BP currently acceptable

## 2018-12-31 NOTE — PROGRESS NOTE ADULT - PROBLEM SELECTOR PROBLEM 2
Airway obstruction, anatomic
Airway obstruction, anatomic
Diffuse large B-cell lymphoma of lymph nodes of neck
Airway obstruction, anatomic
Diffuse large B-cell lymphoma of lymph nodes of neck
Hypernatremia
Hypernatremia
Airway obstruction, anatomic
Airway obstruction, anatomic
Diffuse large B-cell lymphoma of lymph nodes of neck

## 2018-12-31 NOTE — PROGRESS NOTE ADULT - PROBLEM SELECTOR PLAN 8
stable
stable
improved w/ hot packs   sono pending
slightly increased likely ins etting of acute anemia, continue to monitor.
improved w/ hot packs   sono neg
pt has to go back to his own apt not his girlfriend's apt on d/c  -social work aware, patient owns apartment, son is going to live with patient.
stable

## 2018-12-31 NOTE — PROGRESS NOTE ADULT - PROBLEM SELECTOR PLAN 2
-appreciate heme recs, etoposide/cyclophosphamide until 12/23.   -outpatient follow up  monitor ANC- to restart Zarxio after d/w heme

## 2018-12-31 NOTE — PROGRESS NOTE ADULT - PROBLEM SELECTOR PROBLEM 3
Localized swelling, mass and lump, head
Tonsil cancer
Airway obstruction, anatomic
Localized swelling, mass and lump, head
Systolic heart failure, unspecified HF chronicity
Localized swelling, mass and lump, head
Localized swelling, mass and lump, head
Airway obstruction, anatomic

## 2018-12-31 NOTE — PROGRESS NOTE ADULT - PROVIDER SPECIALTY LIST ADULT
Anesthesia
ENT
Gastroenterology
Heme/Onc
Hospitalist
Nephrology
SICU
Thoracic Surgery
Thoracic Surgery
ENT
ENT
Heme/Onc
ENT
Hospitalist

## 2018-12-31 NOTE — PROGRESS NOTE ADULT - SUBJECTIVE AND OBJECTIVE BOX
Dr. Rodríguez pager [phone]    Patient is a 68y old  Male who presents with a chief complaint of respiratory failure (30 Dec 2018 16:54)      SUBJECTIVE / OVERNIGHT EVENTS: pt seen at 1030am- felt fine- not lightheaded or dizzy or weak.   was able to walk in the hallway.  pt was angry that we are speaking to his son - "He doesn't make decisions for me"  writer spoke w/ pt's son over the phone at [phone]- he was upset about the pt being pushed out of the hospital- pt is AAOX3 he has been wanting to go home for a few days- pt wishes to go home to his girlfriend's house tonight  agrees to get 1 unit of prbc and will leave tonight  discussed that all meds will be given orally.  he is taking a pureed diet.  will hold off on enteral feeds for now    MEDICATIONS  (STANDING):  allopurinol 300 milliGRAM(s) Oral daily  carvedilol 3.125 milliGRAM(s) Oral every 12 hours  dextrose 5%. 1000 milliLiter(s) (50 mL/Hr) IV Continuous <Continuous>  dextrose 50% Injectable 12.5 Gram(s) IV Push once  dextrose 50% Injectable 25 Gram(s) IV Push once  dextrose 50% Injectable 25 Gram(s) IV Push once  docusate sodium Liquid 100 milliGRAM(s) Oral two times a day  insulin lispro (HumaLOG) corrective regimen sliding scale   SubCutaneous three times a day before meals  insulin lispro (HumaLOG) corrective regimen sliding scale   SubCutaneous at bedtime  pantoprazole   Suspension 40 milliGRAM(s) Oral daily  senna Syrup 10 milliLiter(s) Oral at bedtime    MEDICATIONS  (PRN):  acetaminophen    Suspension .. 650 milliGRAM(s) Oral every 6 hours PRN Mild Pain (1 - 3)  dextrose 40% Gel 15 Gram(s) Oral once PRN Blood Glucose LESS THAN 70 milliGRAM(s)/deciliter  diphenhydrAMINE   Injectable 25 milliGRAM(s) IV Push once PRN REACTION  glucagon  Injectable 1 milliGRAM(s) IntraMuscular once PRN Glucose LESS THAN 70 milligrams/deciliter  hydrocortisone sodium succinate Injectable 100 milliGRAM(s) IV Push once PRN REACTION  oxyCODONE    Solution 10 milliGRAM(s) Oral every 6 hours PRN Severe Pain (7 - 10)  oxyCODONE    Solution 5 milliGRAM(s) Oral every 6 hours PRN Moderate Pain (4 - 6)      Meds ordered within last 24hours  carvedilol: [Ordered as COREG]  3.125 milliGRAM(s), Oral, every 12 hours  Special Instructions: hold if sbp<100 or if HR<55  Provider's Contact #: [phone] (12-30 @ 17:00)      T(C): 37.3 (12-31-18 @ 14:19), Max: 37.6 (12-31-18 @ 02:35)  HR: 92 (12-31-18 @ 14:19) (57 - 92)  BP: 120/75 (12-31-18 @ 14:19) (100/64 - 140/69)  RR: 18 (12-31-18 @ 14:19) (17 - 18)  SpO2: 100% (12-31-18 @ 14:19) (97% - 100%)    CAPILLARY BLOOD GLUCOSE      POCT Blood Glucose.: 136 mg/dL (31 Dec 2018 12:13)  POCT Blood Glucose.: 113 mg/dL (31 Dec 2018 07:53)  POCT Blood Glucose.: 147 mg/dL (30 Dec 2018 21:18)  POCT Blood Glucose.: 134 mg/dL (30 Dec 2018 17:37)    I&O's Summary    30 Dec 2018 07:01  -  31 Dec 2018 07:00  --------------------------------------------------------  IN: 1260 mL / OUT: 1750 mL / NET: -490 mL    31 Dec 2018 07:01  -  31 Dec 2018 15:24  --------------------------------------------------------  IN: 240 mL / OUT: 200 mL / NET: 40 mL        PHYSICAL EXAM:  GENERAL: NAD  CHEST/LUNG: Clear to auscultation bilaterally; No wheeze  HEART: Regular rate and rhythm; No murmurs, rubs, or gallops  ABDOMEN: Soft, Nontender, Nondistended; Bowel sounds present  EXTREMITIES:  No clubbing, cyanosis, or edema        LABS:                        7.1    2.17  )-----------( 132      ( 31 Dec 2018 06:50 )             20.4     12-31    136  |  99  |  10  ----------------------------<  130<H>  3.9   |  27  |  1.02    Ca    9.2      31 Dec 2018 06:50  Phos  3.0     12-31  Mg     1.8     12-31    TPro  6.5  /  Alb  3.4  /  TBili  0.3  /  DBili  x   /  AST  7   /  ALT  10  /  AlkPhos  69  12-31              RADIOLOGY & ADDITIONAL TESTS:    Imaging Personally Reviewed:    Consultant(s) Notes Reviewed:      Care Discussed with Consultants/Other Providers:

## 2018-12-31 NOTE — PROGRESS NOTE ADULT - PROBLEM SELECTOR PLAN 1
decrease in hgb  will give 1 unit prbc today   pt is insisting on leaving today- "feels fine"    he said he can make his own decisions and will leave today

## 2018-12-31 NOTE — PROGRESS NOTE ADULT - PROBLEM SELECTOR PROBLEM 5
Dysphagia, unspecified type
Fever, unspecified fever cause
Worsening renal function
Worsening renal function

## 2019-01-01 RX ORDER — OXYCODONE HYDROCHLORIDE 5 MG/1
10 TABLET ORAL
Qty: 280 | Refills: 0
Start: 2019-01-01 | End: 2019-01-07

## 2019-01-02 ENCOUNTER — APPOINTMENT (OUTPATIENT)
Dept: HEMATOLOGY ONCOLOGY | Facility: CLINIC | Age: 69
End: 2019-01-02

## 2019-01-02 ENCOUNTER — INBOUND DOCUMENT (OUTPATIENT)
Age: 69
End: 2019-01-02

## 2019-01-04 ENCOUNTER — APPOINTMENT (OUTPATIENT)
Dept: HEMATOLOGY ONCOLOGY | Facility: CLINIC | Age: 69
End: 2019-01-04

## 2019-01-16 ENCOUNTER — RESULT REVIEW (OUTPATIENT)
Age: 69
End: 2019-01-16

## 2019-01-16 ENCOUNTER — APPOINTMENT (OUTPATIENT)
Dept: HEMATOLOGY ONCOLOGY | Facility: CLINIC | Age: 69
End: 2019-01-16
Payer: MEDICARE

## 2019-01-16 ENCOUNTER — APPOINTMENT (OUTPATIENT)
Dept: HEMATOLOGY ONCOLOGY | Facility: CLINIC | Age: 69
End: 2019-01-16

## 2019-01-16 VITALS
WEIGHT: 205.03 LBS | BODY MASS INDEX: 27.81 KG/M2 | HEART RATE: 102 BPM | TEMPERATURE: 98.6 F | RESPIRATION RATE: 16 BRPM | DIASTOLIC BLOOD PRESSURE: 81 MMHG | OXYGEN SATURATION: 100 % | SYSTOLIC BLOOD PRESSURE: 123 MMHG

## 2019-01-16 LAB
BASOPHILS # BLD AUTO: 0.1 K/UL — SIGNIFICANT CHANGE UP (ref 0–0.2)
BASOPHILS NFR BLD AUTO: 1.2 % — SIGNIFICANT CHANGE UP (ref 0–2)
EOSINOPHIL # BLD AUTO: 0.3 K/UL — SIGNIFICANT CHANGE UP (ref 0–0.5)
EOSINOPHIL NFR BLD AUTO: 4.2 % — SIGNIFICANT CHANGE UP (ref 0–6)
HCT VFR BLD CALC: 30.1 % — LOW (ref 39–50)
HGB BLD-MCNC: 10.2 G/DL — LOW (ref 13–17)
LYMPHOCYTES # BLD AUTO: 1.9 K/UL — SIGNIFICANT CHANGE UP (ref 1–3.3)
LYMPHOCYTES # BLD AUTO: 32 % — SIGNIFICANT CHANGE UP (ref 13–44)
MCHC RBC-ENTMCNC: 32 PG — SIGNIFICANT CHANGE UP (ref 27–34)
MCHC RBC-ENTMCNC: 34 G/DL — SIGNIFICANT CHANGE UP (ref 32–36)
MCV RBC AUTO: 94.2 FL — SIGNIFICANT CHANGE UP (ref 80–100)
MONOCYTES # BLD AUTO: 0.6 K/UL — SIGNIFICANT CHANGE UP (ref 0–0.9)
MONOCYTES NFR BLD AUTO: 10.6 % — SIGNIFICANT CHANGE UP (ref 2–14)
NEUTROPHILS # BLD AUTO: 3.1 K/UL — SIGNIFICANT CHANGE UP (ref 1.8–7.4)
NEUTROPHILS NFR BLD AUTO: 52.1 % — SIGNIFICANT CHANGE UP (ref 43–77)
PLATELET # BLD AUTO: 306 K/UL — SIGNIFICANT CHANGE UP (ref 150–400)
RBC # BLD: 3.2 M/UL — LOW (ref 4.2–5.8)
RBC # FLD: 16.9 % — HIGH (ref 10.3–14.5)
WBC # BLD: 6 K/UL — SIGNIFICANT CHANGE UP (ref 3.8–10.5)
WBC # FLD AUTO: 6 K/UL — SIGNIFICANT CHANGE UP (ref 3.8–10.5)

## 2019-01-16 PROCEDURE — 99215 OFFICE O/P EST HI 40 MIN: CPT

## 2019-01-17 ENCOUNTER — OUTPATIENT (OUTPATIENT)
Dept: OUTPATIENT SERVICES | Facility: HOSPITAL | Age: 69
LOS: 1 days | Discharge: HOME | End: 2019-01-17

## 2019-01-17 ENCOUNTER — APPOINTMENT (OUTPATIENT)
Dept: OTOLARYNGOLOGY | Facility: CLINIC | Age: 69
End: 2019-01-17
Payer: MEDICARE

## 2019-01-17 VITALS
HEIGHT: 72 IN | SYSTOLIC BLOOD PRESSURE: 122 MMHG | RESPIRATION RATE: 16 BRPM | HEART RATE: 70 BPM | BODY MASS INDEX: 27.77 KG/M2 | WEIGHT: 205 LBS | DIASTOLIC BLOOD PRESSURE: 76 MMHG | OXYGEN SATURATION: 98 %

## 2019-01-17 DIAGNOSIS — Z98.890 OTHER SPECIFIED POSTPROCEDURAL STATES: Chronic | ICD-10-CM

## 2019-01-17 DIAGNOSIS — S52.90XA UNSPECIFIED FRACTURE OF UNSPECIFIED FOREARM, INITIAL ENCOUNTER FOR CLOSED FRACTURE: Chronic | ICD-10-CM

## 2019-01-17 DIAGNOSIS — C09.0 MALIGNANT NEOPLASM OF TONSILLAR FOSSA: ICD-10-CM

## 2019-01-17 DIAGNOSIS — R22.0 LOCALIZED SWELLING, MASS AND LUMP, HEAD: ICD-10-CM

## 2019-01-17 LAB
ALBUMIN SERPL ELPH-MCNC: 3.6 G/DL
ALP BLD-CCNC: 105 U/L
ALT SERPL-CCNC: 5 U/L
ANION GAP SERPL CALC-SCNC: 11 MMOL/L
AST SERPL-CCNC: 10 U/L
BILIRUB SERPL-MCNC: <0.2 MG/DL
BUN SERPL-MCNC: 6 MG/DL
CALCIUM SERPL-MCNC: 8.5 MG/DL
CHLORIDE SERPL-SCNC: 105 MMOL/L
CO2 SERPL-SCNC: 26 MMOL/L
CREAT SERPL-MCNC: 0.98 MG/DL
GLUCOSE SERPL-MCNC: 90 MG/DL
LDH SERPL-CCNC: 203 U/L
POTASSIUM SERPL-SCNC: 4.1 MMOL/L
PROT SERPL-MCNC: 6.8 G/DL
SODIUM SERPL-SCNC: 142 MMOL/L

## 2019-01-17 PROCEDURE — 99214 OFFICE O/P EST MOD 30 MIN: CPT | Mod: 25

## 2019-01-17 PROCEDURE — 31575 DIAGNOSTIC LARYNGOSCOPY: CPT

## 2019-01-17 NOTE — PHYSICAL EXAM
[Restricted in physically strenuous activity but ambulatory and able to carry out work of a light or sedentary nature] : Status 1- Restricted in physically strenuous activity but ambulatory and able to carry out work of a light or sedentary nature, e.g., light house work, office work [Normal] : affect appropriate [de-identified] : trach [de-identified] : PEG

## 2019-01-17 NOTE — PROCEDURE
[Topical Lidocaine] : topical lidocaine [Oxymetazoline HCl] : oxymetazoline HCl [Flexible Endoscope] : examined with the flexible endoscope [True Vocal Cords Paralysis] : no true vocal cord paralysis [True Vocal Cords Erythematous] : no true vocal cord edema [True Vocal Cords Fang's Nodules] : no true vocal cord nodules [Glottis Arytenoid Cartilages] : no arytenoid granulomas [Glottis Arytenoid Cartilages Erythema] : no arytenoid erythema [Normal] : posterior cricoid area had healthy pink mucosa in the interarytenoid area and the esophageal inlet [Present] : absent

## 2019-01-17 NOTE — HISTORY OF PRESENT ILLNESS
[de-identified] : 69 y/o M with h/o lymphoma, s/p trach and PEG, now on treatment and doing well.  Pt states he is eating without difficulty.  He is no longer having voice issues, and neck swelling is gone.

## 2019-01-17 NOTE — PHYSICAL EXAM
[Midline] : trachea located in midline position [Laryngoscopy Performed] : laryngoscopy was performed, see procedure section for findings [Normal] : no rashes [de-identified] : No adenopathy [de-identified] : OP mass gone [de-identified] : Irriegularly irregular [de-identified] : L [employer]

## 2019-01-17 NOTE — ASSESSMENT
[FreeTextEntry1] : Pt with no evidence of residual lymphoma in the head and neck region. No evidence of airway obstruction.  Trach removed.  Pt observed for 30 minutes without trach and he had no difficulty breathing.  \par \par Pt advised to return to ED if he has any SOB.  \par \par Pt to monitor his weight over the next 2 weeks.  As long as his weight remains stable without PEG feeds he will be considered for PEG removal.

## 2019-01-17 NOTE — CONSULT LETTER
[Dear  ___] : Dear  [unfilled], [Courtesy Letter:] : I had the pleasure of seeing your patient, [unfilled], in my office today. [Please see my note below.] : Please see my note below. [Consult Closing:] : Thank you very much for allowing me to participate in the care of this patient.  If you have any questions, please do not hesitate to contact me. [Sincerely,] : Sincerely, [FreeTextEntry2] : Lizette Hernandez MD (Rick MERINO)\par  [FreeTextEntry3] : Marc Billy MD, FACS\par Clinical \par Dept. of Otolaryngology\par [facility]\par  [DrGamal  ___] : Dr. CONDE

## 2019-01-17 NOTE — HISTORY OF PRESENT ILLNESS
[de-identified] : 69 y/o male with a h/o weight loss of the past few months. Over the past few weeks he has had a lack of appetite and dysphagia with voice changes. THis was associated with throat pain and difficulty breathing. Pt was treated with Clindamycin for a throat infection but it did not help. Pt saw Dr. Cervantes who ordered a CT neck that showed a tonsil mass. He saw Dr. Billy last week who did tonsillar bx in the office which was non-diagnostic. He has since had a PET/CT which showed previously noted large left sided neck mass extending from the left hilum of glossopharyngeal sulcus and left tonsil which is intensely hypermetabolic. This mass crosses midline and causes mass effect upon airway causing some deviation. It extends inferiorly to subglottic area . There is b/l hypermetabolic LAD with markedly enlarged level 2 nodes on left. There were also findings in the abdomen concerning for metastatic LN. The patient now has progressively protruberant tongue and prolonged periods of "sleep apnea". He c/o severe throat pain and left otalgia. He denies dysphagia, cough, or any other symptoms.  [de-identified] : He presented to the ED 12/4/18 with SOB. At that time flexible laryngoscopy showed patent airway but inability to tolerate secretions with pooling of secretions in the hypopharynx and suspected aspiration. Awake nasal fiberoptic intubation was attempted by the anesthesia team in the ED however without success and the patient was then brought urgently to the OR for fiberoptic intubation, tracheotomy, direct laryngoscopy, biopsy on 12/4.  Pathology showed DLBCL GCB subtype. FISH negative for BCL6, MYC, MYC/IGH and IGH/BLC2 translocation. Pt also had an excisional LN bx on 12/15. CT C/A/P with indeterminate adrenal nodules only, hep panel negative, TTE with EF of 35%\par s/p BM biopsy on 12/19/18 - no involvement by lymphoma\par Started on R-CEOP with Rituxan on 12/20 and CEOP starting 12/21. He received Neupogen afterwards. Tolerated treatment well. \par He had melanotic stools overnight on 12/21 with drop in hemoglobin of about 3 gm. s/p EGD - gastric ulcers x 2 in gastric body. Biopsies showed chronic gastritis with necrotic fragment includes large atypical cells and abundant fungal spores and bacterial forms.  IHC was performed for CD3, CD79a, CD20 and PAX5; however, due to the necrosis, the results are difficult to interpret. Given the history of large cell lymphoma, involvement of this tissue by a lymphoproliferative process cannot be excluded.\par Breathing and swallowing improved significantly since starting chemotherapy. Speech swallow evaluation 12/26 - pureed diet, hold tube feeds on d/c. Per ENT, will wait until after chemotherapy treatment is completed before decannulation.\par \par Since he has returned home, he reports issues with his trach care - stating that he does not have supplies and that no one is helping him clean and maintain it. Also reports having pain for which he ran out of pain medicine and is now unable to take his pills. He saw his PMD a couple days ago and had pain meds prescribed in liquid form but he has not received anything yet. He has not been back to see ENT.

## 2019-01-17 NOTE — ASSESSMENT
[FreeTextEntry1] : 67yo M w/ newly diagnosed DLBCL. He is s/p trach/PEG when he presented to [facility] with SOB from large oropharyngeal mass.\par He is s/p C1 R-CEOP with Rituxan on 12/20 and CEOP starting 12/21. He received Neupogen afterwards. His mass and cervical adenopathy has improved significantly.\par \par Pt comes in today with multiple complaints about his trach care and supplies. Please refer to SW note from 1/15. I also spoke with Aleida [phone] who directed me to Shaniqua Lai (nurse supervisor) [phone] from [facility] - she also expressed to me that the pt does not open the door when the visiting nurse attempted to follow up, that he was uncooperative with staff, and APS referral was made by their SW on 1/16. He was discharged from home care b/c of this. Called for appt w/ Dr. Billy - the office will reach out to patient. If pt does not need trach, maybe decannulation would be an option at this time. \par We discussed continuing chemotherapy and pt is willing to start whenever we can get him in for treatment next week. SW Sharon Lerman, who was also present during this visit, will help set up transportation and will call him once appts are established. \par We will also need to have PEG removed sometime in the future as he is able to swallow\par RTC 2 weeks\par \par

## 2019-01-22 ENCOUNTER — EMERGENCY (EMERGENCY)
Facility: HOSPITAL | Age: 69
LOS: 1 days | Discharge: HOME | End: 2019-01-22
Attending: EMERGENCY MEDICINE | Admitting: EMERGENCY MEDICINE
Payer: MEDICARE

## 2019-01-22 VITALS
TEMPERATURE: 89 F | OXYGEN SATURATION: 100 % | HEART RATE: 90 BPM | RESPIRATION RATE: 16 BRPM | DIASTOLIC BLOOD PRESSURE: 83 MMHG | SYSTOLIC BLOOD PRESSURE: 145 MMHG

## 2019-01-22 VITALS — TEMPERATURE: 96 F

## 2019-01-22 DIAGNOSIS — Z98.890 OTHER SPECIFIED POSTPROCEDURAL STATES: Chronic | ICD-10-CM

## 2019-01-22 DIAGNOSIS — S52.90XA UNSPECIFIED FRACTURE OF UNSPECIFIED FOREARM, INITIAL ENCOUNTER FOR CLOSED FRACTURE: Chronic | ICD-10-CM

## 2019-01-22 PROCEDURE — 99283 EMERGENCY DEPT VISIT LOW MDM: CPT | Mod: GC

## 2019-01-22 NOTE — ED ADULT NURSE NOTE - CHPI ED NUR SYMPTOMS NEG
no vomiting/no dizziness/no fever/no decreased eating/drinking/no nausea/no weakness/no chills/no tingling/no pain

## 2019-01-22 NOTE — ED PROVIDER NOTE - NS ED ROS FT
CONST: no fevers, no chills, no trauma  EYES: no pain, no visual disturbances  ENT: no sore throat, no epistaxis, no rhinorrhea, no hearing changes  CV: no chest pain, no palpitations, no orthopnea, no extremity pain or swelling  RESP: no shortness of breath, no cough, no sputum, no pleurisy, no wheezing  ABD: no abdominal pain, + nausea, + vomiting, no diarrhea, no black or bloody stool  : no dysuria, no hematuria, no frequency, no urgency  MSK: no back pain, no neck pain, no extremity pain  NEURO: no headache, no sensory disturbances, no focal weakness, no dizziness  HEME: no easy bleeding or bruising  SKIN: no diaphoresis, no rash

## 2019-01-22 NOTE — ED PROVIDER NOTE - PHYSICAL EXAMINATION
VITALS: reviewed  GEN: NAD, A & O x 4  HEAD/EYES: NCAT, PERRL, EOMI, anicteric sclerae, no conjunctival pallor  ENT: mucus membranes moist, oropharynx WNL, trachea midline, no JVD  RESP: lungs CTA with equal breath sounds bilaterally, chest wall nontender and atraumatic  CV: heart with reg rhythm S1, S2, no murmur; distal pulses intact and symmetric bilaterally  ABDOMEN: normoactive bowel sounds, soft, nondistended, nontender, no palpable masses, PEG in place with granuloma formation with discharge from area, no abscess.   : no CVAT  MSK: extremities atraumatic and nontender, no edema, no asymmetry. the back is without midline or lateral tenderness, there is no spinal deformity or stepoff and the back is ranged painlessly. the neck has no midline tenderness, deformity, or stepoff, and is ranged painlessly.  SKIN: warm, dry, no rash, no bruising, no cyanosis. color appropriate for ethnicity  NEURO: alert, mentating appropriately, no facial asymmetry. gross sensation, motor, coordination are intact  PSYCH: Affect appropriate

## 2019-01-22 NOTE — ED PROVIDER NOTE - MEDICAL DECISION MAKING DETAILS
69 yo M presenting for PEG removal, pt well appearing, afebrile, no abscess/cellulitis, will c/s GI and ? PEG removal vs dc for removal

## 2019-01-22 NOTE — ED PROVIDER NOTE - OBJECTIVE STATEMENT
67 yo M hx DLBCL s/p trach and peg requesting PEG removal in ED. Patient reports drainage from PEG, with foul odor. Denies fevers/chills. All food intake by mouth. Denies abdominal pain/distention.

## 2019-01-22 NOTE — ED PROVIDER NOTE - NSFOLLOWUPINSTRUCTIONS_ED_ALL_ED_FT
Rest, drink plenty of fluids.  Advance activity as tolerated.  Continue all previously prescribed medications as directed.  Follow up with your primary care physician in 48-72 hours- bring copies of your results.  Return to the ER for worsening or persistent symptoms, and/or ANY NEW OR CONCERNING SYMPTOMS. If you have issues obtaining follow up, please call: [phone] to obtain a doctor or specialist who takes your insurance in your area.    Call gastroenterology to schedule an appointment. Return to ED if you develop fevers, chills, abdominal pain or distention, or if you are unable to tolerate food by mouth.

## 2019-01-22 NOTE — ED ADULT NURSE NOTE - OBJECTIVE STATEMENT
67 y/o male presents to ED requesting for PEG tu 67 y/o male presents to ED requesting for PEG tube removal.  Pt states that he has been calling [facility] and [facility] requesting that his PEG be removed.  Pt states that no one is paying attention to his request so he came to the ED.  Pt states that he has been eating well, gaining weight and feeling good.  Pt states that he has not used the PEG tube in weeks.  Pt states that he is supposed to start radiation therapy next week and wants the tube out, pt states that the PEG hurts him and night and he has been taking oxy to sleep.  Pt denies n/v/d, states that the smell from the tube makes him nauseous.  Pt states that no nurse has visited his house in at least a week.  Pt with old dressing to trach site and PEG tub site, no drainage noted but there is some old crusted material that does not appear to be purulent.  Pt denies fever chills, no n/v/d.  Pt offers no complaints, just requesting PEG tube removal.

## 2019-01-22 NOTE — ED PROVIDER NOTE - ATTENDING CONTRIBUTION TO CARE
I performed a face to face bedside interview with patient regarding history of present illness, review of symptoms and past medical history. I completed an independent physical exam.  I have discussed patient's plan of care.   I agree with note as stated above, having amended the EMR as needed to reflect my findings. I have discussed the assessment and plan of care.  This includes during the time I functioned as the attending physician for this patient.  Attending Contribution to Care: agree with plan of resident.  trach and peg requesting PEG removal in ED. Patient reports drainage from PEG, with foul odor. GI states peg should be removed in office and assisted arranging removal in office.  no signs of cellulitisabscess on physical exam.

## 2019-01-22 NOTE — ED ADULT NURSE NOTE - NSIMPLEMENTINTERV_GEN_ALL_ED
Implemented All Universal Safety Interventions:  [place] to call system. Call bell, personal items and telephone within reach. Instruct patient to call for assistance. Room bathroom lighting operational. Non-slip footwear when patient is off stretcher. Physically safe environment: no spills, clutter or unnecessary equipment. Stretcher in lowest position, wheels locked, appropriate side rails in place.

## 2019-01-22 NOTE — ED PROVIDER NOTE - PROGRESS NOTE DETAILS
Sanjana PGY2: spoke to GI fellow on phone who recommends patient follow up in office to have PEG removed. Dressing changed in ED, will dc with GI fu

## 2019-01-22 NOTE — ED PROVIDER NOTE - NSFOLLOWUPCLINICS_GEN_ALL_ED_FT
[facility]  Gastroenterology  [address] [place] [id number]  Phone: [phone]  Fax:   Follow Up Time:

## 2019-01-22 NOTE — ED ADULT TRIAGE NOTE - CHIEF COMPLAINT QUOTE
Sent from MD to remove PEG tube. Pt. able to eat but had a few episodes of vomiting and abdominal pain. Brown crust noted around PEG opening with foul odor. 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 Scheduled to start chemotherapy.

## 2019-01-24 ENCOUNTER — LABORATORY RESULT (OUTPATIENT)
Age: 69
End: 2019-01-24

## 2019-01-24 ENCOUNTER — APPOINTMENT (OUTPATIENT)
Dept: INFUSION THERAPY | Facility: HOSPITAL | Age: 69
End: 2019-01-24

## 2019-01-24 ENCOUNTER — RESULT REVIEW (OUTPATIENT)
Age: 69
End: 2019-01-24

## 2019-01-24 LAB
BASOPHILS # BLD AUTO: 0.1 K/UL — SIGNIFICANT CHANGE UP (ref 0–0.2)
BASOPHILS NFR BLD AUTO: 0.7 % — SIGNIFICANT CHANGE UP (ref 0–2)
EOSINOPHIL # BLD AUTO: 1.6 K/UL — HIGH (ref 0–0.5)
EOSINOPHIL NFR BLD AUTO: 19.5 % — HIGH (ref 0–6)
HCT VFR BLD CALC: 35 % — LOW (ref 39–50)
HGB BLD-MCNC: 11.5 G/DL — LOW (ref 13–17)
LYMPHOCYTES # BLD AUTO: 1.8 K/UL — SIGNIFICANT CHANGE UP (ref 1–3.3)
LYMPHOCYTES # BLD AUTO: 22.1 % — SIGNIFICANT CHANGE UP (ref 13–44)
MCHC RBC-ENTMCNC: 31.4 PG — SIGNIFICANT CHANGE UP (ref 27–34)
MCHC RBC-ENTMCNC: 33 G/DL — SIGNIFICANT CHANGE UP (ref 32–36)
MCV RBC AUTO: 95.1 FL — SIGNIFICANT CHANGE UP (ref 80–100)
MONOCYTES # BLD AUTO: 0.5 K/UL — SIGNIFICANT CHANGE UP (ref 0–0.9)
MONOCYTES NFR BLD AUTO: 6.7 % — SIGNIFICANT CHANGE UP (ref 2–14)
NEUTROPHILS # BLD AUTO: 4.1 K/UL — SIGNIFICANT CHANGE UP (ref 1.8–7.4)
NEUTROPHILS NFR BLD AUTO: 51 % — SIGNIFICANT CHANGE UP (ref 43–77)
PLATELET # BLD AUTO: 270 K/UL — SIGNIFICANT CHANGE UP (ref 150–400)
RBC # BLD: 3.68 M/UL — LOW (ref 4.2–5.8)
RBC # FLD: 16.6 % — HIGH (ref 10.3–14.5)
WBC # BLD: 8 K/UL — SIGNIFICANT CHANGE UP (ref 3.8–10.5)
WBC # FLD AUTO: 8 K/UL — SIGNIFICANT CHANGE UP (ref 3.8–10.5)

## 2019-01-25 ENCOUNTER — APPOINTMENT (OUTPATIENT)
Dept: INFUSION THERAPY | Facility: HOSPITAL | Age: 69
End: 2019-01-25

## 2019-01-25 DIAGNOSIS — Z51.11 ENCOUNTER FOR ANTINEOPLASTIC CHEMOTHERAPY: ICD-10-CM

## 2019-01-25 DIAGNOSIS — R11.2 NAUSEA WITH VOMITING, UNSPECIFIED: ICD-10-CM

## 2019-01-26 ENCOUNTER — APPOINTMENT (OUTPATIENT)
Dept: INFUSION THERAPY | Facility: HOSPITAL | Age: 69
End: 2019-01-26

## 2019-01-28 DIAGNOSIS — C83.39 DIFFUSE LARGE B-CELL LYMPHOMA, EXTRANODAL AND SOLID ORGAN SITES: ICD-10-CM

## 2019-01-28 DIAGNOSIS — Z51.89 ENCOUNTER FOR OTHER SPECIFIED AFTERCARE: ICD-10-CM

## 2019-01-29 ENCOUNTER — APPOINTMENT (OUTPATIENT)
Dept: GASTROENTEROLOGY | Facility: CLINIC | Age: 69
End: 2019-01-29
Payer: MEDICARE

## 2019-01-29 VITALS
HEIGHT: 75.5 IN | HEART RATE: 93 BPM | SYSTOLIC BLOOD PRESSURE: 125 MMHG | WEIGHT: 201 LBS | TEMPERATURE: 98.1 F | RESPIRATION RATE: 17 BRPM | DIASTOLIC BLOOD PRESSURE: 70 MMHG | BODY MASS INDEX: 24.73 KG/M2 | OXYGEN SATURATION: 98 %

## 2019-01-29 DIAGNOSIS — J45.909 UNSPECIFIED ASTHMA, UNCOMPLICATED: ICD-10-CM

## 2019-01-29 DIAGNOSIS — R13.10 DYSPHAGIA, UNSPECIFIED: ICD-10-CM

## 2019-01-29 PROCEDURE — 99214 OFFICE O/P EST MOD 30 MIN: CPT

## 2019-01-29 NOTE — PHYSICAL EXAM
[General Appearance - Alert] : alert [General Appearance - In No Acute Distress] : in no acute distress [Sclera] : the sclera and conjunctiva were normal [Bowel Sounds] : normal bowel sounds [Abdomen Soft] : soft [Abdomen Tenderness] : non-tender [] : no hepato-splenomegaly [Abdomen Mass (___ Cm)] : no abdominal mass palpated [Abnormal Walk] : normal gait [Affect] : the affect was normal [FreeTextEntry1] : No confusion

## 2019-01-29 NOTE — HISTORY OF PRESENT ILLNESS
[FreeTextEntry1] : Patient referred for PEG removal\par \par Placed by thoracic surgeon Dr. Sam Rice on 12/7/18 during hospitalization for workup of tonsillar mass, biopsy demonstrated diffuse B cell lymphoma.  On chemotherapy, including sterioids.\par No fever, abd pain, periPEG bleeding/infection/leakage.\par Tolerating adequate nutrition / hydration by mouth for at least several weeks.\par Went to [facility] ED for G-tube removal, which is arranged today.\par \par

## 2019-02-04 ENCOUNTER — RESULT REVIEW (OUTPATIENT)
Age: 69
End: 2019-02-04

## 2019-02-04 ENCOUNTER — APPOINTMENT (OUTPATIENT)
Dept: HEMATOLOGY ONCOLOGY | Facility: CLINIC | Age: 69
End: 2019-02-04
Payer: MEDICARE

## 2019-02-04 VITALS
SYSTOLIC BLOOD PRESSURE: 97 MMHG | OXYGEN SATURATION: 100 % | BODY MASS INDEX: 24.47 KG/M2 | HEART RATE: 93 BPM | WEIGHT: 198.39 LBS | RESPIRATION RATE: 16 BRPM | DIASTOLIC BLOOD PRESSURE: 65 MMHG | TEMPERATURE: 98.4 F

## 2019-02-04 PROCEDURE — 99214 OFFICE O/P EST MOD 30 MIN: CPT

## 2019-02-05 LAB
ALBUMIN SERPL ELPH-MCNC: 3.9 G/DL
ALP BLD-CCNC: 70 U/L
ALT SERPL-CCNC: 7 U/L
ANION GAP SERPL CALC-SCNC: 13 MMOL/L
AST SERPL-CCNC: 10 U/L
BILIRUB SERPL-MCNC: 0.3 MG/DL
BUN SERPL-MCNC: 19 MG/DL
CALCIUM SERPL-MCNC: 8.9 MG/DL
CHLORIDE SERPL-SCNC: 101 MMOL/L
CO2 SERPL-SCNC: 26 MMOL/L
CREAT SERPL-MCNC: 1.62 MG/DL
GLUCOSE SERPL-MCNC: 124 MG/DL
LDH SERPL-CCNC: 178 U/L
POTASSIUM SERPL-SCNC: 3.8 MMOL/L
PROT SERPL-MCNC: 6.3 G/DL
SODIUM SERPL-SCNC: 140 MMOL/L

## 2019-02-07 NOTE — PHYSICAL EXAM
[Restricted in physically strenuous activity but ambulatory and able to carry out work of a light or sedentary nature] : Status 1- Restricted in physically strenuous activity but ambulatory and able to carry out work of a light or sedentary nature, e.g., light house work, office work [Normal] : affect appropriate [de-identified] : trach site covered by gauze [de-identified] : PEG site healed

## 2019-02-07 NOTE — ASSESSMENT
[FreeTextEntry1] : 67yo M w/ newly diagnosed DLBCL. He is s/p trach/PEG when he presented to [facility] with SOB from large oropharyngeal mass.\par He is s/p C1 R-CEOP with Rituxan on 12/20 and CEOP starting 12/21. He received Neupogen afterwards. His mass and cervical adenopathy has improved significantly. \par His trach and PEG has now been removed\par \par -C3 R-CEOP on 2/13. Will have him come in for Neulasta on day 4 since he was unable to keep Onpro on with cycle 2. \par -SW met with pt\par -RTC 3 weeks\par -all questions answered

## 2019-02-07 NOTE — HISTORY OF PRESENT ILLNESS
[de-identified] : 67 y/o male with a h/o weight loss of the past few months. Over the past few weeks he has had a lack of appetite and dysphagia with voice changes. THis was associated with throat pain and difficulty breathing. Pt was treated with Clindamycin for a throat infection but it did not help. Pt saw Dr. Cervantes who ordered a CT neck that showed a tonsil mass. He saw Dr. Billy who did tonsillar bx in the office which was non-diagnostic. He has since had a PET/CT which showed previously noted large left sided neck mass extending from the left hilum of glossopharyngeal sulcus and left tonsil which is intensely hypermetabolic. This mass crosses midline and causes mass effect upon airway causing some deviation. It extends inferiorly to subglottic area . There is b/l hypermetabolic LAD with markedly enlarged level 2 nodes on left. There were also findings in the abdomen concerning for metastatic LN. The patient now has progressively protruberant tongue and prolonged periods of "sleep apnea". He c/o severe throat pain and left otalgia. He denies dysphagia, cough, or any other symptoms. \par \par He presented to the ED 12/4/18 with SOB. At that time flexible laryngoscopy showed patent airway but inability to tolerate secretions with pooling of secretions in the hypopharynx and suspected aspiration. Awake nasal fiberoptic intubation was attempted by the anesthesia team in the ED however without success and the patient was then brought urgently to the OR for fiberoptic intubation, tracheotomy, direct laryngoscopy, biopsy on 12/4.  Pathology showed DLBCL GCB subtype. FISH negative for BCL6, MYC, MYC/IGH and IGH/BLC2 translocation. Pt also had an excisional LN bx on 12/15. CT C/A/P with indeterminate adrenal nodules only, hep panel negative, TTE with EF of 35%\par s/p BM biopsy on 12/19/18 - no involvement by lymphoma\par Started on R-CEOP with Rituxan on 12/20 and CEOP starting 12/21. He received Neupogen afterwards. Tolerated treatment well. \par He had melanotic stools overnight on 12/21 with drop in hemoglobin of about 3 gm. s/p EGD - gastric ulcers x 2 in gastric body. Biopsies showed chronic gastritis with necrotic fragment includes large atypical cells and abundant fungal spores and bacterial forms.  IHC was performed for CD3, CD79a, CD20 and PAX5; however, due to the necrosis, the results are difficult to interpret. Given the history of large cell lymphoma, involvement of this tissue by a lymphoproliferative process cannot be excluded.\par Breathing and swallowing improved significantly since starting chemotherapy. Speech swallow evaluation 12/26 - pureed diet, hold tube feeds on d/c. Per ENT, will wait until after chemotherapy treatment is completed before decannulation. [de-identified] : His trach and PEG has since been removed. \par C2 R-CEOP was given on 1/24/19. Onpro fell off after 2 hours so he did not receive GCSF.\par \par He has been doing well.

## 2019-02-13 ENCOUNTER — RESULT REVIEW (OUTPATIENT)
Age: 69
End: 2019-02-13

## 2019-02-13 ENCOUNTER — APPOINTMENT (OUTPATIENT)
Dept: INFUSION THERAPY | Facility: HOSPITAL | Age: 69
End: 2019-02-13

## 2019-02-13 ENCOUNTER — LABORATORY RESULT (OUTPATIENT)
Age: 69
End: 2019-02-13

## 2019-02-13 LAB
ANISOCYTOSIS BLD QL: SLIGHT — SIGNIFICANT CHANGE UP
BASOPHILS # BLD AUTO: 0 K/UL — SIGNIFICANT CHANGE UP (ref 0–0.2)
BASOPHILS NFR BLD AUTO: 1 % — SIGNIFICANT CHANGE UP (ref 0–2)
ELLIPTOCYTES BLD QL SMEAR: SLIGHT — SIGNIFICANT CHANGE UP
EOSINOPHIL # BLD AUTO: 0 K/UL — SIGNIFICANT CHANGE UP (ref 0–0.5)
EOSINOPHIL NFR BLD AUTO: 4 % — SIGNIFICANT CHANGE UP (ref 0–6)
HCT VFR BLD CALC: 32 % — LOW (ref 39–50)
HGB BLD-MCNC: 10.9 G/DL — LOW (ref 13–17)
LYMPHOCYTES # BLD AUTO: 1.1 K/UL — SIGNIFICANT CHANGE UP (ref 1–3.3)
LYMPHOCYTES # BLD AUTO: 48 % — HIGH (ref 13–44)
MCHC RBC-ENTMCNC: 32.3 PG — SIGNIFICANT CHANGE UP (ref 27–34)
MCHC RBC-ENTMCNC: 34.2 G/DL — SIGNIFICANT CHANGE UP (ref 32–36)
MCV RBC AUTO: 94.4 FL — SIGNIFICANT CHANGE UP (ref 80–100)
MONOCYTES # BLD AUTO: 0.7 K/UL — SIGNIFICANT CHANGE UP (ref 0–0.9)
MONOCYTES NFR BLD AUTO: 12 % — SIGNIFICANT CHANGE UP (ref 2–14)
NEUTROPHILS # BLD AUTO: 1.3 K/UL — LOW (ref 1.8–7.4)
NEUTROPHILS NFR BLD AUTO: 34 % — LOW (ref 43–77)
NEUTS BAND # BLD: 1 % — SIGNIFICANT CHANGE UP (ref 0–8)
OVALOCYTES BLD QL SMEAR: SLIGHT — SIGNIFICANT CHANGE UP
PLAT MORPH BLD: NORMAL — SIGNIFICANT CHANGE UP
PLATELET # BLD AUTO: 319 K/UL — SIGNIFICANT CHANGE UP (ref 150–400)
POIKILOCYTOSIS BLD QL AUTO: SLIGHT — SIGNIFICANT CHANGE UP
POLYCHROMASIA BLD QL SMEAR: SLIGHT — SIGNIFICANT CHANGE UP
RBC # BLD: 3.39 M/UL — LOW (ref 4.2–5.8)
RBC # FLD: 16 % — HIGH (ref 10.3–14.5)
RBC BLD AUTO: ABNORMAL
SCHISTOCYTES BLD QL AUTO: SLIGHT — SIGNIFICANT CHANGE UP
WBC # BLD: 3.1 K/UL — LOW (ref 3.8–10.5)
WBC # FLD AUTO: 3.1 K/UL — LOW (ref 3.8–10.5)

## 2019-02-14 ENCOUNTER — APPOINTMENT (OUTPATIENT)
Dept: INFUSION THERAPY | Facility: HOSPITAL | Age: 69
End: 2019-02-14

## 2019-02-15 ENCOUNTER — APPOINTMENT (OUTPATIENT)
Dept: INFUSION THERAPY | Facility: HOSPITAL | Age: 69
End: 2019-02-15

## 2019-02-16 ENCOUNTER — APPOINTMENT (OUTPATIENT)
Dept: INFUSION THERAPY | Facility: HOSPITAL | Age: 69
End: 2019-02-16

## 2019-02-19 ENCOUNTER — OUTPATIENT (OUTPATIENT)
Dept: OUTPATIENT SERVICES | Facility: HOSPITAL | Age: 69
LOS: 1 days | Discharge: HOME | End: 2019-02-19

## 2019-02-19 DIAGNOSIS — S52.90XA UNSPECIFIED FRACTURE OF UNSPECIFIED FOREARM, INITIAL ENCOUNTER FOR CLOSED FRACTURE: Chronic | ICD-10-CM

## 2019-02-19 DIAGNOSIS — Z98.890 OTHER SPECIFIED POSTPROCEDURAL STATES: Chronic | ICD-10-CM

## 2019-02-19 DIAGNOSIS — C83.39 DIFFUSE LARGE B-CELL LYMPHOMA, EXTRANODAL AND SOLID ORGAN SITES: ICD-10-CM

## 2019-02-21 ENCOUNTER — INBOUND DOCUMENT (OUTPATIENT)
Age: 69
End: 2019-02-21

## 2019-02-25 ENCOUNTER — RESULT REVIEW (OUTPATIENT)
Age: 69
End: 2019-02-25

## 2019-02-25 ENCOUNTER — APPOINTMENT (OUTPATIENT)
Dept: HEMATOLOGY ONCOLOGY | Facility: CLINIC | Age: 69
End: 2019-02-25
Payer: MEDICARE

## 2019-02-25 VITALS
RESPIRATION RATE: 16 BRPM | BODY MASS INDEX: 23.93 KG/M2 | OXYGEN SATURATION: 100 % | SYSTOLIC BLOOD PRESSURE: 117 MMHG | DIASTOLIC BLOOD PRESSURE: 66 MMHG | HEART RATE: 81 BPM | WEIGHT: 194 LBS | TEMPERATURE: 97.9 F

## 2019-02-25 LAB
BASOPHILS # BLD AUTO: 0 K/UL — SIGNIFICANT CHANGE UP (ref 0–0.2)
BASOPHILS NFR BLD AUTO: 0.2 % — SIGNIFICANT CHANGE UP (ref 0–2)
EOSINOPHIL # BLD AUTO: 0 K/UL — SIGNIFICANT CHANGE UP (ref 0–0.5)
EOSINOPHIL NFR BLD AUTO: 0 % — SIGNIFICANT CHANGE UP (ref 0–6)
HCT VFR BLD CALC: 29.5 % — LOW (ref 39–50)
HGB BLD-MCNC: 9.8 G/DL — LOW (ref 13–17)
LYMPHOCYTES # BLD AUTO: 1.5 K/UL — SIGNIFICANT CHANGE UP (ref 1–3.3)
LYMPHOCYTES # BLD AUTO: 15.4 % — SIGNIFICANT CHANGE UP (ref 13–44)
MCHC RBC-ENTMCNC: 31.2 PG — SIGNIFICANT CHANGE UP (ref 27–34)
MCHC RBC-ENTMCNC: 33.1 G/DL — SIGNIFICANT CHANGE UP (ref 32–36)
MCV RBC AUTO: 94.3 FL — SIGNIFICANT CHANGE UP (ref 80–100)
MONOCYTES # BLD AUTO: 1.3 K/UL — HIGH (ref 0–0.9)
MONOCYTES NFR BLD AUTO: 13 % — SIGNIFICANT CHANGE UP (ref 2–14)
NEUTROPHILS # BLD AUTO: 7.1 K/UL — SIGNIFICANT CHANGE UP (ref 1.8–7.4)
NEUTROPHILS NFR BLD AUTO: 71.4 % — SIGNIFICANT CHANGE UP (ref 43–77)
PLATELET # BLD AUTO: 127 K/UL — LOW (ref 150–400)
RBC # BLD: 3.13 M/UL — LOW (ref 4.2–5.8)
RBC # FLD: 16.2 % — HIGH (ref 10.3–14.5)
WBC # BLD: 9.9 K/UL — SIGNIFICANT CHANGE UP (ref 3.8–10.5)
WBC # FLD AUTO: 9.9 K/UL — SIGNIFICANT CHANGE UP (ref 3.8–10.5)

## 2019-02-25 PROCEDURE — 99214 OFFICE O/P EST MOD 30 MIN: CPT

## 2019-02-25 NOTE — PHYSICAL EXAM
[Restricted in physically strenuous activity but ambulatory and able to carry out work of a light or sedentary nature] : Status 1- Restricted in physically strenuous activity but ambulatory and able to carry out work of a light or sedentary nature, e.g., light house work, office work [Normal] : affect appropriate [de-identified] : PEG site healed

## 2019-02-25 NOTE — ASSESSMENT
[FreeTextEntry1] : 67yo M w/ newly diagnosed DLBCL. He is s/p trach/PEG when he presented to [facility] with SOB from large oropharyngeal mass.\par He is s/p C1 R-CEOP with Rituxan on 12/20 and CEOP starting 12/21. He received Neupogen afterwards. His mass and cervical adenopathy has improved significantly. \par His trach and PEG has now been removed\par \par -C4 R-CEOP on 3/13. Will have him come in for Neulasta on day 4 since he was unable to keep Onpro on with cycle 2. \par -order for PET/CT today for interim assessment\par -RTC 3 weeks\par -all questions answered

## 2019-02-25 NOTE — HISTORY OF PRESENT ILLNESS
[de-identified] : 67 y/o male with a h/o weight loss of the past few months. Over the past few weeks he has had a lack of appetite and dysphagia with voice changes. THis was associated with throat pain and difficulty breathing. Pt was treated with Clindamycin for a throat infection but it did not help. Pt saw Dr. Cervantes who ordered a CT neck that showed a tonsil mass. He saw Dr. Billy who did tonsillar bx in the office which was non-diagnostic. He has since had a PET/CT which showed previously noted large left sided neck mass extending from the left hilum of glossopharyngeal sulcus and left tonsil which is intensely hypermetabolic. This mass crosses midline and causes mass effect upon airway causing some deviation. It extends inferiorly to subglottic area . There is b/l hypermetabolic LAD with markedly enlarged level 2 nodes on left. There were also findings in the abdomen concerning for metastatic LN. The patient now has progressively protruberant tongue and prolonged periods of "sleep apnea". He c/o severe throat pain and left otalgia. He denies dysphagia, cough, or any other symptoms. \par \par He presented to the ED 12/4/18 with SOB. At that time flexible laryngoscopy showed patent airway but inability to tolerate secretions with pooling of secretions in the hypopharynx and suspected aspiration. Awake nasal fiberoptic intubation was attempted by the anesthesia team in the ED however without success and the patient was then brought urgently to the OR for fiberoptic intubation, tracheotomy, direct laryngoscopy, biopsy on 12/4.  Pathology showed DLBCL GCB subtype. FISH negative for BCL6, MYC, MYC/IGH and IGH/BLC2 translocation. Pt also had an excisional LN bx on 12/15. CT C/A/P with indeterminate adrenal nodules only, hep panel negative, TTE with EF of 35%\par s/p BM biopsy on 12/19/18 - no involvement by lymphoma\par Started on R-CEOP with Rituxan on 12/20 and CEOP starting 12/21. He received Neupogen afterwards. Tolerated treatment well. \par He had melanotic stools overnight on 12/21 with drop in hemoglobin of about 3 gm. s/p EGD - gastric ulcers x 2 in gastric body. Biopsies showed chronic gastritis with necrotic fragment includes large atypical cells and abundant fungal spores and bacterial forms.  IHC was performed for CD3, CD79a, CD20 and PAX5; however, due to the necrosis, the results are difficult to interpret. Given the history of large cell lymphoma, involvement of this tissue by a lymphoproliferative process cannot be excluded.\par Breathing and swallowing improved significantly since starting chemotherapy. Speech swallow evaluation 12/26 - pureed diet, hold tube feeds on d/c. Per ENT, will wait until after chemotherapy treatment is completed before decannulation. [de-identified] : His trach and PEG has since been removed. \par C2 R-CEOP was given on 1/24/19. Onpro fell off after 2 hours so he did not receive GCSF.\par C3 was on 2/13/19. Neulasta on 2/16. \par He has been doing well. Notes having pain with swallowing for a few days last week, now improved.

## 2019-02-27 LAB
ALBUMIN SERPL ELPH-MCNC: 4 G/DL
ALP BLD-CCNC: 98 U/L
ALT SERPL-CCNC: 6 U/L
ANION GAP SERPL CALC-SCNC: 15 MMOL/L
AST SERPL-CCNC: 8 U/L
BILIRUB SERPL-MCNC: 0.2 MG/DL
BUN SERPL-MCNC: 14 MG/DL
CALCIUM SERPL-MCNC: 8.9 MG/DL
CHLORIDE SERPL-SCNC: 103 MMOL/L
CO2 SERPL-SCNC: 23 MMOL/L
CREAT SERPL-MCNC: 1.31 MG/DL
GLUCOSE SERPL-MCNC: 103 MG/DL
LDH SERPL-CCNC: 222 U/L
POTASSIUM SERPL-SCNC: 4.4 MMOL/L
PROT SERPL-MCNC: 6.7 G/DL
SODIUM SERPL-SCNC: 141 MMOL/L

## 2019-03-04 ENCOUNTER — MESSAGE (OUTPATIENT)
Age: 69
End: 2019-03-04

## 2019-03-06 ENCOUNTER — LABORATORY RESULT (OUTPATIENT)
Age: 69
End: 2019-03-06

## 2019-03-06 ENCOUNTER — APPOINTMENT (OUTPATIENT)
Dept: INFUSION THERAPY | Facility: HOSPITAL | Age: 69
End: 2019-03-06

## 2019-03-06 ENCOUNTER — RESULT REVIEW (OUTPATIENT)
Age: 69
End: 2019-03-06

## 2019-03-06 LAB
BASOPHILS # BLD AUTO: 0 K/UL — SIGNIFICANT CHANGE UP (ref 0–0.2)
BASOPHILS NFR BLD AUTO: 0.6 % — SIGNIFICANT CHANGE UP (ref 0–2)
EOSINOPHIL # BLD AUTO: 0.1 K/UL — SIGNIFICANT CHANGE UP (ref 0–0.5)
EOSINOPHIL NFR BLD AUTO: 1.9 % — SIGNIFICANT CHANGE UP (ref 0–6)
HCT VFR BLD CALC: 29.6 % — LOW (ref 39–50)
HGB BLD-MCNC: 10.3 G/DL — LOW (ref 13–17)
LYMPHOCYTES # BLD AUTO: 0.9 K/UL — LOW (ref 1–3.3)
LYMPHOCYTES # BLD AUTO: 12.3 % — LOW (ref 13–44)
MCHC RBC-ENTMCNC: 32.4 PG — SIGNIFICANT CHANGE UP (ref 27–34)
MCHC RBC-ENTMCNC: 34.8 G/DL — SIGNIFICANT CHANGE UP (ref 32–36)
MCV RBC AUTO: 93.2 FL — SIGNIFICANT CHANGE UP (ref 80–100)
MONOCYTES # BLD AUTO: 1 K/UL — HIGH (ref 0–0.9)
MONOCYTES NFR BLD AUTO: 13.8 % — SIGNIFICANT CHANGE UP (ref 2–14)
NEUTROPHILS # BLD AUTO: 5.4 K/UL — SIGNIFICANT CHANGE UP (ref 1.8–7.4)
NEUTROPHILS NFR BLD AUTO: 71.4 % — SIGNIFICANT CHANGE UP (ref 43–77)
PLATELET # BLD AUTO: 255 K/UL — SIGNIFICANT CHANGE UP (ref 150–400)
RBC # BLD: 3.17 M/UL — LOW (ref 4.2–5.8)
RBC # FLD: 16.3 % — HIGH (ref 10.3–14.5)
WBC # BLD: 7.6 K/UL — SIGNIFICANT CHANGE UP (ref 3.8–10.5)
WBC # FLD AUTO: 7.6 K/UL — SIGNIFICANT CHANGE UP (ref 3.8–10.5)

## 2019-03-07 ENCOUNTER — APPOINTMENT (OUTPATIENT)
Dept: INFUSION THERAPY | Facility: HOSPITAL | Age: 69
End: 2019-03-07

## 2019-03-07 DIAGNOSIS — R11.2 NAUSEA WITH VOMITING, UNSPECIFIED: ICD-10-CM

## 2019-03-07 DIAGNOSIS — Z51.11 ENCOUNTER FOR ANTINEOPLASTIC CHEMOTHERAPY: ICD-10-CM

## 2019-03-08 ENCOUNTER — APPOINTMENT (OUTPATIENT)
Dept: INFUSION THERAPY | Facility: HOSPITAL | Age: 69
End: 2019-03-08

## 2019-03-09 ENCOUNTER — APPOINTMENT (OUTPATIENT)
Dept: INFUSION THERAPY | Facility: HOSPITAL | Age: 69
End: 2019-03-09

## 2019-03-12 DIAGNOSIS — Z51.89 ENCOUNTER FOR OTHER SPECIFIED AFTERCARE: ICD-10-CM

## 2019-03-18 ENCOUNTER — RESULT REVIEW (OUTPATIENT)
Age: 69
End: 2019-03-18

## 2019-03-18 ENCOUNTER — OTHER (OUTPATIENT)
Age: 69
End: 2019-03-18

## 2019-03-18 ENCOUNTER — APPOINTMENT (OUTPATIENT)
Dept: HEMATOLOGY ONCOLOGY | Facility: CLINIC | Age: 69
End: 2019-03-18
Payer: MEDICARE

## 2019-03-18 VITALS
DIASTOLIC BLOOD PRESSURE: 76 MMHG | HEART RATE: 75 BPM | OXYGEN SATURATION: 97 % | RESPIRATION RATE: 16 BRPM | SYSTOLIC BLOOD PRESSURE: 111 MMHG | WEIGHT: 190.26 LBS | BODY MASS INDEX: 23.47 KG/M2 | TEMPERATURE: 98.1 F

## 2019-03-18 LAB
EOSINOPHIL # BLD AUTO: 0.1 K/UL — SIGNIFICANT CHANGE UP (ref 0–0.5)
EOSINOPHIL NFR BLD AUTO: 3 % — SIGNIFICANT CHANGE UP (ref 0–6)
HCT VFR BLD CALC: 27.9 % — LOW (ref 39–50)
HGB BLD-MCNC: 9.4 G/DL — LOW (ref 13–17)
LYMPHOCYTES # BLD AUTO: 0.9 K/UL — LOW (ref 1–3.3)
LYMPHOCYTES # BLD AUTO: 16 % — SIGNIFICANT CHANGE UP (ref 13–44)
MCHC RBC-ENTMCNC: 31.3 PG — SIGNIFICANT CHANGE UP (ref 27–34)
MCHC RBC-ENTMCNC: 33.6 G/DL — SIGNIFICANT CHANGE UP (ref 32–36)
MCV RBC AUTO: 93.2 FL — SIGNIFICANT CHANGE UP (ref 80–100)
MONOCYTES # BLD AUTO: 1.1 K/UL — HIGH (ref 0–0.9)
MONOCYTES NFR BLD AUTO: 15 % — HIGH (ref 2–14)
NEUTROPHILS # BLD AUTO: 3.8 K/UL — SIGNIFICANT CHANGE UP (ref 1.8–7.4)
NEUTROPHILS NFR BLD AUTO: 66 % — SIGNIFICANT CHANGE UP (ref 43–77)
NRBC # BLD: 1 /100 — HIGH (ref 0–0)
PLAT MORPH BLD: NORMAL — SIGNIFICANT CHANGE UP
PLATELET # BLD AUTO: 124 K/UL — LOW (ref 150–400)
RBC # BLD: 3 M/UL — LOW (ref 4.2–5.8)
RBC # FLD: 15.6 % — HIGH (ref 10.3–14.5)
RBC BLD AUTO: SIGNIFICANT CHANGE UP
WBC # BLD: 5.9 K/UL — SIGNIFICANT CHANGE UP (ref 3.8–10.5)
WBC # FLD AUTO: 5.9 K/UL — SIGNIFICANT CHANGE UP (ref 3.8–10.5)

## 2019-03-18 PROCEDURE — 99214 OFFICE O/P EST MOD 30 MIN: CPT

## 2019-03-19 ENCOUNTER — FORM ENCOUNTER (OUTPATIENT)
Age: 69
End: 2019-03-19

## 2019-03-19 ENCOUNTER — OUTPATIENT (OUTPATIENT)
Dept: OUTPATIENT SERVICES | Facility: HOSPITAL | Age: 69
LOS: 1 days | Discharge: HOME | End: 2019-03-19

## 2019-03-19 DIAGNOSIS — Z98.890 OTHER SPECIFIED POSTPROCEDURAL STATES: Chronic | ICD-10-CM

## 2019-03-19 DIAGNOSIS — C83.39 DIFFUSE LARGE B-CELL LYMPHOMA, EXTRANODAL AND SOLID ORGAN SITES: ICD-10-CM

## 2019-03-19 DIAGNOSIS — S52.90XA UNSPECIFIED FRACTURE OF UNSPECIFIED FOREARM, INITIAL ENCOUNTER FOR CLOSED FRACTURE: Chronic | ICD-10-CM

## 2019-03-19 LAB
ALBUMIN SERPL ELPH-MCNC: 4.2 G/DL
ALP BLD-CCNC: 70 U/L
ALT SERPL-CCNC: 6 U/L
ANION GAP SERPL CALC-SCNC: 12 MMOL/L
AST SERPL-CCNC: 12 U/L
BILIRUB SERPL-MCNC: 0.2 MG/DL
BUN SERPL-MCNC: 17 MG/DL
CALCIUM SERPL-MCNC: 9.2 MG/DL
CHLORIDE SERPL-SCNC: 101 MMOL/L
CO2 SERPL-SCNC: 24 MMOL/L
CREAT SERPL-MCNC: 1.37 MG/DL
GLUCOSE SERPL-MCNC: 116 MG/DL
LDH SERPL-CCNC: 221 U/L
POTASSIUM SERPL-SCNC: 4.5 MMOL/L
PROT SERPL-MCNC: 6.9 G/DL
SODIUM SERPL-SCNC: 137 MMOL/L

## 2019-03-19 NOTE — ASSESSMENT
[FreeTextEntry1] : 67yo M w/ newly diagnosed DLBCL. He is s/p trach/PEG when he presented to [facility] with SOB from large oropharyngeal mass.\par He is s/p C1 R-CEOP with Rituxan on 12/20 and CEOP starting 12/21. He received Neupogen afterwards. His mass and cervical adenopathy has improved significantly. \par His trach and PEG has now been removed\par \par -C5 R-CEOP on 3/27. Will have him come in for Neulasta on day 4 since he was unable to keep Onpro on with cycle 2. \par -pt has not scheduled for PET/CT yet -provided him the information to do so\par -RTC 3 weeks\par -all questions answered

## 2019-03-19 NOTE — PHYSICAL EXAM
[Restricted in physically strenuous activity but ambulatory and able to carry out work of a light or sedentary nature] : Status 1- Restricted in physically strenuous activity but ambulatory and able to carry out work of a light or sedentary nature, e.g., light house work, office work [Normal] : affect appropriate [de-identified] : PEG site healed

## 2019-03-19 NOTE — HISTORY OF PRESENT ILLNESS
[de-identified] : 69 y/o male with a h/o weight loss of the past few months. Over the past few weeks he has had a lack of appetite and dysphagia with voice changes. THis was associated with throat pain and difficulty breathing. Pt was treated with Clindamycin for a throat infection but it did not help. Pt saw Dr. Cervantes who ordered a CT neck that showed a tonsil mass. He saw Dr. Billy who did tonsillar bx in the office which was non-diagnostic. He has since had a PET/CT which showed previously noted large left sided neck mass extending from the left hilum of glossopharyngeal sulcus and left tonsil which is intensely hypermetabolic. This mass crosses midline and causes mass effect upon airway causing some deviation. It extends inferiorly to subglottic area . There is b/l hypermetabolic LAD with markedly enlarged level 2 nodes on left. There were also findings in the abdomen concerning for metastatic LN. The patient now has progressively protruberant tongue and prolonged periods of "sleep apnea". He c/o severe throat pain and left otalgia. He denies dysphagia, cough, or any other symptoms. \par \par He presented to the ED 12/4/18 with SOB. At that time flexible laryngoscopy showed patent airway but inability to tolerate secretions with pooling of secretions in the hypopharynx and suspected aspiration. Awake nasal fiberoptic intubation was attempted by the anesthesia team in the ED however without success and the patient was then brought urgently to the OR for fiberoptic intubation, tracheotomy, direct laryngoscopy, biopsy on 12/4.  Pathology showed DLBCL GCB subtype. FISH negative for BCL6, MYC, MYC/IGH and IGH/BLC2 translocation. Pt also had an excisional LN bx on 12/15. CT C/A/P with indeterminate adrenal nodules only, hep panel negative, TTE with EF of 35%\par s/p BM biopsy on 12/19/18 - no involvement by lymphoma\par Started on R-CEOP with Rituxan on 12/20 and CEOP starting 12/21. He received Neupogen afterwards. Tolerated treatment well. \par He had melanotic stools overnight on 12/21 with drop in hemoglobin of about 3 gm. s/p EGD - gastric ulcers x 2 in gastric body. Biopsies showed chronic gastritis with necrotic fragment includes large atypical cells and abundant fungal spores and bacterial forms.  IHC was performed for CD3, CD79a, CD20 and PAX5; however, due to the necrosis, the results are difficult to interpret. Given the history of large cell lymphoma, involvement of this tissue by a lymphoproliferative process cannot be excluded.\par Breathing and swallowing improved significantly since starting chemotherapy. Speech swallow evaluation 12/26 - pureed diet, hold tube feeds on d/c. Per ENT, will wait until after chemotherapy treatment is completed before decannulation. [de-identified] : His trach and PEG has since been removed. \par C2 R-CEOP was given on 1/24/19. Onpro fell off after 2 hours so he did not receive GCSF.\par C3 was on 2/13/19. Neulasta on 2/16. \par C4 on 3/6/19. Neulasta on 3/9/19. \par He has been doing well. Notes having pain with swallowing for a few days last week, got magic mouth wash and liquid oxycodone so now much improved.

## 2019-03-20 ENCOUNTER — OUTPATIENT (OUTPATIENT)
Dept: OUTPATIENT SERVICES | Facility: HOSPITAL | Age: 69
LOS: 1 days | End: 2019-03-20
Payer: MEDICARE

## 2019-03-20 ENCOUNTER — APPOINTMENT (OUTPATIENT)
Dept: NUCLEAR MEDICINE | Facility: IMAGING CENTER | Age: 69
End: 2019-03-20
Payer: MEDICARE

## 2019-03-20 DIAGNOSIS — Z98.890 OTHER SPECIFIED POSTPROCEDURAL STATES: Chronic | ICD-10-CM

## 2019-03-20 DIAGNOSIS — C83.30 DIFFUSE LARGE B-CELL LYMPHOMA, UNSPECIFIED SITE: ICD-10-CM

## 2019-03-20 DIAGNOSIS — S52.90XA UNSPECIFIED FRACTURE OF UNSPECIFIED FOREARM, INITIAL ENCOUNTER FOR CLOSED FRACTURE: Chronic | ICD-10-CM

## 2019-03-20 PROCEDURE — 78815 PET IMAGE W/CT SKULL-THIGH: CPT | Mod: 26,PS

## 2019-03-20 PROCEDURE — A9552: CPT

## 2019-03-20 PROCEDURE — 78815 PET IMAGE W/CT SKULL-THIGH: CPT

## 2019-03-27 ENCOUNTER — RESULT REVIEW (OUTPATIENT)
Age: 69
End: 2019-03-27

## 2019-03-27 ENCOUNTER — LABORATORY RESULT (OUTPATIENT)
Age: 69
End: 2019-03-27

## 2019-03-27 ENCOUNTER — APPOINTMENT (OUTPATIENT)
Dept: INFUSION THERAPY | Facility: HOSPITAL | Age: 69
End: 2019-03-27

## 2019-03-27 LAB
BASOPHILS # BLD AUTO: 0 K/UL — SIGNIFICANT CHANGE UP (ref 0–0.2)
BASOPHILS NFR BLD AUTO: 0.7 % — SIGNIFICANT CHANGE UP (ref 0–2)
EOSINOPHIL # BLD AUTO: 0.1 K/UL — SIGNIFICANT CHANGE UP (ref 0–0.5)
EOSINOPHIL NFR BLD AUTO: 2.9 % — SIGNIFICANT CHANGE UP (ref 0–6)
HCT VFR BLD CALC: 27.7 % — LOW (ref 39–50)
HGB BLD-MCNC: 9.9 G/DL — LOW (ref 13–17)
LYMPHOCYTES # BLD AUTO: 0.8 K/UL — LOW (ref 1–3.3)
LYMPHOCYTES # BLD AUTO: 15.3 % — SIGNIFICANT CHANGE UP (ref 13–44)
MCHC RBC-ENTMCNC: 33.4 PG — SIGNIFICANT CHANGE UP (ref 27–34)
MCHC RBC-ENTMCNC: 35.7 G/DL — SIGNIFICANT CHANGE UP (ref 32–36)
MCV RBC AUTO: 93.6 FL — SIGNIFICANT CHANGE UP (ref 80–100)
MONOCYTES # BLD AUTO: 0.7 K/UL — SIGNIFICANT CHANGE UP (ref 0–0.9)
MONOCYTES NFR BLD AUTO: 14.1 % — HIGH (ref 2–14)
NEUTROPHILS # BLD AUTO: 3.5 K/UL — SIGNIFICANT CHANGE UP (ref 1.8–7.4)
NEUTROPHILS NFR BLD AUTO: 67.1 % — SIGNIFICANT CHANGE UP (ref 43–77)
PLATELET # BLD AUTO: 250 K/UL — SIGNIFICANT CHANGE UP (ref 150–400)
RBC # BLD: 2.96 M/UL — LOW (ref 4.2–5.8)
RBC # FLD: 15.8 % — HIGH (ref 10.3–14.5)
WBC # BLD: 5.2 K/UL — SIGNIFICANT CHANGE UP (ref 3.8–10.5)
WBC # FLD AUTO: 5.2 K/UL — SIGNIFICANT CHANGE UP (ref 3.8–10.5)

## 2019-03-28 ENCOUNTER — APPOINTMENT (OUTPATIENT)
Dept: INFUSION THERAPY | Facility: HOSPITAL | Age: 69
End: 2019-03-28

## 2019-03-28 DIAGNOSIS — R11.2 NAUSEA WITH VOMITING, UNSPECIFIED: ICD-10-CM

## 2019-03-28 DIAGNOSIS — Z51.11 ENCOUNTER FOR ANTINEOPLASTIC CHEMOTHERAPY: ICD-10-CM

## 2019-03-29 ENCOUNTER — APPOINTMENT (OUTPATIENT)
Dept: INFUSION THERAPY | Facility: HOSPITAL | Age: 69
End: 2019-03-29

## 2019-03-30 ENCOUNTER — APPOINTMENT (OUTPATIENT)
Dept: INFUSION THERAPY | Facility: HOSPITAL | Age: 69
End: 2019-03-30

## 2019-04-01 DIAGNOSIS — Z51.89 ENCOUNTER FOR OTHER SPECIFIED AFTERCARE: ICD-10-CM

## 2019-04-17 ENCOUNTER — RESULT REVIEW (OUTPATIENT)
Age: 69
End: 2019-04-17

## 2019-04-17 ENCOUNTER — LABORATORY RESULT (OUTPATIENT)
Age: 69
End: 2019-04-17

## 2019-04-17 ENCOUNTER — APPOINTMENT (OUTPATIENT)
Dept: HEMATOLOGY ONCOLOGY | Facility: CLINIC | Age: 69
End: 2019-04-17
Payer: MEDICARE

## 2019-04-17 ENCOUNTER — APPOINTMENT (OUTPATIENT)
Dept: INFUSION THERAPY | Facility: HOSPITAL | Age: 69
End: 2019-04-17

## 2019-04-17 VITALS
HEART RATE: 87 BPM | SYSTOLIC BLOOD PRESSURE: 133 MMHG | BODY MASS INDEX: 25.42 KG/M2 | RESPIRATION RATE: 16 BRPM | DIASTOLIC BLOOD PRESSURE: 76 MMHG | TEMPERATURE: 98.4 F | WEIGHT: 206.13 LBS | OXYGEN SATURATION: 100 %

## 2019-04-17 LAB
BASOPHILS # BLD AUTO: 0 K/UL — SIGNIFICANT CHANGE UP (ref 0–0.2)
BASOPHILS NFR BLD AUTO: 0.7 % — SIGNIFICANT CHANGE UP (ref 0–2)
EOSINOPHIL # BLD AUTO: 0.1 K/UL — SIGNIFICANT CHANGE UP (ref 0–0.5)
EOSINOPHIL NFR BLD AUTO: 2.9 % — SIGNIFICANT CHANGE UP (ref 0–6)
HCT VFR BLD CALC: 27.7 % — LOW (ref 39–50)
HGB BLD-MCNC: 9.8 G/DL — LOW (ref 13–17)
LYMPHOCYTES # BLD AUTO: 0.6 K/UL — LOW (ref 1–3.3)
LYMPHOCYTES # BLD AUTO: 19.6 % — SIGNIFICANT CHANGE UP (ref 13–44)
MCHC RBC-ENTMCNC: 34.3 PG — HIGH (ref 27–34)
MCHC RBC-ENTMCNC: 35.4 G/DL — SIGNIFICANT CHANGE UP (ref 32–36)
MCV RBC AUTO: 96.8 FL — SIGNIFICANT CHANGE UP (ref 80–100)
MONOCYTES # BLD AUTO: 0.4 K/UL — SIGNIFICANT CHANGE UP (ref 0–0.9)
MONOCYTES NFR BLD AUTO: 14.3 % — HIGH (ref 2–14)
NEUTROPHILS # BLD AUTO: 1.9 K/UL — SIGNIFICANT CHANGE UP (ref 1.8–7.4)
NEUTROPHILS NFR BLD AUTO: 62.5 % — SIGNIFICANT CHANGE UP (ref 43–77)
PLATELET # BLD AUTO: 181 K/UL — SIGNIFICANT CHANGE UP (ref 150–400)
RBC # BLD: 2.86 M/UL — LOW (ref 4.2–5.8)
RBC # FLD: 16.7 % — HIGH (ref 10.3–14.5)
WBC # BLD: 3 K/UL — LOW (ref 3.8–10.5)
WBC # FLD AUTO: 3 K/UL — LOW (ref 3.8–10.5)

## 2019-04-17 PROCEDURE — 99214 OFFICE O/P EST MOD 30 MIN: CPT

## 2019-04-17 RX ORDER — DIPHENHYDRAMINE HYDROCHLORIDE AND LIDOCAINE HYDROCHLORIDE AND ALUMINUM HYDROXIDE AND MAGNESIUM HYDRO
KIT 4 TIMES DAILY
Qty: 1 | Refills: 0 | Status: ACTIVE | COMMUNITY
Start: 2019-02-22 | End: 1900-01-01

## 2019-04-17 NOTE — HISTORY OF PRESENT ILLNESS
[de-identified] : His trach and PEG has since been removed. \par C2 R-CEOP was given on 1/24/19. Onpro fell off after 2 hours so he did not receive GCSF.\par C3 was on 2/13/19. Neulasta on 2/16. \par C4 on 3/6/19. Neulasta on 3/9/19. \par He has been doing well. Notes having pain with swallowing for a few days last week, got magic mouth wash and liquid oxycodone so now much improved.  \par \par PET/CT (3/20/19): 1. Minimally FDG avid small mediastinal and  bilateral hilar lymph nodes with activity similar to blood pool activity (Deauville2), compatible with treated disease.\par 2. Mildly hypermetabolic subcentimeter nodule within the left parotid gland. This may represent a lymph node versus Wharthin's tumor. Suggest sonographic correlation for further evaluation.\par 3. Generalized diffuse bone marrow hypermetabolism without corresponding abnormality on CT, probably related to treatment with G-CSF.   \par 4. Non-FDG avid bilateral adrenal nodules, possibly adenoma.\par \par C5 on 3/27/19. Still having pain in mouth, using mouthwash but ran out 2 days ago.  [de-identified] : 69 y/o male with a h/o weight loss of the past few months. Over the past few weeks he has had a lack of appetite and dysphagia with voice changes. THis was associated with throat pain and difficulty breathing. Pt was treated with Clindamycin for a throat infection but it did not help. Pt saw Dr. Cervantes who ordered a CT neck that showed a tonsil mass. He saw Dr. Billy who did tonsillar bx in the office which was non-diagnostic. He has since had a PET/CT which showed previously noted large left sided neck mass extending from the left hilum of glossopharyngeal sulcus and left tonsil which is intensely hypermetabolic. This mass crosses midline and causes mass effect upon airway causing some deviation. It extends inferiorly to subglottic area . There is b/l hypermetabolic LAD with markedly enlarged level 2 nodes on left. There were also findings in the abdomen concerning for metastatic LN. The patient now has progressively protruberant tongue and prolonged periods of "sleep apnea". He c/o severe throat pain and left otalgia. He denies dysphagia, cough, or any other symptoms. \par \par He presented to the ED 12/4/18 with SOB. At that time flexible laryngoscopy showed patent airway but inability to tolerate secretions with pooling of secretions in the hypopharynx and suspected aspiration. Awake nasal fiberoptic intubation was attempted by the anesthesia team in the ED however without success and the patient was then brought urgently to the OR for fiberoptic intubation, tracheotomy, direct laryngoscopy, biopsy on 12/4.  Pathology showed DLBCL GCB subtype. FISH negative for BCL6, MYC, MYC/IGH and IGH/BLC2 translocation. Pt also had an excisional LN bx on 12/15. CT C/A/P with indeterminate adrenal nodules only, hep panel negative, TTE with EF of 35%\par s/p BM biopsy on 12/19/18 - no involvement by lymphoma\par Started on R-CEOP with Rituxan on 12/20 and CEOP starting 12/21. He received Neupogen afterwards. Tolerated treatment well. \par He had melanotic stools overnight on 12/21 with drop in hemoglobin of about 3 gm. s/p EGD - gastric ulcers x 2 in gastric body. Biopsies showed chronic gastritis with necrotic fragment includes large atypical cells and abundant fungal spores and bacterial forms.  IHC was performed for CD3, CD79a, CD20 and PAX5; however, due to the necrosis, the results are difficult to interpret. Given the history of large cell lymphoma, involvement of this tissue by a lymphoproliferative process cannot be excluded.\par Breathing and swallowing improved significantly since starting chemotherapy. Speech swallow evaluation 12/26 - pureed diet, hold tube feeds on d/c. Per ENT, will wait until after chemotherapy treatment is completed before decannulation.

## 2019-04-17 NOTE — ASSESSMENT
[FreeTextEntry1] : 69yo M w/ newly diagnosed DLBCL. He is s/p trach/PEG when he presented to [facility] with SOB from large oropharyngeal mass.\par He is s/p C1 R-CEOP with Rituxan on 12/20 and CEOP starting 12/21. He received Neupogen afterwards. His mass and cervical adenopathy has improved significantly. \par His trach and PEG has now been removed\par Interim PET/CT showing good response\par \par -today is C6 day 1 of R-CEOP. Neulasta on day 4. \par -refilled mouthwash\par -RTC 3 weeks\par -f/u w/ PMD\par -all questions answered

## 2019-04-18 ENCOUNTER — APPOINTMENT (OUTPATIENT)
Dept: INFUSION THERAPY | Facility: HOSPITAL | Age: 69
End: 2019-04-18

## 2019-04-18 LAB
ALBUMIN SERPL ELPH-MCNC: 4 G/DL
ALP BLD-CCNC: 78 U/L
ALT SERPL-CCNC: 5 U/L
ANION GAP SERPL CALC-SCNC: 11 MMOL/L
AST SERPL-CCNC: 10 U/L
BILIRUB SERPL-MCNC: 0.2 MG/DL
BUN SERPL-MCNC: 8 MG/DL
CALCIUM SERPL-MCNC: 9.2 MG/DL
CHLORIDE SERPL-SCNC: 110 MMOL/L
CO2 SERPL-SCNC: 25 MMOL/L
CREAT SERPL-MCNC: 0.97 MG/DL
GLUCOSE SERPL-MCNC: 89 MG/DL
LDH SERPL-CCNC: 210 U/L
POTASSIUM SERPL-SCNC: 4.2 MMOL/L
PROT SERPL-MCNC: 6.6 G/DL
SODIUM SERPL-SCNC: 146 MMOL/L

## 2019-04-19 ENCOUNTER — APPOINTMENT (OUTPATIENT)
Dept: INFUSION THERAPY | Facility: HOSPITAL | Age: 69
End: 2019-04-19

## 2019-04-19 ENCOUNTER — OUTPATIENT (OUTPATIENT)
Dept: OUTPATIENT SERVICES | Facility: HOSPITAL | Age: 69
LOS: 1 days | Discharge: HOME | End: 2019-04-19

## 2019-04-19 DIAGNOSIS — Z98.890 OTHER SPECIFIED POSTPROCEDURAL STATES: Chronic | ICD-10-CM

## 2019-04-19 DIAGNOSIS — S52.90XA UNSPECIFIED FRACTURE OF UNSPECIFIED FOREARM, INITIAL ENCOUNTER FOR CLOSED FRACTURE: Chronic | ICD-10-CM

## 2019-04-19 DIAGNOSIS — C83.39 DIFFUSE LARGE B-CELL LYMPHOMA, EXTRANODAL AND SOLID ORGAN SITES: ICD-10-CM

## 2019-04-20 ENCOUNTER — APPOINTMENT (OUTPATIENT)
Dept: INFUSION THERAPY | Facility: HOSPITAL | Age: 69
End: 2019-04-20

## 2019-04-22 DIAGNOSIS — Z51.89 ENCOUNTER FOR OTHER SPECIFIED AFTERCARE: ICD-10-CM

## 2019-04-22 DIAGNOSIS — Z51.11 ENCOUNTER FOR ANTINEOPLASTIC CHEMOTHERAPY: ICD-10-CM

## 2019-05-06 ENCOUNTER — RESULT REVIEW (OUTPATIENT)
Age: 69
End: 2019-05-06

## 2019-05-06 ENCOUNTER — APPOINTMENT (OUTPATIENT)
Dept: HEMATOLOGY ONCOLOGY | Facility: CLINIC | Age: 69
End: 2019-05-06
Payer: MEDICARE

## 2019-05-06 VITALS
TEMPERATURE: 98 F | WEIGHT: 213.19 LBS | RESPIRATION RATE: 17 BRPM | DIASTOLIC BLOOD PRESSURE: 79 MMHG | SYSTOLIC BLOOD PRESSURE: 128 MMHG | HEART RATE: 85 BPM | BODY MASS INDEX: 26.29 KG/M2 | OXYGEN SATURATION: 100 %

## 2019-05-06 LAB
BASOPHILS # BLD AUTO: 0 K/UL — SIGNIFICANT CHANGE UP (ref 0–0.2)
BASOPHILS NFR BLD AUTO: 0.6 % — SIGNIFICANT CHANGE UP (ref 0–2)
EOSINOPHIL # BLD AUTO: 0.1 K/UL — SIGNIFICANT CHANGE UP (ref 0–0.5)
EOSINOPHIL NFR BLD AUTO: 1.6 % — SIGNIFICANT CHANGE UP (ref 0–6)
HCT VFR BLD CALC: 27.8 % — LOW (ref 39–50)
HGB BLD-MCNC: 9.3 G/DL — LOW (ref 13–17)
LYMPHOCYTES # BLD AUTO: 0.7 K/UL — LOW (ref 1–3.3)
LYMPHOCYTES # BLD AUTO: 17.7 % — SIGNIFICANT CHANGE UP (ref 13–44)
MCHC RBC-ENTMCNC: 33.1 PG — SIGNIFICANT CHANGE UP (ref 27–34)
MCHC RBC-ENTMCNC: 33.6 G/DL — SIGNIFICANT CHANGE UP (ref 32–36)
MCV RBC AUTO: 98.5 FL — SIGNIFICANT CHANGE UP (ref 80–100)
MONOCYTES # BLD AUTO: 0.4 K/UL — SIGNIFICANT CHANGE UP (ref 0–0.9)
MONOCYTES NFR BLD AUTO: 10.8 % — SIGNIFICANT CHANGE UP (ref 2–14)
NEUTROPHILS # BLD AUTO: 2.8 K/UL — SIGNIFICANT CHANGE UP (ref 1.8–7.4)
NEUTROPHILS NFR BLD AUTO: 69.3 % — SIGNIFICANT CHANGE UP (ref 43–77)
PLATELET # BLD AUTO: 168 K/UL — SIGNIFICANT CHANGE UP (ref 150–400)
RBC # BLD: 2.83 M/UL — LOW (ref 4.2–5.8)
RBC # FLD: 17.4 % — HIGH (ref 10.3–14.5)
WBC # BLD: 4.1 K/UL — SIGNIFICANT CHANGE UP (ref 3.8–10.5)
WBC # FLD AUTO: 4.1 K/UL — SIGNIFICANT CHANGE UP (ref 3.8–10.5)

## 2019-05-06 PROCEDURE — 99214 OFFICE O/P EST MOD 30 MIN: CPT

## 2019-05-06 NOTE — ASSESSMENT
[FreeTextEntry1] : 69yo M w/ newly diagnosed DLBCL. He is s/p trach/PEG when he presented to [facility] with SOB from large oropharyngeal mass.\par He is s/p C1 R-CEOP with Rituxan on 12/20 and CEOP starting 12/21. He received Neupogen afterwards. His mass and cervical adenopathy has improved significantly. \par His trach and PEG has now been removed\par Interim PET/CT showing good response\par C6 on 4/17/19.\par \par -PET/CT in 4 weeks to assess post treatment response\par -RTC 5 weeks\par -f/u w/ PMD\par -all questions answered

## 2019-05-06 NOTE — HISTORY OF PRESENT ILLNESS
[de-identified] : 67 y/o male with a h/o weight loss of the past few months. Over the past few weeks he has had a lack of appetite and dysphagia with voice changes. THis was associated with throat pain and difficulty breathing. Pt was treated with Clindamycin for a throat infection but it did not help. Pt saw Dr. Cervantes who ordered a CT neck that showed a tonsil mass. He saw Dr. Billy who did tonsillar bx in the office which was non-diagnostic. He has since had a PET/CT which showed previously noted large left sided neck mass extending from the left hilum of glossopharyngeal sulcus and left tonsil which is intensely hypermetabolic. This mass crosses midline and causes mass effect upon airway causing some deviation. It extends inferiorly to subglottic area . There is b/l hypermetabolic LAD with markedly enlarged level 2 nodes on left. There were also findings in the abdomen concerning for metastatic LN. The patient now has progressively protruberant tongue and prolonged periods of "sleep apnea". He c/o severe throat pain and left otalgia. He denies dysphagia, cough, or any other symptoms. \par \par He presented to the ED 12/4/18 with SOB. At that time flexible laryngoscopy showed patent airway but inability to tolerate secretions with pooling of secretions in the hypopharynx and suspected aspiration. Awake nasal fiberoptic intubation was attempted by the anesthesia team in the ED however without success and the patient was then brought urgently to the OR for fiberoptic intubation, tracheotomy, direct laryngoscopy, biopsy on 12/4.  Pathology showed DLBCL GCB subtype. FISH negative for BCL6, MYC, MYC/IGH and IGH/BLC2 translocation. Pt also had an excisional LN bx on 12/15. CT C/A/P with indeterminate adrenal nodules only, hep panel negative, TTE with EF of 35%\par s/p BM biopsy on 12/19/18 - no involvement by lymphoma\par Started on R-CEOP with Rituxan on 12/20 and CEOP starting 12/21. He received Neupogen afterwards. Tolerated treatment well. \par He had melanotic stools overnight on 12/21 with drop in hemoglobin of about 3 gm. s/p EGD - gastric ulcers x 2 in gastric body. Biopsies showed chronic gastritis with necrotic fragment includes large atypical cells and abundant fungal spores and bacterial forms.  IHC was performed for CD3, CD79a, CD20 and PAX5; however, due to the necrosis, the results are difficult to interpret. Given the history of large cell lymphoma, involvement of this tissue by a lymphoproliferative process cannot be excluded.\par Breathing and swallowing improved significantly since starting chemotherapy. Speech swallow evaluation 12/26 - pureed diet, hold tube feeds on d/c. Per ENT, will wait until after chemotherapy treatment is completed before decannulation. [de-identified] : His trach and PEG has since been removed. \par C2 R-CEOP was given on 1/24/19. Onpro fell off after 2 hours so he did not receive GCSF.\par C3 was on 2/13/19. Neulasta on 2/16. \par C4 on 3/6/19. Neulasta on 3/9/19. \par He has been doing well. Notes having pain with swallowing for a few days last week, got magic mouth wash and liquid oxycodone so now much improved.  \par \par PET/CT (3/20/19): 1. Minimally FDG avid small mediastinal and  bilateral hilar lymph nodes with activity similar to blood pool activity (Deauville2), compatible with treated disease.\par 2. Mildly hypermetabolic subcentimeter nodule within the left parotid gland. This may represent a lymph node versus Wharthin's tumor. Suggest sonographic correlation for further evaluation.\par 3. Generalized diffuse bone marrow hypermetabolism without corresponding abnormality on CT, probably related to treatment with G-CSF.   \par 4. Non-FDG avid bilateral adrenal nodules, possibly adenoma.\par \par C5 on 3/27/19. Still having pain in mouth, using mouthwash but ran out 2 days ago. \par \par C6 on 4/17/19. Eating well. Notes b/l LE edema for the last 3 days.

## 2019-05-06 NOTE — PHYSICAL EXAM
[Restricted in physically strenuous activity but ambulatory and able to carry out work of a light or sedentary nature] : Status 1- Restricted in physically strenuous activity but ambulatory and able to carry out work of a light or sedentary nature, e.g., light house work, office work [Normal] : pharynx is unremarkable, moist mucus membrane, no oral lesions [de-identified] : 1+ LE edema b/l

## 2019-05-07 LAB
ALBUMIN SERPL ELPH-MCNC: 4.3 G/DL
ALP BLD-CCNC: 81 U/L
ALT SERPL-CCNC: 6 U/L
ANION GAP SERPL CALC-SCNC: 14 MMOL/L
AST SERPL-CCNC: 12 U/L
BILIRUB SERPL-MCNC: 0.2 MG/DL
BUN SERPL-MCNC: 12 MG/DL
CALCIUM SERPL-MCNC: 8.8 MG/DL
CHLORIDE SERPL-SCNC: 106 MMOL/L
CO2 SERPL-SCNC: 24 MMOL/L
CREAT SERPL-MCNC: 1.1 MG/DL
GLUCOSE SERPL-MCNC: 103 MG/DL
LDH SERPL-CCNC: 212 U/L
POTASSIUM SERPL-SCNC: 3.8 MMOL/L
PROT SERPL-MCNC: 6.7 G/DL
SODIUM SERPL-SCNC: 144 MMOL/L

## 2019-05-14 ENCOUNTER — FORM ENCOUNTER (OUTPATIENT)
Age: 69
End: 2019-05-14

## 2019-05-15 ENCOUNTER — APPOINTMENT (OUTPATIENT)
Dept: NUCLEAR MEDICINE | Facility: IMAGING CENTER | Age: 69
End: 2019-05-15
Payer: MEDICARE

## 2019-05-15 ENCOUNTER — OUTPATIENT (OUTPATIENT)
Dept: OUTPATIENT SERVICES | Facility: HOSPITAL | Age: 69
LOS: 1 days | End: 2019-05-15
Payer: MEDICARE

## 2019-05-15 DIAGNOSIS — S52.90XA UNSPECIFIED FRACTURE OF UNSPECIFIED FOREARM, INITIAL ENCOUNTER FOR CLOSED FRACTURE: Chronic | ICD-10-CM

## 2019-05-15 DIAGNOSIS — C83.30 DIFFUSE LARGE B-CELL LYMPHOMA, UNSPECIFIED SITE: ICD-10-CM

## 2019-05-15 DIAGNOSIS — Z98.890 OTHER SPECIFIED POSTPROCEDURAL STATES: Chronic | ICD-10-CM

## 2019-05-15 PROCEDURE — A9552: CPT

## 2019-05-15 PROCEDURE — 78815 PET IMAGE W/CT SKULL-THIGH: CPT | Mod: 26,PS

## 2019-05-15 PROCEDURE — 78815 PET IMAGE W/CT SKULL-THIGH: CPT

## 2019-05-30 ENCOUNTER — APPOINTMENT (OUTPATIENT)
Dept: GASTROENTEROLOGY | Facility: CLINIC | Age: 69
End: 2019-05-30
Payer: MEDICARE

## 2019-05-30 VITALS
TEMPERATURE: 98.2 F | HEART RATE: 90 BPM | HEIGHT: 75.5 IN | DIASTOLIC BLOOD PRESSURE: 80 MMHG | OXYGEN SATURATION: 98 % | SYSTOLIC BLOOD PRESSURE: 120 MMHG | WEIGHT: 210 LBS | BODY MASS INDEX: 25.84 KG/M2

## 2019-05-30 DIAGNOSIS — R94.8 ABNORMAL RESULTS OF FUNCTION STUDIES OF OTHER ORGANS AND SYSTEMS: ICD-10-CM

## 2019-05-30 DIAGNOSIS — I49.1 ATRIAL PREMATURE DEPOLARIZATION: ICD-10-CM

## 2019-05-30 DIAGNOSIS — Z12.12 ENCOUNTER FOR SCREENING FOR MALIGNANT NEOPLASM OF COLON: ICD-10-CM

## 2019-05-30 DIAGNOSIS — Z12.11 ENCOUNTER FOR SCREENING FOR MALIGNANT NEOPLASM OF COLON: ICD-10-CM

## 2019-05-30 DIAGNOSIS — E11.9 TYPE 2 DIABETES MELLITUS W/OUT COMPLICATIONS: ICD-10-CM

## 2019-05-30 PROCEDURE — 99214 OFFICE O/P EST MOD 30 MIN: CPT

## 2019-05-30 PROCEDURE — 99204 OFFICE O/P NEW MOD 45 MIN: CPT

## 2019-05-30 RX ORDER — POLYETHYLENE GLYCOL 3350, SODIUM CHLORIDE, SODIUM BICARBONATE AND POTASSIUM CHLORIDE WITH LEMON FLAVOR 420; 11.2; 5.72; 1.48 G/4L; G/4L; G/4L; G/4L
420 POWDER, FOR SOLUTION ORAL
Qty: 1 | Refills: 0 | Status: ACTIVE | COMMUNITY
Start: 2019-05-30 | End: 1900-01-01

## 2019-05-30 RX ORDER — ALBUTEROL SULFATE 90 UG/1
108 (90 BASE) AEROSOL, METERED RESPIRATORY (INHALATION)
Refills: 0 | Status: ACTIVE | COMMUNITY

## 2019-05-30 RX ORDER — KETOCONAZOLE 20 MG/G
2 CREAM TOPICAL
Refills: 0 | Status: ACTIVE | COMMUNITY

## 2019-05-30 RX ORDER — ASPIRIN 325 MG/1
TABLET, FILM COATED ORAL
Refills: 0 | Status: DISCONTINUED | COMMUNITY
End: 2019-05-30

## 2019-05-30 RX ORDER — ERGOCALCIFEROL 1.25 MG/1
1.25 MG CAPSULE, LIQUID FILLED ORAL
Refills: 0 | Status: ACTIVE | COMMUNITY

## 2019-05-30 RX ORDER — PREDNISONE 50 MG/1
50 TABLET ORAL
Qty: 4 | Refills: 2 | Status: DISCONTINUED | COMMUNITY
Start: 2019-01-23 | End: 2019-05-30

## 2019-05-30 RX ORDER — CLINDAMYCIN HYDROCHLORIDE 300 MG/1
300 CAPSULE ORAL
Refills: 0 | Status: DISCONTINUED | COMMUNITY
End: 2019-05-30

## 2019-05-30 RX ORDER — METOCLOPRAMIDE 10 MG/1
10 TABLET ORAL
Qty: 30 | Refills: 2 | Status: DISCONTINUED | COMMUNITY
Start: 2019-01-23 | End: 2019-05-30

## 2019-05-30 NOTE — PHYSICAL EXAM
[General Appearance - Alert] : alert [General Appearance - In No Acute Distress] : in no acute distress [Sclera] : the sclera and conjunctiva were normal [PERRL With Normal Accommodation] : pupils were equal in size, round, and reactive to light [Extraocular Movements] : extraocular movements were intact [Outer Ear] : the ears and nose were normal in appearance [Oropharynx] : the oropharynx was normal [Neck Appearance] : the appearance of the neck was normal [Neck Cervical Mass (___cm)] : no neck mass was observed [Jugular Venous Distention Increased] : there was no jugular-venous distention [Thyroid Diffuse Enlargement] : the thyroid was not enlarged [Thyroid Nodule] : there were no palpable thyroid nodules [Auscultation Breath Sounds / Voice Sounds] : lungs were clear to auscultation bilaterally [Heart Sounds] : normal S1 and S2 [Heart Sounds Gallop] : no gallops [Murmurs] : no murmurs [Heart Sounds Pericardial Friction Rub] : no pericardial rub [Regular-Premature Beats] : the rhythm was regular with premature beats [Bowel Sounds] : normal bowel sounds [Abdomen Soft] : soft [Abdomen Tenderness] : non-tender [Abdomen Mass (___ Cm)] : no abdominal mass palpated [Cervical Lymph Nodes Enlarged Posterior Bilaterally] : posterior cervical [Cervical Lymph Nodes Enlarged Anterior Bilaterally] : anterior cervical [Supraclavicular Lymph Nodes Enlarged Bilaterally] : supraclavicular [Abnormal Walk] : normal gait [Nail Clubbing] : no clubbing  or cyanosis of the fingernails [Musculoskeletal - Swelling] : no joint swelling seen [Motor Tone] : muscle strength and tone were normal [Skin Color & Pigmentation] : normal skin color and pigmentation [Skin Turgor] : normal skin turgor [] : no rash [Deep Tendon Reflexes (DTR)] : deep tendon reflexes were 2+ and symmetric [Sensation] : the sensory exam was normal to light touch and pinprick [No Focal Deficits] : no focal deficits [Oriented To Time, Place, And Person] : oriented to person, place, and time [Impaired Insight] : insight and judgment were intact [Affect] : the affect was normal

## 2019-05-30 NOTE — HISTORY OF PRESENT ILLNESS
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 He ad a PEG that was removed. He underwent a PET scan on 5/15/19 that showed uptake in the midsigmoid region r/o polyp. He is referred for a colonoscopy. His most recent colonoscopy oy according to the patient 3 years ago was negative. He denies abdominal pain, rectal bleeding, melena or hematemesis.

## 2019-06-13 ENCOUNTER — FORM ENCOUNTER (OUTPATIENT)
Age: 69
End: 2019-06-13

## 2019-06-14 ENCOUNTER — APPOINTMENT (OUTPATIENT)
Dept: MRI IMAGING | Facility: IMAGING CENTER | Age: 69
End: 2019-06-14
Payer: MEDICARE

## 2019-06-14 ENCOUNTER — OUTPATIENT (OUTPATIENT)
Dept: OUTPATIENT SERVICES | Facility: HOSPITAL | Age: 69
LOS: 1 days | End: 2019-06-14
Payer: MEDICARE

## 2019-06-14 DIAGNOSIS — Z98.890 OTHER SPECIFIED POSTPROCEDURAL STATES: Chronic | ICD-10-CM

## 2019-06-14 DIAGNOSIS — C83.30 DIFFUSE LARGE B-CELL LYMPHOMA, UNSPECIFIED SITE: ICD-10-CM

## 2019-06-14 DIAGNOSIS — S52.90XA UNSPECIFIED FRACTURE OF UNSPECIFIED FOREARM, INITIAL ENCOUNTER FOR CLOSED FRACTURE: Chronic | ICD-10-CM

## 2019-06-14 PROCEDURE — 74183 MRI ABD W/O CNTR FLWD CNTR: CPT | Mod: 26

## 2019-06-14 PROCEDURE — 74183 MRI ABD W/O CNTR FLWD CNTR: CPT

## 2019-06-14 PROCEDURE — A9585: CPT

## 2019-07-10 ENCOUNTER — OUTPATIENT (OUTPATIENT)
Dept: OUTPATIENT SERVICES | Facility: HOSPITAL | Age: 69
LOS: 1 days | End: 2019-07-10

## 2019-07-10 VITALS
SYSTOLIC BLOOD PRESSURE: 110 MMHG | HEIGHT: 72 IN | DIASTOLIC BLOOD PRESSURE: 70 MMHG | WEIGHT: 199.08 LBS | TEMPERATURE: 98 F | RESPIRATION RATE: 16 BRPM | HEART RATE: 68 BPM

## 2019-07-10 DIAGNOSIS — K63.9 DISEASE OF INTESTINE, UNSPECIFIED: ICD-10-CM

## 2019-07-10 DIAGNOSIS — I48.91 UNSPECIFIED ATRIAL FIBRILLATION: ICD-10-CM

## 2019-07-10 DIAGNOSIS — Z90.49 ACQUIRED ABSENCE OF OTHER SPECIFIED PARTS OF DIGESTIVE TRACT: Chronic | ICD-10-CM

## 2019-07-10 DIAGNOSIS — S52.90XA UNSPECIFIED FRACTURE OF UNSPECIFIED FOREARM, INITIAL ENCOUNTER FOR CLOSED FRACTURE: Chronic | ICD-10-CM

## 2019-07-10 DIAGNOSIS — Z98.890 OTHER SPECIFIED POSTPROCEDURAL STATES: Chronic | ICD-10-CM

## 2019-07-10 DIAGNOSIS — Z12.12 ENCOUNTER FOR SCREENING FOR MALIGNANT NEOPLASM OF RECTUM: ICD-10-CM

## 2019-07-10 DIAGNOSIS — G47.30 SLEEP APNEA, UNSPECIFIED: ICD-10-CM

## 2019-07-10 DIAGNOSIS — Z12.11 ENCOUNTER FOR SCREENING FOR MALIGNANT NEOPLASM OF COLON: ICD-10-CM

## 2019-07-10 DIAGNOSIS — Z01.818 ENCOUNTER FOR OTHER PREPROCEDURAL EXAMINATION: ICD-10-CM

## 2019-07-10 LAB
ALBUMIN SERPL ELPH-MCNC: 4.5 G/DL — SIGNIFICANT CHANGE UP (ref 3.3–5)
ALP SERPL-CCNC: 107 U/L — SIGNIFICANT CHANGE UP (ref 40–120)
ALT FLD-CCNC: 7 U/L — SIGNIFICANT CHANGE UP (ref 4–41)
ANION GAP SERPL CALC-SCNC: 11 MMO/L — SIGNIFICANT CHANGE UP (ref 7–14)
AST SERPL-CCNC: 11 U/L — SIGNIFICANT CHANGE UP (ref 4–40)
BILIRUB SERPL-MCNC: 0.4 MG/DL — SIGNIFICANT CHANGE UP (ref 0.2–1.2)
BUN SERPL-MCNC: 12 MG/DL — SIGNIFICANT CHANGE UP (ref 7–23)
CALCIUM SERPL-MCNC: 9.7 MG/DL — SIGNIFICANT CHANGE UP (ref 8.4–10.5)
CHLORIDE SERPL-SCNC: 104 MMOL/L — SIGNIFICANT CHANGE UP (ref 98–107)
CO2 SERPL-SCNC: 24 MMOL/L — SIGNIFICANT CHANGE UP (ref 22–31)
CREAT SERPL-MCNC: 1.14 MG/DL — SIGNIFICANT CHANGE UP (ref 0.5–1.3)
GLUCOSE SERPL-MCNC: 91 MG/DL — SIGNIFICANT CHANGE UP (ref 70–99)
HBA1C BLD-MCNC: 5.5 % — SIGNIFICANT CHANGE UP (ref 4–5.6)
HCT VFR BLD CALC: 39.4 % — SIGNIFICANT CHANGE UP (ref 39–50)
HGB BLD-MCNC: 13.2 G/DL — SIGNIFICANT CHANGE UP (ref 13–17)
MCHC RBC-ENTMCNC: 31.2 PG — SIGNIFICANT CHANGE UP (ref 27–34)
MCHC RBC-ENTMCNC: 33.5 % — SIGNIFICANT CHANGE UP (ref 32–36)
MCV RBC AUTO: 93.1 FL — SIGNIFICANT CHANGE UP (ref 80–100)
NRBC # FLD: 0 K/UL — SIGNIFICANT CHANGE UP (ref 0–0)
PLATELET # BLD AUTO: 170 K/UL — SIGNIFICANT CHANGE UP (ref 150–400)
PMV BLD: 11.2 FL — SIGNIFICANT CHANGE UP (ref 7–13)
POTASSIUM SERPL-MCNC: 3.6 MMOL/L — SIGNIFICANT CHANGE UP (ref 3.5–5.3)
POTASSIUM SERPL-SCNC: 3.6 MMOL/L — SIGNIFICANT CHANGE UP (ref 3.5–5.3)
PROT SERPL-MCNC: 7.8 G/DL — SIGNIFICANT CHANGE UP (ref 6–8.3)
RBC # BLD: 4.23 M/UL — SIGNIFICANT CHANGE UP (ref 4.2–5.8)
RBC # FLD: 13.2 % — SIGNIFICANT CHANGE UP (ref 10.3–14.5)
SODIUM SERPL-SCNC: 139 MMOL/L — SIGNIFICANT CHANGE UP (ref 135–145)
WBC # BLD: 4.59 K/UL — SIGNIFICANT CHANGE UP (ref 3.8–10.5)
WBC # FLD AUTO: 4.59 K/UL — SIGNIFICANT CHANGE UP (ref 3.8–10.5)

## 2019-07-10 RX ORDER — BACLOFEN 100 %
1 POWDER (GRAM) MISCELLANEOUS
Qty: 0 | Refills: 0 | DISCHARGE

## 2019-07-10 RX ORDER — LOSARTAN/HYDROCHLOROTHIAZIDE 100MG-25MG
1 TABLET ORAL
Qty: 0 | Refills: 0 | DISCHARGE

## 2019-07-10 RX ORDER — IBUPROFEN 200 MG
1 TABLET ORAL
Qty: 0 | Refills: 0 | DISCHARGE

## 2019-07-10 RX ORDER — CHOLECALCIFEROL (VITAMIN D3) 125 MCG
1 CAPSULE ORAL
Qty: 0 | Refills: 0 | DISCHARGE

## 2019-07-10 RX ORDER — ALBUTEROL 90 UG/1
2 AEROSOL, METERED ORAL
Qty: 0 | Refills: 0 | DISCHARGE

## 2019-07-10 RX ORDER — NABUMETONE 750 MG
1 TABLET ORAL
Qty: 0 | Refills: 0 | DISCHARGE

## 2019-07-10 RX ORDER — AMLODIPINE BESYLATE 2.5 MG/1
1 TABLET ORAL
Qty: 0 | Refills: 0 | DISCHARGE

## 2019-07-10 NOTE — H&P PST ADULT - NSICDXPROBLEM_GEN_ALL_CORE_FT
PROBLEM DIAGNOSES  Problem: Mass of colon  Assessment and Plan:     Problem: Atrial fibrillation  Assessment and Plan: PROBLEM DIAGNOSES  Problem: Mass of colon  Assessment and Plan: PROBLEM DIAGNOSES  Problem: Mass of colon  Assessment and Plan: This is a 69 y/o male who is scheduled for colonoscopy anesthesia on 7-17-19  * Given pre op instructions with good verbalization of understanding by the patient  * Await medical evaluation with pcp since patient poor historian and require accurate information  * Instructed to take normal am dose of amlodipine, losartan-HCTZ, ventolin (if needed) the am of surgery    Problem: Sleep apnea  Assessment and Plan: Notified OR booking via fax of sleep apnea, right knee replacement, and DM type II (patient doesn't remember name or number of MD who did study)

## 2019-07-10 NOTE — H&P PST ADULT - NSICDXPASTMEDICALHX_GEN_ALL_CORE_FT
PAST MEDICAL HISTORY:  Aching pain     Asthma Denies recent hospitalizations for asthma    Atrial fibrillation noted on ekg done at [facility] PAST appointment on 7-10-19    DM (diabetes mellitus) Not on any medications    HTN (hypertension)     Localized swelling, mass and lump, head     Sleep apnea     Tonsil cancer PAST MEDICAL HISTORY:  Aching pain colon mass in 2019    Asthma Denies recent hospitalizations for asthma    Atrial fibrillation noted on ekg done at [facility] PAST appointment on 7-10-19    DM (diabetes mellitus) Not on any medications    H/O vitamin D deficiency     HTN (hypertension)     Localized swelling, mass and lump, head     Sleep apnea     Tonsil cancer PAST MEDICAL HISTORY:  Aching pain colon mass in 2019    Asthma Denies recent hospitalizations for asthma    Atrial fibrillation has prior h/o atrial fibrillation as per pcp, Dr. Pittman    DM (diabetes mellitus) Not on any medications    H/O vitamin D deficiency     HTN (hypertension)     Localized swelling, mass and lump, head     Sleep apnea     Tonsil cancer tonillar lymphoma

## 2019-07-10 NOTE — H&P PST ADULT - CONTACT INFO FOR SLEEP STUDY
Dr. Katya Levy - " She told me I have sleep Apnea" doesn't know name of MD or phone number - " She told me I have sleep Apnea a long time ago"

## 2019-07-10 NOTE — H&P PST ADULT - NSICDXPASTSURGICALHX_GEN_ALL_CORE_FT
PAST SURGICAL HISTORY:  Forearm fracture     History of surgery Right knee replacement - surgery PAST SURGICAL HISTORY:  Forearm fracture     History of surgery Right knee replacement - surgery    S/P laparoscopic cholecystectomy

## 2019-07-10 NOTE — H&P PST ADULT - HISTORY OF PRESENT ILLNESS
This is a 69 y/o male who is a very poor historian. He presents with incidental finding of "something down there" when he was having a CT scan for prior h/o tonsil cancer. Scheduled for colonoscopy  anesthesia This is a 67 y/o male who is a very poor historian. He presents with incidental finding of "something down there" when he was having a PET scan for prior h/o tonsil cancer. Scheduled for colonoscopy  anesthesia on 7-17-19 This is a 67 y/o male who is a very poor historian. He presents with incidental finding of "something down there" when he was having a MRI  for prior h/o tonsil lymphoma. Scheduled for colonoscopy  anesthesia on 7-17-19 This is a 67 y/o male who is a very poor historian. He presents with incidental finding of "something down there" when he was having a MRI  for prior h/o tonsil lymphoma. Scheduled for colonoscopy  anesthesia on 7-17-19.

## 2019-07-17 ENCOUNTER — APPOINTMENT (OUTPATIENT)
Dept: GASTROENTEROLOGY | Facility: HOSPITAL | Age: 69
End: 2019-07-17

## 2019-08-01 PROBLEM — I48.91 UNSPECIFIED ATRIAL FIBRILLATION: Chronic | Status: ACTIVE | Noted: 2019-07-10

## 2019-08-01 PROBLEM — R52 PAIN, UNSPECIFIED: Chronic | Status: ACTIVE | Noted: 2019-07-10

## 2019-08-01 PROBLEM — C09.9 MALIGNANT NEOPLASM OF TONSIL, UNSPECIFIED: Chronic | Status: ACTIVE | Noted: 2018-11-21

## 2019-08-01 PROBLEM — Z86.39 PERSONAL HISTORY OF OTHER ENDOCRINE, NUTRITIONAL AND METABOLIC DISEASE: Chronic | Status: ACTIVE | Noted: 2019-07-10

## 2019-09-15 ENCOUNTER — TRANSCRIPTION ENCOUNTER (OUTPATIENT)
Age: 69
End: 2019-09-15

## 2019-11-06 ENCOUNTER — APPOINTMENT (OUTPATIENT)
Dept: GASTROENTEROLOGY | Facility: HOSPITAL | Age: 69
End: 2019-11-06

## 2020-02-23 NOTE — H&P PST ADULT - VENOUS THROMBOEMBOLISM BMI
Subjective   Patient ID: Christopher is a 20 year old male.    Chief Complaint   Patient presents with   • Sore Throat     Sore Throat   This is a new problem. Episode onset: 2 weeks ago. The problem occurs constantly (got worse 2 days ago). The problem has been gradually worsening. Associated symptoms include congestion, coughing and a sore throat. Pertinent negatives include no chills or fever. Associated symptoms comments: Slight cough and slight nasal congestion.. The symptoms are aggravated by swallowing (grill smoke from work). Treatments tried: mucinex. The treatment provided no relief.         Past Medical History:   Diagnosis Date   • Pneumonia 06/2019       MEDICATIONS:  No current outpatient medications on file.     No current facility-administered medications for this visit.        ALLERGIES:  ALLERGIES:  No Known Allergies    PAST SURGICAL HISTORY:  Past Surgical History:   Procedure Laterality Date   • Tonsillectomy and adenoidectomy         FAMILY HISTORY:  No family history on file.    SOCIAL HISTORY:  Social History     Tobacco Use   • Smoking status: Never Smoker   • Smokeless tobacco: Never Used   Substance Use Topics   • Alcohol use: Yes     Comment: social use   • Drug use: Not on file         Christopher has a past medical history of Pneumonia (06/2019).  Christopher has a past surgical history that includes Tonsillectomy and adenoidectomy.  Christopher reports that he has never smoked. He has never used smokeless tobacco. He reports current alcohol use.  Christopher currently has no medications in their medication list.  Christopher has No Known Allergies.  Patient's medications, allergies, past medical, surgical, and social history  were reviewed and updated as appropriate.    Review of Systems   Constitutional: Negative for chills and fever.   HENT: Positive for congestion and sore throat. Negative for ear pain, postnasal drip, rhinorrhea and trouble swallowing.    Respiratory: Positive for cough. Negative for chest tightness,  shortness of breath and wheezing.    Cardiovascular: Negative.        Objective   Physical Exam  Vitals signs reviewed.   Constitutional:       General: He is not in acute distress.     Appearance: He is not ill-appearing.   HENT:      Head: Normocephalic.      Right Ear: Hearing, tympanic membrane and ear canal normal.      Left Ear: Hearing, tympanic membrane and ear canal normal.      Nose: Nose normal.      Right Sinus: No maxillary sinus tenderness or frontal sinus tenderness.      Left Sinus: No maxillary sinus tenderness or frontal sinus tenderness.      Mouth/Throat:      Mouth: Mucous membranes are moist.      Pharynx: Posterior oropharyngeal erythema present.      Comments: Tonsils absent.  Neck:      Musculoskeletal: Neck supple.   Cardiovascular:      Rate and Rhythm: Normal rate and regular rhythm.      Heart sounds: Normal heart sounds.   Pulmonary:      Effort: Pulmonary effort is normal.      Breath sounds: Normal breath sounds.   Lymphadenopathy:      Cervical: Cervical adenopathy present.      Right cervical: Superficial cervical adenopathy present.      Left cervical: Superficial cervical adenopathy present.      Comments: < 1 cm non tender   Neurological:      Mental Status: He is alert.       Visit Vitals  /84   Pulse (!) 108   Temp 99.2 °F (37.3 °C) (Oral)   Resp 18   SpO2 98%       Assessment   Problem List Items Addressed This Visit     None          This is a 20 year old year-old male who presents with sore throat.    Instructions provided as documented in the AVS.    Thank you for visiting [facility].  Please follow up with your PCP as needed if symptoms worsen.   (1) obesity (BMI greater than 25)

## 2020-03-14 NOTE — DISCHARGE NOTE ADULT - PATIENT PORTAL LINK FT
You can access the [email or web address] Patient Portal, offered by [facility], by registering with the following website: [email or web address]

## 2020-09-23 NOTE — H&P PST ADULT - NEUROLOGICAL
[FreeTextEntry1] : This is a 66 year old man who is here to see me for thrombocytopenia in the setting of liver disease. Low platelets date back to 2003, when he had ast/alt abnormalities but no diagnosis of cirrhosis.  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 This is reported in patients who recently had SIRT have worsened thrombocytopenia, platelets reported to drop approx 20%. Patient dropped more than this, requiring transfusion likely given the multifactorial nature of his thrombocytopenia.\par \par Given his intolerance to steroids and his increase in platelet count with tpo agonist given in the hospital, will taper steroids to off over the the next 2 weeks. Will also use TPO agonist to try to keep his platelet count approx >50K if possible for now while awaiting next set of scans to assess presence/absense of HCC.  \par \par Discussed in detail with patient and his wife. Will see patient in 4 weeks. Schedule weekly Nplate.\par \par \par  details… detailed exam

## 2020-12-23 PROBLEM — Z12.11 ENCOUNTER FOR COLORECTAL CANCER SCREENING: Status: RESOLVED | Noted: 2019-05-30 | Resolved: 2020-12-23

## 2021-01-21 DIAGNOSIS — Z01.818 ENCOUNTER FOR OTHER PREPROCEDURAL EXAMINATION: ICD-10-CM

## 2021-01-22 ENCOUNTER — APPOINTMENT (OUTPATIENT)
Dept: DISASTER EMERGENCY | Facility: CLINIC | Age: 71
End: 2021-01-22

## 2021-01-23 LAB — SARS-COV-2 N GENE NPH QL NAA+PROBE: NOT DETECTED

## 2021-04-20 NOTE — ED PROVIDER NOTE - NS ED ATTENDING STATEMENT MOD
Video Esophagram Review Rounds  Imaging Review of MBSS conducted with attending physician Wen Salazar and reviewed/discussed with SLP Sarah Cesar, Madiha Green, Rosana Sampson and/or Barbara Kunz    Relevant Background:    Esophagram: 4/15/21 - reflux    MBSS Date: 4/15/21    Findings:  Pharyngeal Weakness:No  Epiglottic dysfunction: No  Penetration: No  Aspiration: No  UES dysfunction: No  Details: safe swallow      Recommendations:  Diet:current  Observation      
I have personally seen and examined this patient.  I have fully participated in the care of this patient. I have reviewed all pertinent clinical information, including history, physical exam, plan and the Resident’s note and agree except as noted.

## 2021-05-12 NOTE — PROVIDER CONTACT NOTE (OTHER) - ASSESSMENT
Quick Note      Patient Name: Ritchie Hadley   Patient ID: [id number]   Sex: Male   YOB: 1969    Primary Care Provider: Alejandro ARREDONDO   Referring Provider: Alejandro ARREDONDO    Visit Date: April 17, 2020    Provider: MARIA ELENA Lowry   Location: [facility]   Location Address: [address] [place] [address], [address]  [id number]   Location Phone: [phone]          History Of Present Illness  TELEHEALTH TELEPHONE VISIT  Chief Complaint: f/u dm, htn, hyperlipidemia   Ritchie Hadley is a 50 year old /White male who is presenting for evaluation via telehealth telephone visit. Verbal consent obtained before beginning visit.   Provider spent 15 min minutes with the patient during telehealth visit.   The following staff were present during this visit: Haider COHN, MARIA ELENA Lowry with Ritchie Hadley by phone conversation   Past Medical History/Overview of Patient Symptoms     patient doing well.  Diabetes sugars are in 120'-130's throughout the day.  Meds refilled on 4/1/2020.  Has been outside working in yard and has lost weight.  currently he states he is 228 pounds    Needs labs.    Allergies:  doing well gets meds otc.                 Assessment  · Allergic rhinitis due to allergen     477.9/J30.9  · Diabetes mellitus, type 2     250.00/E11.9      Plan  · Orders  o Diabetes 2 Panel (Urine Microalbumin, CMP, Lipid, A1c, ) [facility] ([id number], [id number], [id number], 86918) - 250.00/E11.9 - 04/17/2020  o Urinalysis with Reflex Microscopy if abnormal ([facility]) (57189) - 250.00/E11.9 - 04/17/2020  · Medications  o Medications have been Reconciled  o Transition of Care or Provider Policy  · Instructions  o Plan Of Care:   · Disposition  o Return in 6 months            Electronically Signed by: MARIA ELENA Lowry -Author on April 17, 2020 08:23:48 AM  
Patient agiataed and confused refusing protonix drip
Increasing food noted coming from trach with increase coughing.
PEG site bleeding, pt. reports pain at site. VS: 98.7, HR 98, /80, RR 18 98%O2 supplemental O2
Patient A&Ox4. Awaiting next dose of Vancomycin
Pt agitated and angry refusing
Pt appears restless and refusing above mentioned
Pt voided 300 at 1900. No voids since. Pt receiving IV fluids at 125/hr and 200 ml free water flush with no voids since 1900. Pt denies pain or discomfort and urge to void.
Pt. agitated, some confusion, VS stable, no complaints of pain
patient reoriented easily, but multiple times RN entered room to find patient tangled in IV tubing, trying to get out of bed- bed alarm on since start of shift.
pt asymptomatic, all other vitals stable
pt asymptomatic, asking for suctioning

## 2021-12-01 PROCEDURE — G9005: CPT

## 2021-12-01 PROCEDURE — G9001: CPT

## 2021-12-07 NOTE — H&P PST ADULT - LAST STRESS TEST
LTM/EMU   Video: Yes  Photic: No  HV: No  Impedances: Under 5  Leads Fixed: Yes  Events seen: No  Patient ratio: 1/7   " A long time ago, maybe 10 years ago"

## 2022-03-31 NOTE — PROVIDER CONTACT NOTE (OTHER) - REASON
Chief Complaint   Patient presents with    Annual Exam     Visit Vitals  /78 (BP 1 Location: Left upper arm, BP Patient Position: Sitting, BP Cuff Size: Large adult)   Ht 5' 4.5\" (1.638 m)   Wt 200 lb 4 oz (90.8 kg)   LMP 01/01/2017 (Approximate)   BMI 33.84 kg/m² patient confused

## 2022-03-31 NOTE — PHYSICAL THERAPY INITIAL EVALUATION ADULT - DIAGNOSIS, PT EVAL
[address] [facility]  [address] [facility], [id number]  [phone]      HPI:     HPI  Pt presents to the clinic for a medication check. He would like to decrease his adderall to 5mg daily. He states that he is able to concentrate with the 10mg of adderall but he doesn't \"feel like himself when he is in class. \" He would like to decrease the dose to see if that helps that at all. He is sleeping well. He has lost about 6 pounds since his last visit. He has not been taking the medication on a routine basis. He has no further concerns. Current Outpatient Medications   Medication Sig Dispense Refill    amphetamine-dextroamphetamine (ADDERALL, 5MG,) 5 MG tablet Take 1 tablet by mouth daily for 30 days. 30 tablet 0    amphetamine-dextroamphetamine (ADDERALL XR) 10 MG extended release capsule Take 1 capsule by mouth daily for 30 days. 30 capsule 0    Benzoyl Peroxide 2.5 % LIQD Apply topically  g 5     No current facility-administered medications for this visit. No Known Allergies    All patients pastmedical, surgical, social and family history has been reviewed. Subjective:      Review of Systems   Constitutional: Negative for activity change, appetite change, fatigue and fever. Respiratory: Negative for cough, shortness of breath and wheezing. Cardiovascular: Negative for chest pain and palpitations. Psychiatric/Behavioral: Positive for decreased concentration. Objective:      Physical Exam  Vitals and nursing note reviewed. Constitutional:       Appearance: Normal appearance. HENT:      Head: Normocephalic and atraumatic. Cardiovascular:      Rate and Rhythm: Normal rate and regular rhythm. Heart sounds: Normal heart sounds. Pulmonary:      Effort: Pulmonary effort is normal.      Breath sounds: Normal breath sounds. Skin:     General: Skin is warm. Capillary Refill: Capillary refill takes less than 2 seconds.    Neurological:      General: s/p above named procedure

## 2022-05-23 NOTE — ED PROVIDER NOTE - DISPOSITION TYPE
Chief Complaint   Patient presents with     Recheck Medication         Medication Reconciliation: bandar Keys     DISCHARGE

## 2022-07-19 ENCOUNTER — APPOINTMENT (OUTPATIENT)
Dept: OTOLARYNGOLOGY | Facility: CLINIC | Age: 72
End: 2022-07-19

## 2022-07-19 VITALS
DIASTOLIC BLOOD PRESSURE: 92 MMHG | TEMPERATURE: 98.2 F | HEIGHT: 75.5 IN | BODY MASS INDEX: 29.53 KG/M2 | HEART RATE: 75 BPM | WEIGHT: 240 LBS | SYSTOLIC BLOOD PRESSURE: 142 MMHG

## 2022-07-19 DIAGNOSIS — G47.30 SLEEP APNEA, UNSPECIFIED: ICD-10-CM

## 2022-07-19 DIAGNOSIS — Z87.09 PERSONAL HISTORY OF OTHER DISEASES OF THE RESPIRATORY SYSTEM: ICD-10-CM

## 2022-07-19 DIAGNOSIS — R73.03 PREDIABETES.: ICD-10-CM

## 2022-07-19 DIAGNOSIS — Z85.79 PERSONAL HISTORY OF OTHER MALIGNANT NEOPLASMS OF LYMPHOID, HEMATOPOIETIC AND RELATED TISSUES: ICD-10-CM

## 2022-07-19 DIAGNOSIS — R09.81 NASAL CONGESTION: ICD-10-CM

## 2022-07-19 DIAGNOSIS — Z86.39 PERSONAL HISTORY OF OTHER ENDOCRINE, NUTRITIONAL AND METABOLIC DISEASE: ICD-10-CM

## 2022-07-19 DIAGNOSIS — C83.30 DIFFUSE LARGE B-CELL LYMPHOMA, UNSPECIFIED SITE: ICD-10-CM

## 2022-07-19 DIAGNOSIS — R06.83 SNORING: ICD-10-CM

## 2022-07-19 DIAGNOSIS — R09.82 POSTNASAL DRIP: ICD-10-CM

## 2022-07-19 PROCEDURE — 99204 OFFICE O/P NEW MOD 45 MIN: CPT | Mod: 25

## 2022-07-19 PROCEDURE — 31231 NASAL ENDOSCOPY DX: CPT

## 2022-07-19 RX ORDER — LOSARTAN POTASSIUM AND HYDROCHLOROTHIAZIDE 25; 100 MG/1; MG/1
100-25 TABLET ORAL
Qty: 30 | Refills: 0 | Status: ACTIVE | COMMUNITY
Start: 2022-02-09

## 2022-07-19 RX ORDER — ELECTROLYTES/DEXTROSE
SOLUTION, ORAL ORAL
Refills: 0 | Status: ACTIVE | COMMUNITY

## 2022-07-19 RX ORDER — AMLODIPINE BESYLATE 5 MG/1
5 TABLET ORAL
Qty: 30 | Refills: 0 | Status: ACTIVE | COMMUNITY
Start: 2022-02-09

## 2022-07-19 RX ORDER — LOSARTAN POTASSIUM AND HYDROCHLOROTHIAZIDE 12.5; 5 MG/1; MG/1
50-12.5 TABLET ORAL
Refills: 0 | Status: DISCONTINUED | COMMUNITY
End: 2022-07-19

## 2022-07-19 RX ORDER — OMEPRAZOLE 20 MG/1
20 CAPSULE, DELAYED RELEASE ORAL
Qty: 60 | Refills: 0 | Status: ACTIVE | COMMUNITY
Start: 2022-02-25

## 2022-07-19 RX ORDER — AMLODIPINE BESYLATE 10 MG/1
10 TABLET ORAL
Refills: 0 | Status: DISCONTINUED | COMMUNITY
End: 2022-07-19

## 2022-07-19 RX ORDER — ASCORBIC ACID 500 MG
TABLET ORAL
Refills: 0 | Status: ACTIVE | COMMUNITY

## 2022-07-19 RX ORDER — NABUMETONE 750 MG/1
750 TABLET, FILM COATED ORAL
Qty: 60 | Refills: 0 | Status: ACTIVE | COMMUNITY
Start: 2022-04-27

## 2022-07-19 RX ORDER — SIMVASTATIN 10 MG/1
10 TABLET, FILM COATED ORAL
Qty: 30 | Refills: 0 | Status: ACTIVE | COMMUNITY
Start: 2021-12-08

## 2022-07-19 RX ORDER — ASPIRIN 325 MG/1
TABLET, FILM COATED ORAL
Refills: 0 | Status: ACTIVE | COMMUNITY

## 2022-07-19 RX ORDER — DONEPEZIL HYDROCHLORIDE 5 MG/1
5 TABLET ORAL
Qty: 30 | Refills: 0 | Status: ACTIVE | COMMUNITY
Start: 2022-02-09

## 2022-07-19 NOTE — CONSULT LETTER
[Dear  ___] : Dear  [unfilled], [( Thank you for referring [unfilled] for consultation for _____ )] : Thank you for referring [unfilled] for consultation for [unfilled] [Please see my note below.] : Please see my note below. [Sincerely,] : Sincerely,

## 2022-07-19 NOTE — HISTORY OF PRESENT ILLNESS
[de-identified] : 71 year old male presents for evaluation of VALERIA\par Referred by PCP, Dr. Pittman\par He reportedly told him he needs CPAP because "his blood is very thick"\par Labs reviewed-- H/H 17.7/53.5\par Hx lymphoma, trach/PEG-- patient reports no further treatment\par Previously seen by Dr. Billy in 2019\par Symptoms: Reports intermittent  nasal congestion, with PND, clear sputum expectorated. Also constantly snoring.\par Denies anterior rhinorrhea, dyspnea, facial pain and pressure, epistaxis\par Sense of smell is good\par Recurrent sinus infections: no\par Duration past few weeks\par Current nasal/allergy medications: none\par History of smoking for the past 50 years, currently smoke 1 pack per week

## 2022-07-19 NOTE — PHYSICAL EXAM
[de-identified] : well-healed trach incision [Nasal Endoscopy Performed] : nasal endoscopy was performed, see procedure section for findings [] : septum deviated to the right [Midline] : trachea located in midline position [de-identified] : macroglossia [Normal] : no rashes

## 2022-08-11 ENCOUNTER — APPOINTMENT (OUTPATIENT)
Dept: PULMONOLOGY | Facility: CLINIC | Age: 72
End: 2022-08-11

## 2023-02-18 NOTE — BEHAVIORAL HEALTH ASSESSMENT NOTE - HYGIENE
For information on Fall & Injury Prevention, visit: [email or web address] OR  [email or web address] OR  https://www.cdc.gov/steadi/patient.html
Fair

## 2023-04-25 ENCOUNTER — RX RENEWAL (OUTPATIENT)
Age: 73
End: 2023-04-25

## 2023-04-25 RX ORDER — FLUTICASONE PROPIONATE 50 UG/1
50 SPRAY, METERED NASAL
Qty: 1 | Refills: 2 | Status: ACTIVE | COMMUNITY
Start: 2022-07-19 | End: 1900-01-01

## 2023-05-18 NOTE — CONSULT NOTE ADULT - PROBLEM SELECTOR RECOMMENDATION 9
Discussed with provider and patient will not need any blood work.  Patient is called and informed of response.  He will come in tomorrow morning and get specimen cups and kit for 24 hour urine collection and bring home.  He will return on Monday with specimens.   Patient with JAVIER in setting of IV contrast, ARB use and low BP. On admission, Scr. was noted to be WNL. However since 12/13/18, Scr. was noted to be trending up and is now elevated at 1.44 on 12/20/18. UA shows bland urinary sediment. Patient with hemodynamically mediated JAVIER due to volume depletion? Continue with IV hydration with NS. Check bladder scan to ensure patient is not retaining. Check renal ultrasound. Recommend to hold ARB and HCTZ for now. Monitor BMP daily. Avoid NSAIDs, RCAs, and other nephrotoxins. Patient with JAVIER in setting of IV contrast, ARB use and low BP. On admission, Scr. was noted to be WNL. However since 12/13/18, Scr. was noted to be trending up and is now elevated at 1.44 on 12/20/18. UA shows bland urinary sediment. Patient with hemodynamically mediated JAVIER with component of volume depletion. Continue with IV hydration with NS. Check bladder scan to ensure patient is not retaining. Check renal ultrasound. Recommend to hold ARB and HCTZ for now. Monitor BMP daily. Avoid NSAIDs, RCAs, and other nephrotoxins. Trend TLS labs.

## 2023-10-26 NOTE — ED ADULT NURSE NOTE - PAIN RATING/NUMBER SCALE (0-10): ACTIVITY
Subjective   Patient ID: Rickie Villareal is a 66 y.o. male who presents for Back Pain (Rfa worked on rt side not lt side ).  HPI  Patient is here for a follow up for his lumbar pain. He had a BL L4/5, L5/s1 RFA completed. He states he had 100% improvement on his right low back. His left lower back seems flared up. 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     His neck is also painful. He states he notices pain into his BL arms along with tingling. He thinks the painting did bother his neck as well.   He is wondering about scheduling an injection.       Review of Systems   Constitutional: Negative.    HENT: Negative.     Respiratory:  Negative for cough and shortness of breath.    Cardiovascular:  Negative for chest pain, palpitations and leg swelling.   Gastrointestinal:  Negative for abdominal distention, abdominal pain, diarrhea, nausea and vomiting.   Endocrine: Negative for cold intolerance and heat intolerance.   Genitourinary:  Negative for difficulty urinating, dysuria and urgency. Flank pain: BL upper extremity.  Musculoskeletal: Negative.    Skin: Negative.    Neurological:  Positive for numbness.   Hematological: Negative.    Psychiatric/Behavioral:  Negative for agitation, confusion, sleep disturbance and suicidal ideas.        Objective   Physical Exam  Constitutional:       Appearance: Normal appearance.   HENT:      Head: Normocephalic.   Pulmonary:      Effort: Pulmonary effort is normal.   Musculoskeletal:      Cervical back: Tenderness present. Pain with movement present.      Lumbar back: Spasms and tenderness (left lower lumbar) present. Decreased range of motion.   Skin:     General: Skin is warm and dry.   Neurological:      Mental Status: He is alert.         Assessment/Plan   Problem List Items Addressed This Visit             ICD-10-CM       Musculoskeletal and Injuries    Failed back syndrome M96.1    Myalgia M79.10       Neuro    Cervical spinal stenosis M48.02     Lumbar spondylosis M47.816     Other Visit Diagnoses         Codes    Other chronic pain    -  Primary G89.29    Relevant Medications    cyclobenzaprine (Flexeril) 5 mg tablet    methylPREDNISolone (Medrol Dospak) 4 mg tablets             I nice discussion with the patient today our plan will be as follows.    Radiology: imaging reviewed    Physically:  He completed PT and does a HEP    Psychologically:  no concern     Medication: I will give him a medrol pack for the flare up. I will also start him on flexeril at bedtime.     Duration:  chronic on going     Intervention:  WE will schedule him for his approved cervical injection.   He will follow up 2 weeks after.      0

## 2023-12-13 ENCOUNTER — APPOINTMENT (OUTPATIENT)
Dept: ORTHOPEDIC SURGERY | Facility: CLINIC | Age: 73
End: 2023-12-13
Payer: MEDICARE

## 2023-12-13 DIAGNOSIS — M65.341 TRIGGER FINGER, RIGHT RING FINGER: ICD-10-CM

## 2023-12-13 PROCEDURE — 99204 OFFICE O/P NEW MOD 45 MIN: CPT | Mod: 25

## 2023-12-13 PROCEDURE — 20550 NJX 1 TENDON SHEATH/LIGAMENT: CPT | Mod: F8

## 2024-10-14 ENCOUNTER — APPOINTMENT (OUTPATIENT)
Dept: ORTHOPEDIC SURGERY | Facility: CLINIC | Age: 74
End: 2024-10-14
Payer: MEDICARE

## 2024-10-14 DIAGNOSIS — M65.342 TRIGGER FINGER, LEFT RING FINGER: ICD-10-CM

## 2024-10-14 PROCEDURE — 99203 OFFICE O/P NEW LOW 30 MIN: CPT | Mod: 25

## 2024-10-14 PROCEDURE — 20550 NJX 1 TENDON SHEATH/LIGAMENT: CPT | Mod: F3

## 2024-10-14 RX ORDER — SEMAGLUTIDE 0.68 MG/ML
2 INJECTION, SOLUTION SUBCUTANEOUS
Refills: 0 | Status: ACTIVE | COMMUNITY

## 2024-10-28 ENCOUNTER — APPOINTMENT (OUTPATIENT)
Dept: ORTHOPEDIC SURGERY | Facility: CLINIC | Age: 74
End: 2024-10-28
Payer: MEDICARE

## 2024-10-28 DIAGNOSIS — M65.341 TRIGGER FINGER, RIGHT RING FINGER: ICD-10-CM

## 2024-10-28 PROCEDURE — 99213 OFFICE O/P EST LOW 20 MIN: CPT | Mod: 25

## 2024-10-28 PROCEDURE — 20550 NJX 1 TENDON SHEATH/LIGAMENT: CPT | Mod: F8

## 2024-12-02 ENCOUNTER — APPOINTMENT (OUTPATIENT)
Dept: ORTHOPEDIC SURGERY | Facility: CLINIC | Age: 74
End: 2024-12-02

## 2025-04-28 ENCOUNTER — APPOINTMENT (OUTPATIENT)
Dept: ORTHOPEDIC SURGERY | Facility: CLINIC | Age: 75
End: 2025-04-28
Payer: MEDICARE

## 2025-04-28 DIAGNOSIS — R22.32 LOCALIZED SWELLING, MASS AND LUMP, LEFT UPPER LIMB: ICD-10-CM

## 2025-04-28 DIAGNOSIS — M65.341 TRIGGER FINGER, RIGHT RING FINGER: ICD-10-CM

## 2025-04-28 PROCEDURE — 20550 NJX 1 TENDON SHEATH/LIGAMENT: CPT | Mod: F8

## 2025-04-28 PROCEDURE — 20612 ASPIRATE/INJ GANGLION CYST: CPT | Mod: LT

## 2025-04-28 PROCEDURE — 99213 OFFICE O/P EST LOW 20 MIN: CPT | Mod: 25

## 2025-05-19 NOTE — SWALLOW BEDSIDE ASSESSMENT ADULT - SPECIFY REASON(S)
Addended by: ISABELLA MARTINEZ on: 5/19/2025 09:57 AM     Modules accepted: Orders    
to assess swallow function
to assess the swallow mechanism; r/o dysphagia.

## 2025-07-24 NOTE — PATIENT PROFILE ADULT - BRADEN MOISTURE
Include Z78.9 (Other Specified Conditions Influencing Health Status) As An Associated Diagnosis?: No Consent: The patient's consent was obtained including but not limited to risks of crusting, scabbing, scarring, blistering, darker or lighter pigmentary change, recurrence, incomplete removal and infection. Canthacur Duration Text (Please Remove Duration From Postcare): The patient was instructed to leave the Canthacur on for 6-8 hours and then wash the area well with soap and water. Post-Care Instructions: I reviewed with the patient in detail post-care instructions. The patient understands that the treated areas should be washed off 6 to 8 hours after application. Strength: Janet (4) rarely moist Cantharone Duration Text (Please Remove Duration From Postcare): The patient was instructed to leave the Cantharone on for 1-2 hours and then wash the area well with soap and water. Cantharone Forte Duration Text (Please Remove Duration From Postcare): The patient was instructed to leave the Cantharone Forte on for 6-8 hours and then wash the area well with soap and water. Total Number Of Lesions Treated: 6 Detail Level: Zone Canthacur Ps Duration Text (Please Remove Duration From Postcare): The patient was instructed to leave the Canthacur PS on for 6-8 hours and then wash the area well with soap and water. Cantharone Plus Duration Text (Please Remove Duration From Postcare): The patient was instructed to leave the Cantharone Plus on for 6-8 hours and then wash the area well with soap and water. Medical Necessity Information: It is in your best interest to select a reason for this procedure from the list below. All of these items fulfill various CMS LCD requirements except the new and changing color options. Curette Text: Prior to application of cantharidin the lesions were lightly pared with a curette. Medical Necessity Clause: This procedure was medically necessary because the lesions that were treated were: Yes